# Patient Record
Sex: FEMALE | Race: WHITE | NOT HISPANIC OR LATINO | URBAN - METROPOLITAN AREA
[De-identification: names, ages, dates, MRNs, and addresses within clinical notes are randomized per-mention and may not be internally consistent; named-entity substitution may affect disease eponyms.]

---

## 2017-01-11 ENCOUNTER — OUTPATIENT (OUTPATIENT)
Dept: OUTPATIENT SERVICES | Facility: HOSPITAL | Age: 77
LOS: 1 days | Discharge: HOME | End: 2017-01-11

## 2017-04-21 ENCOUNTER — APPOINTMENT (OUTPATIENT)
Dept: CARDIOLOGY | Facility: CLINIC | Age: 77
End: 2017-04-21

## 2017-04-21 VITALS — WEIGHT: 142 LBS | HEIGHT: 67 IN | BODY MASS INDEX: 22.29 KG/M2

## 2017-04-21 VITALS — HEART RATE: 68 BPM | RESPIRATION RATE: 18 BRPM | DIASTOLIC BLOOD PRESSURE: 70 MMHG | SYSTOLIC BLOOD PRESSURE: 110 MMHG

## 2017-06-01 ENCOUNTER — OUTPATIENT (OUTPATIENT)
Dept: OUTPATIENT SERVICES | Facility: HOSPITAL | Age: 77
LOS: 1 days | Discharge: HOME | End: 2017-06-01

## 2017-06-01 DIAGNOSIS — R06.02 SHORTNESS OF BREATH: ICD-10-CM

## 2017-06-22 ENCOUNTER — OUTPATIENT (OUTPATIENT)
Dept: OUTPATIENT SERVICES | Facility: HOSPITAL | Age: 77
LOS: 1 days | Discharge: HOME | End: 2017-06-22

## 2017-06-22 DIAGNOSIS — R06.02 SHORTNESS OF BREATH: ICD-10-CM

## 2017-06-26 ENCOUNTER — OUTPATIENT (OUTPATIENT)
Dept: OUTPATIENT SERVICES | Facility: HOSPITAL | Age: 77
LOS: 1 days | Discharge: HOME | End: 2017-06-26

## 2017-06-26 DIAGNOSIS — R06.02 SHORTNESS OF BREATH: ICD-10-CM

## 2017-06-28 DIAGNOSIS — Z79.01 LONG TERM (CURRENT) USE OF ANTICOAGULANTS: ICD-10-CM

## 2017-06-28 DIAGNOSIS — I82.91 CHRONIC EMBOLISM AND THROMBOSIS OF UNSPECIFIED VEIN: ICD-10-CM

## 2017-07-13 ENCOUNTER — OUTPATIENT (OUTPATIENT)
Dept: OUTPATIENT SERVICES | Facility: HOSPITAL | Age: 77
LOS: 1 days | Discharge: HOME | End: 2017-07-13

## 2017-07-13 DIAGNOSIS — R06.02 SHORTNESS OF BREATH: ICD-10-CM

## 2017-07-13 DIAGNOSIS — Z95.2 PRESENCE OF PROSTHETIC HEART VALVE: ICD-10-CM

## 2017-07-13 DIAGNOSIS — Z79.01 LONG TERM (CURRENT) USE OF ANTICOAGULANTS: ICD-10-CM

## 2017-08-03 ENCOUNTER — OUTPATIENT (OUTPATIENT)
Dept: OUTPATIENT SERVICES | Facility: HOSPITAL | Age: 77
LOS: 1 days | Discharge: HOME | End: 2017-08-03

## 2017-08-03 DIAGNOSIS — Z95.2 PRESENCE OF PROSTHETIC HEART VALVE: ICD-10-CM

## 2017-08-03 DIAGNOSIS — Z79.01 LONG TERM (CURRENT) USE OF ANTICOAGULANTS: ICD-10-CM

## 2017-08-03 DIAGNOSIS — R06.02 SHORTNESS OF BREATH: ICD-10-CM

## 2017-08-24 ENCOUNTER — OUTPATIENT (OUTPATIENT)
Dept: OUTPATIENT SERVICES | Facility: HOSPITAL | Age: 77
LOS: 1 days | Discharge: HOME | End: 2017-08-24

## 2017-08-24 DIAGNOSIS — R06.02 SHORTNESS OF BREATH: ICD-10-CM

## 2017-08-24 DIAGNOSIS — Z95.2 PRESENCE OF PROSTHETIC HEART VALVE: ICD-10-CM

## 2017-08-24 DIAGNOSIS — Z79.01 LONG TERM (CURRENT) USE OF ANTICOAGULANTS: ICD-10-CM

## 2017-09-07 DIAGNOSIS — Z79.01 LONG TERM (CURRENT) USE OF ANTICOAGULANTS: ICD-10-CM

## 2017-09-07 DIAGNOSIS — I48.91 UNSPECIFIED ATRIAL FIBRILLATION: ICD-10-CM

## 2017-09-22 ENCOUNTER — OUTPATIENT (OUTPATIENT)
Dept: OUTPATIENT SERVICES | Facility: HOSPITAL | Age: 77
LOS: 1 days | Discharge: HOME | End: 2017-09-22

## 2017-09-22 DIAGNOSIS — Z79.01 LONG TERM (CURRENT) USE OF ANTICOAGULANTS: ICD-10-CM

## 2017-09-22 DIAGNOSIS — I48.91 UNSPECIFIED ATRIAL FIBRILLATION: ICD-10-CM

## 2017-09-22 DIAGNOSIS — R06.02 SHORTNESS OF BREATH: ICD-10-CM

## 2017-09-25 ENCOUNTER — OUTPATIENT (OUTPATIENT)
Dept: OUTPATIENT SERVICES | Facility: HOSPITAL | Age: 77
LOS: 1 days | Discharge: HOME | End: 2017-09-25

## 2017-09-25 DIAGNOSIS — R06.02 SHORTNESS OF BREATH: ICD-10-CM

## 2017-09-25 DIAGNOSIS — R07.89 OTHER CHEST PAIN: ICD-10-CM

## 2017-10-16 ENCOUNTER — OUTPATIENT (OUTPATIENT)
Dept: OUTPATIENT SERVICES | Facility: HOSPITAL | Age: 77
LOS: 1 days | Discharge: HOME | End: 2017-10-16

## 2017-10-16 DIAGNOSIS — R06.02 SHORTNESS OF BREATH: ICD-10-CM

## 2017-10-16 DIAGNOSIS — Z79.01 LONG TERM (CURRENT) USE OF ANTICOAGULANTS: ICD-10-CM

## 2017-10-16 DIAGNOSIS — I48.91 UNSPECIFIED ATRIAL FIBRILLATION: ICD-10-CM

## 2017-10-18 ENCOUNTER — RECORD ABSTRACTING (OUTPATIENT)
Age: 77
End: 2017-10-18

## 2017-10-18 DIAGNOSIS — R06.02 SHORTNESS OF BREATH: ICD-10-CM

## 2017-10-18 DIAGNOSIS — Z82.49 FAMILY HISTORY OF ISCHEMIC HEART DISEASE AND OTHER DISEASES OF THE CIRCULATORY SYSTEM: ICD-10-CM

## 2017-10-20 ENCOUNTER — APPOINTMENT (OUTPATIENT)
Dept: CARDIOLOGY | Facility: CLINIC | Age: 77
End: 2017-10-20

## 2017-10-20 VITALS — RESPIRATION RATE: 18 BRPM | SYSTOLIC BLOOD PRESSURE: 120 MMHG | HEART RATE: 68 BPM | DIASTOLIC BLOOD PRESSURE: 80 MMHG

## 2017-10-20 VITALS — BODY MASS INDEX: 21.82 KG/M2 | HEIGHT: 67 IN | WEIGHT: 139 LBS

## 2017-10-20 DIAGNOSIS — Z86.79 PERSONAL HISTORY OF OTHER DISEASES OF THE CIRCULATORY SYSTEM: ICD-10-CM

## 2017-11-06 ENCOUNTER — OUTPATIENT (OUTPATIENT)
Dept: OUTPATIENT SERVICES | Facility: HOSPITAL | Age: 77
LOS: 1 days | Discharge: HOME | End: 2017-11-06

## 2017-11-06 DIAGNOSIS — R06.02 SHORTNESS OF BREATH: ICD-10-CM

## 2017-11-06 DIAGNOSIS — I48.91 UNSPECIFIED ATRIAL FIBRILLATION: ICD-10-CM

## 2017-11-06 DIAGNOSIS — Z79.01 LONG TERM (CURRENT) USE OF ANTICOAGULANTS: ICD-10-CM

## 2017-12-04 ENCOUNTER — APPOINTMENT (OUTPATIENT)
Dept: CARDIOLOGY | Facility: CLINIC | Age: 77
End: 2017-12-04

## 2017-12-06 ENCOUNTER — OUTPATIENT (OUTPATIENT)
Dept: OUTPATIENT SERVICES | Facility: HOSPITAL | Age: 77
LOS: 1 days | Discharge: HOME | End: 2017-12-06

## 2017-12-06 DIAGNOSIS — R06.02 SHORTNESS OF BREATH: ICD-10-CM

## 2017-12-06 DIAGNOSIS — Z79.01 LONG TERM (CURRENT) USE OF ANTICOAGULANTS: ICD-10-CM

## 2017-12-06 DIAGNOSIS — I48.91 UNSPECIFIED ATRIAL FIBRILLATION: ICD-10-CM

## 2018-01-08 ENCOUNTER — OUTPATIENT (OUTPATIENT)
Dept: OUTPATIENT SERVICES | Facility: HOSPITAL | Age: 78
LOS: 1 days | Discharge: HOME | End: 2018-01-08

## 2018-01-08 DIAGNOSIS — R06.02 SHORTNESS OF BREATH: ICD-10-CM

## 2018-01-08 DIAGNOSIS — I48.91 UNSPECIFIED ATRIAL FIBRILLATION: ICD-10-CM

## 2018-01-08 DIAGNOSIS — Z79.01 LONG TERM (CURRENT) USE OF ANTICOAGULANTS: ICD-10-CM

## 2018-02-05 ENCOUNTER — OUTPATIENT (OUTPATIENT)
Dept: OUTPATIENT SERVICES | Facility: HOSPITAL | Age: 78
LOS: 1 days | Discharge: HOME | End: 2018-02-05

## 2018-02-05 DIAGNOSIS — Z79.01 LONG TERM (CURRENT) USE OF ANTICOAGULANTS: ICD-10-CM

## 2018-02-05 DIAGNOSIS — I48.91 UNSPECIFIED ATRIAL FIBRILLATION: ICD-10-CM

## 2018-03-05 ENCOUNTER — OUTPATIENT (OUTPATIENT)
Dept: OUTPATIENT SERVICES | Facility: HOSPITAL | Age: 78
LOS: 1 days | Discharge: HOME | End: 2018-03-05

## 2018-03-05 DIAGNOSIS — Z79.01 LONG TERM (CURRENT) USE OF ANTICOAGULANTS: ICD-10-CM

## 2018-03-05 DIAGNOSIS — I48.91 UNSPECIFIED ATRIAL FIBRILLATION: ICD-10-CM

## 2018-04-02 ENCOUNTER — OUTPATIENT (OUTPATIENT)
Dept: OUTPATIENT SERVICES | Facility: HOSPITAL | Age: 78
LOS: 1 days | Discharge: HOME | End: 2018-04-02

## 2018-04-02 DIAGNOSIS — Z79.01 LONG TERM (CURRENT) USE OF ANTICOAGULANTS: ICD-10-CM

## 2018-04-02 DIAGNOSIS — I48.91 UNSPECIFIED ATRIAL FIBRILLATION: ICD-10-CM

## 2018-04-23 ENCOUNTER — APPOINTMENT (OUTPATIENT)
Dept: CARDIOLOGY | Facility: CLINIC | Age: 78
End: 2018-04-23

## 2018-04-23 VITALS — HEART RATE: 98 BPM | DIASTOLIC BLOOD PRESSURE: 80 MMHG | RESPIRATION RATE: 18 BRPM | SYSTOLIC BLOOD PRESSURE: 136 MMHG

## 2018-04-23 VITALS — WEIGHT: 130 LBS | HEIGHT: 67 IN | BODY MASS INDEX: 20.4 KG/M2

## 2018-04-23 DIAGNOSIS — I48.2 CHRONIC ATRIAL FIBRILLATION: ICD-10-CM

## 2018-04-23 DIAGNOSIS — Z86.39 PERSONAL HISTORY OF OTHER ENDOCRINE, NUTRITIONAL AND METABOLIC DISEASE: ICD-10-CM

## 2018-04-30 ENCOUNTER — OUTPATIENT (OUTPATIENT)
Dept: OUTPATIENT SERVICES | Facility: HOSPITAL | Age: 78
LOS: 1 days | Discharge: HOME | End: 2018-04-30

## 2018-04-30 DIAGNOSIS — Z79.01 LONG TERM (CURRENT) USE OF ANTICOAGULANTS: ICD-10-CM

## 2018-04-30 DIAGNOSIS — I48.91 UNSPECIFIED ATRIAL FIBRILLATION: ICD-10-CM

## 2018-05-31 ENCOUNTER — OUTPATIENT (OUTPATIENT)
Dept: OUTPATIENT SERVICES | Facility: HOSPITAL | Age: 78
LOS: 1 days | Discharge: HOME | End: 2018-05-31

## 2018-05-31 DIAGNOSIS — I48.91 UNSPECIFIED ATRIAL FIBRILLATION: ICD-10-CM

## 2018-05-31 DIAGNOSIS — Z79.01 LONG TERM (CURRENT) USE OF ANTICOAGULANTS: ICD-10-CM

## 2018-06-13 ENCOUNTER — OUTPATIENT (OUTPATIENT)
Dept: OUTPATIENT SERVICES | Facility: HOSPITAL | Age: 78
LOS: 1 days | Discharge: HOME | End: 2018-06-13

## 2018-06-13 DIAGNOSIS — I48.91 UNSPECIFIED ATRIAL FIBRILLATION: ICD-10-CM

## 2018-06-13 DIAGNOSIS — Z79.01 LONG TERM (CURRENT) USE OF ANTICOAGULANTS: ICD-10-CM

## 2018-07-12 ENCOUNTER — OUTPATIENT (OUTPATIENT)
Dept: OUTPATIENT SERVICES | Facility: HOSPITAL | Age: 78
LOS: 1 days | Discharge: HOME | End: 2018-07-12

## 2018-07-12 DIAGNOSIS — Z79.01 LONG TERM (CURRENT) USE OF ANTICOAGULANTS: ICD-10-CM

## 2018-07-12 DIAGNOSIS — I48.91 UNSPECIFIED ATRIAL FIBRILLATION: ICD-10-CM

## 2018-08-09 ENCOUNTER — OUTPATIENT (OUTPATIENT)
Dept: OUTPATIENT SERVICES | Facility: HOSPITAL | Age: 78
LOS: 1 days | Discharge: HOME | End: 2018-08-09

## 2018-08-09 DIAGNOSIS — Z79.01 LONG TERM (CURRENT) USE OF ANTICOAGULANTS: ICD-10-CM

## 2018-08-09 DIAGNOSIS — I48.91 UNSPECIFIED ATRIAL FIBRILLATION: ICD-10-CM

## 2018-08-21 ENCOUNTER — OUTPATIENT (OUTPATIENT)
Dept: OUTPATIENT SERVICES | Facility: HOSPITAL | Age: 78
LOS: 1 days | Discharge: HOME | End: 2018-08-21

## 2018-08-21 LAB
POCT INR: 2.1 RATIO — HIGH (ref 0.9–1.2)
POCT PT: 25.7 SEC — HIGH (ref 10–13.4)

## 2018-09-06 ENCOUNTER — OUTPATIENT (OUTPATIENT)
Dept: OUTPATIENT SERVICES | Facility: HOSPITAL | Age: 78
LOS: 1 days | Discharge: HOME | End: 2018-09-06

## 2018-09-06 DIAGNOSIS — Z79.01 LONG TERM (CURRENT) USE OF ANTICOAGULANTS: ICD-10-CM

## 2018-09-06 DIAGNOSIS — I48.91 UNSPECIFIED ATRIAL FIBRILLATION: ICD-10-CM

## 2018-09-06 LAB
POCT INR: 2.1 RATIO — HIGH (ref 0.9–1.2)
POCT PT: 25.7 SEC — HIGH (ref 10–13.4)

## 2018-09-12 ENCOUNTER — APPOINTMENT (OUTPATIENT)
Dept: CARDIOLOGY | Facility: CLINIC | Age: 78
End: 2018-09-12

## 2018-09-27 ENCOUNTER — OUTPATIENT (OUTPATIENT)
Dept: OUTPATIENT SERVICES | Facility: HOSPITAL | Age: 78
LOS: 1 days | Discharge: HOME | End: 2018-09-27

## 2018-09-27 DIAGNOSIS — Z79.01 LONG TERM (CURRENT) USE OF ANTICOAGULANTS: ICD-10-CM

## 2018-09-27 DIAGNOSIS — I48.91 UNSPECIFIED ATRIAL FIBRILLATION: ICD-10-CM

## 2018-09-27 LAB
POCT INR: 2.9 RATIO — HIGH (ref 0.9–1.2)
POCT PT: 35.1 SEC — HIGH (ref 10–13.4)

## 2018-10-17 ENCOUNTER — OUTPATIENT (OUTPATIENT)
Dept: OUTPATIENT SERVICES | Facility: HOSPITAL | Age: 78
LOS: 1 days | Discharge: HOME | End: 2018-10-17

## 2018-10-17 DIAGNOSIS — R91.1 SOLITARY PULMONARY NODULE: ICD-10-CM

## 2018-10-29 ENCOUNTER — OUTPATIENT (OUTPATIENT)
Dept: OUTPATIENT SERVICES | Facility: HOSPITAL | Age: 78
LOS: 1 days | Discharge: HOME | End: 2018-10-29

## 2018-10-29 DIAGNOSIS — Z79.01 LONG TERM (CURRENT) USE OF ANTICOAGULANTS: ICD-10-CM

## 2018-10-29 DIAGNOSIS — I48.91 UNSPECIFIED ATRIAL FIBRILLATION: ICD-10-CM

## 2018-10-29 LAB
POCT INR: 2.6 RATIO — HIGH (ref 0.9–1.2)
POCT PT: 30.7 SEC — HIGH (ref 10–13.4)

## 2018-11-26 ENCOUNTER — OUTPATIENT (OUTPATIENT)
Dept: OUTPATIENT SERVICES | Facility: HOSPITAL | Age: 78
LOS: 1 days | Discharge: HOME | End: 2018-11-26

## 2018-11-26 DIAGNOSIS — I48.91 UNSPECIFIED ATRIAL FIBRILLATION: ICD-10-CM

## 2018-11-26 DIAGNOSIS — Z79.01 LONG TERM (CURRENT) USE OF ANTICOAGULANTS: ICD-10-CM

## 2018-11-26 LAB
POCT INR: 3.4 RATIO — HIGH (ref 0.9–1.2)
POCT PT: 40.2 SEC — HIGH (ref 10–13.4)

## 2018-12-03 ENCOUNTER — OUTPATIENT (OUTPATIENT)
Dept: OUTPATIENT SERVICES | Facility: HOSPITAL | Age: 78
LOS: 1 days | Discharge: HOME | End: 2018-12-03

## 2018-12-03 DIAGNOSIS — Z79.01 LONG TERM (CURRENT) USE OF ANTICOAGULANTS: ICD-10-CM

## 2018-12-03 DIAGNOSIS — I48.91 UNSPECIFIED ATRIAL FIBRILLATION: ICD-10-CM

## 2018-12-03 LAB
POCT INR: 2.3 RATIO — HIGH (ref 0.9–1.2)
POCT PT: 27.4 SEC — HIGH (ref 10–13.4)

## 2018-12-13 ENCOUNTER — OUTPATIENT (OUTPATIENT)
Dept: OUTPATIENT SERVICES | Facility: HOSPITAL | Age: 78
LOS: 1 days | Discharge: HOME | End: 2018-12-13

## 2018-12-13 DIAGNOSIS — Z79.01 LONG TERM (CURRENT) USE OF ANTICOAGULANTS: ICD-10-CM

## 2018-12-13 DIAGNOSIS — I48.91 UNSPECIFIED ATRIAL FIBRILLATION: ICD-10-CM

## 2018-12-13 LAB
POCT INR: 2.5 RATIO — HIGH (ref 0.9–1.2)
POCT PT: 30.5 SEC — HIGH (ref 10–13.4)

## 2019-01-01 ENCOUNTER — OUTPATIENT (OUTPATIENT)
Dept: OUTPATIENT SERVICES | Facility: HOSPITAL | Age: 79
LOS: 1 days | Discharge: HOME | End: 2019-01-01

## 2019-01-01 DIAGNOSIS — N28.1 CYST OF KIDNEY, ACQUIRED: Chronic | ICD-10-CM

## 2019-01-01 DIAGNOSIS — Z90.49 ACQUIRED ABSENCE OF OTHER SPECIFIED PARTS OF DIGESTIVE TRACT: Chronic | ICD-10-CM

## 2019-01-01 DIAGNOSIS — Z79.01 LONG TERM (CURRENT) USE OF ANTICOAGULANTS: ICD-10-CM

## 2019-01-01 DIAGNOSIS — I48.91 UNSPECIFIED ATRIAL FIBRILLATION: ICD-10-CM

## 2019-01-01 LAB
INR PPP: 2.1 RATIO
POCT INR: 2.1 RATIO — HIGH (ref 0.9–1.2)
POCT INR: 2.1 RATIO — HIGH (ref 0.9–1.2)
POCT PT: 25 SEC — HIGH (ref 10–13.4)
POCT PT: 25 SEC — HIGH (ref 10–13.4)
POCT-PROTHROMBIN TIME: 25 SECS

## 2019-01-03 ENCOUNTER — OUTPATIENT (OUTPATIENT)
Dept: OUTPATIENT SERVICES | Facility: HOSPITAL | Age: 79
LOS: 1 days | Discharge: HOME | End: 2019-01-03

## 2019-01-03 DIAGNOSIS — Z79.01 LONG TERM (CURRENT) USE OF ANTICOAGULANTS: ICD-10-CM

## 2019-01-03 DIAGNOSIS — I48.91 UNSPECIFIED ATRIAL FIBRILLATION: ICD-10-CM

## 2019-01-03 LAB
POCT INR: 2.9 RATIO — HIGH (ref 0.9–1.2)
POCT PT: 35.2 SEC — HIGH (ref 10–13.4)

## 2019-02-05 ENCOUNTER — OUTPATIENT (OUTPATIENT)
Dept: OUTPATIENT SERVICES | Facility: HOSPITAL | Age: 79
LOS: 1 days | Discharge: HOME | End: 2019-02-05

## 2019-02-05 DIAGNOSIS — Z79.01 LONG TERM (CURRENT) USE OF ANTICOAGULANTS: ICD-10-CM

## 2019-02-05 DIAGNOSIS — I48.91 UNSPECIFIED ATRIAL FIBRILLATION: ICD-10-CM

## 2019-02-05 LAB
POCT INR: 2 RATIO — HIGH (ref 0.9–1.2)
POCT PT: 24.3 SEC — HIGH (ref 10–13.4)

## 2019-03-01 ENCOUNTER — APPOINTMENT (OUTPATIENT)
Dept: CARDIOLOGY | Facility: CLINIC | Age: 79
End: 2019-03-01

## 2019-03-01 VITALS — HEART RATE: 68 BPM | RESPIRATION RATE: 18 BRPM | SYSTOLIC BLOOD PRESSURE: 120 MMHG | DIASTOLIC BLOOD PRESSURE: 70 MMHG

## 2019-03-01 VITALS — WEIGHT: 121 LBS | BODY MASS INDEX: 18.99 KG/M2 | HEIGHT: 67 IN

## 2019-03-01 DIAGNOSIS — Z87.09 PERSONAL HISTORY OF OTHER DISEASES OF THE RESPIRATORY SYSTEM: ICD-10-CM

## 2019-03-01 NOTE — PHYSICAL EXAM
[General Appearance - Well Developed] : well developed [Normal Appearance] : normal appearance [Well Groomed] : well groomed [General Appearance - Well Nourished] : well nourished [No Deformities] : no deformities [General Appearance - In No Acute Distress] : no acute distress [Normal Conjunctiva] : the conjunctiva exhibited no abnormalities [Eyelids - No Xanthelasma] : the eyelids demonstrated no xanthelasmas [Normal Oral Mucosa] : normal oral mucosa [No Oral Pallor] : no oral pallor [No Oral Cyanosis] : no oral cyanosis [Normal Jugular Venous A Waves Present] : normal jugular venous A waves present [Normal Jugular Venous V Waves Present] : normal jugular venous V waves present [No Jugular Venous Monreal A Waves] : no jugular venous monreal A waves [Respiration, Rhythm And Depth] : normal respiratory rhythm and effort [Exaggerated Use Of Accessory Muscles For Inspiration] : no accessory muscle use [Auscultation Breath Sounds / Voice Sounds] : lungs were clear to auscultation bilaterally [Heart Rate And Rhythm] : heart rate and rhythm were normal [Irregularly Irregular] : the rhythm was irregularly irregular [Systolic grade ___/6] : A grade [unfilled]/6 systolic murmur was heard. [Bowel Sounds] : normal bowel sounds [Abdomen Soft] : soft [Abdomen Tenderness] : non-tender [Abdomen Mass (___ Cm)] : no abdominal mass palpated [Abnormal Walk] : normal gait [Nail Clubbing] : no clubbing of the fingernails [Cyanosis, Localized] : no localized cyanosis [Petechial Hemorrhages (___cm)] : no petechial hemorrhages [Skin Color & Pigmentation] : normal skin color and pigmentation [] : no rash [No Venous Stasis] : no venous stasis [Skin Lesions] : no skin lesions [No Skin Ulcers] : no skin ulcer [No Xanthoma] : no  xanthoma was observed [Oriented To Time, Place, And Person] : oriented to person, place, and time

## 2019-03-04 ENCOUNTER — OUTPATIENT (OUTPATIENT)
Dept: OUTPATIENT SERVICES | Facility: HOSPITAL | Age: 79
LOS: 1 days | Discharge: HOME | End: 2019-03-04

## 2019-03-04 DIAGNOSIS — I48.91 UNSPECIFIED ATRIAL FIBRILLATION: ICD-10-CM

## 2019-03-04 DIAGNOSIS — Z79.01 LONG TERM (CURRENT) USE OF ANTICOAGULANTS: ICD-10-CM

## 2019-03-04 LAB
POCT INR: 2.6 RATIO — HIGH (ref 0.9–1.2)
POCT PT: 31 SEC — HIGH (ref 10–13.4)

## 2019-04-02 ENCOUNTER — OUTPATIENT (OUTPATIENT)
Dept: OUTPATIENT SERVICES | Facility: HOSPITAL | Age: 79
LOS: 1 days | Discharge: HOME | End: 2019-04-02

## 2019-04-02 DIAGNOSIS — I48.91 UNSPECIFIED ATRIAL FIBRILLATION: ICD-10-CM

## 2019-04-02 DIAGNOSIS — Z79.01 LONG TERM (CURRENT) USE OF ANTICOAGULANTS: ICD-10-CM

## 2019-04-02 LAB
POCT INR: 2 RATIO — HIGH (ref 0.9–1.2)
POCT PT: 23.9 SEC — HIGH (ref 10–13.4)

## 2019-04-30 ENCOUNTER — OUTPATIENT (OUTPATIENT)
Dept: OUTPATIENT SERVICES | Facility: HOSPITAL | Age: 79
LOS: 1 days | Discharge: HOME | End: 2019-04-30

## 2019-04-30 DIAGNOSIS — I48.91 UNSPECIFIED ATRIAL FIBRILLATION: ICD-10-CM

## 2019-04-30 DIAGNOSIS — Z79.01 LONG TERM (CURRENT) USE OF ANTICOAGULANTS: ICD-10-CM

## 2019-04-30 LAB
POCT INR: 1.6 RATIO — HIGH (ref 0.9–1.2)
POCT PT: 19.2 SEC — HIGH (ref 10–13.4)

## 2019-05-10 ENCOUNTER — OUTPATIENT (OUTPATIENT)
Dept: OUTPATIENT SERVICES | Facility: HOSPITAL | Age: 79
LOS: 1 days | Discharge: HOME | End: 2019-05-10

## 2019-05-10 DIAGNOSIS — I48.91 UNSPECIFIED ATRIAL FIBRILLATION: ICD-10-CM

## 2019-05-10 DIAGNOSIS — Z79.01 LONG TERM (CURRENT) USE OF ANTICOAGULANTS: ICD-10-CM

## 2019-05-10 LAB
POCT INR: 2 RATIO — HIGH (ref 0.9–1.2)
POCT PT: 24.3 SEC — HIGH (ref 10–13.4)

## 2019-05-30 ENCOUNTER — OUTPATIENT (OUTPATIENT)
Dept: OUTPATIENT SERVICES | Facility: HOSPITAL | Age: 79
LOS: 1 days | Discharge: HOME | End: 2019-05-30

## 2019-05-30 DIAGNOSIS — Z79.01 LONG TERM (CURRENT) USE OF ANTICOAGULANTS: ICD-10-CM

## 2019-05-30 DIAGNOSIS — I48.91 UNSPECIFIED ATRIAL FIBRILLATION: ICD-10-CM

## 2019-05-30 LAB
POCT INR: 1.9 RATIO — HIGH (ref 0.9–1.2)
POCT PT: 22.4 SEC — HIGH (ref 10–13.4)

## 2019-06-17 ENCOUNTER — OUTPATIENT (OUTPATIENT)
Dept: OUTPATIENT SERVICES | Facility: HOSPITAL | Age: 79
LOS: 1 days | Discharge: HOME | End: 2019-06-17

## 2019-06-17 DIAGNOSIS — Z79.01 LONG TERM (CURRENT) USE OF ANTICOAGULANTS: ICD-10-CM

## 2019-06-17 DIAGNOSIS — I48.91 UNSPECIFIED ATRIAL FIBRILLATION: ICD-10-CM

## 2019-06-17 LAB
POCT INR: 1.9 RATIO — HIGH (ref 0.9–1.2)
POCT PT: 22.5 SEC — HIGH (ref 10–13.4)

## 2019-07-01 ENCOUNTER — OUTPATIENT (OUTPATIENT)
Dept: OUTPATIENT SERVICES | Facility: HOSPITAL | Age: 79
LOS: 1 days | Discharge: HOME | End: 2019-07-01

## 2019-07-01 DIAGNOSIS — Z79.01 LONG TERM (CURRENT) USE OF ANTICOAGULANTS: ICD-10-CM

## 2019-07-01 DIAGNOSIS — I48.91 UNSPECIFIED ATRIAL FIBRILLATION: ICD-10-CM

## 2019-07-01 LAB
POCT INR: 2.3 RATIO — HIGH (ref 0.9–1.2)
POCT PT: 27.6 SEC — HIGH (ref 10–13.4)

## 2019-08-05 ENCOUNTER — OUTPATIENT (OUTPATIENT)
Dept: OUTPATIENT SERVICES | Facility: HOSPITAL | Age: 79
LOS: 1 days | Discharge: HOME | End: 2019-08-05

## 2019-08-05 DIAGNOSIS — I48.91 UNSPECIFIED ATRIAL FIBRILLATION: ICD-10-CM

## 2019-08-05 DIAGNOSIS — Z79.01 LONG TERM (CURRENT) USE OF ANTICOAGULANTS: ICD-10-CM

## 2019-08-05 LAB
POCT INR: 2.3 RATIO — HIGH (ref 0.9–1.2)
POCT PT: 27.4 SEC — HIGH (ref 10–13.4)

## 2019-09-06 ENCOUNTER — INPATIENT (INPATIENT)
Facility: HOSPITAL | Age: 79
LOS: 6 days | Discharge: HOME HEALTH | End: 2019-09-13
Attending: INTERNAL MEDICINE | Admitting: INTERNAL MEDICINE
Payer: MEDICARE

## 2019-09-06 VITALS
HEART RATE: 120 BPM | OXYGEN SATURATION: 89 % | SYSTOLIC BLOOD PRESSURE: 203 MMHG | TEMPERATURE: 97 F | DIASTOLIC BLOOD PRESSURE: 87 MMHG | RESPIRATION RATE: 20 BRPM

## 2019-09-06 DIAGNOSIS — J44.0 CHRONIC OBSTRUCTIVE PULMONARY DISEASE WITH (ACUTE) LOWER RESPIRATORY INFECTION: ICD-10-CM

## 2019-09-06 DIAGNOSIS — E78.5 HYPERLIPIDEMIA, UNSPECIFIED: ICD-10-CM

## 2019-09-06 DIAGNOSIS — I48.2 CHRONIC ATRIAL FIBRILLATION: ICD-10-CM

## 2019-09-06 DIAGNOSIS — Z79.01 LONG TERM (CURRENT) USE OF ANTICOAGULANTS: ICD-10-CM

## 2019-09-06 DIAGNOSIS — J44.1 CHRONIC OBSTRUCTIVE PULMONARY DISEASE WITH (ACUTE) EXACERBATION: ICD-10-CM

## 2019-09-06 DIAGNOSIS — G47.00 INSOMNIA, UNSPECIFIED: ICD-10-CM

## 2019-09-06 DIAGNOSIS — I50.30 UNSPECIFIED DIASTOLIC (CONGESTIVE) HEART FAILURE: ICD-10-CM

## 2019-09-06 DIAGNOSIS — E87.5 HYPERKALEMIA: ICD-10-CM

## 2019-09-06 DIAGNOSIS — E87.2 ACIDOSIS: ICD-10-CM

## 2019-09-06 DIAGNOSIS — E87.70 FLUID OVERLOAD, UNSPECIFIED: ICD-10-CM

## 2019-09-06 DIAGNOSIS — R09.02 HYPOXEMIA: ICD-10-CM

## 2019-09-06 DIAGNOSIS — J96.10 CHRONIC RESPIRATORY FAILURE, UNSPECIFIED WHETHER WITH HYPOXIA OR HYPERCAPNIA: ICD-10-CM

## 2019-09-06 DIAGNOSIS — I34.0 NONRHEUMATIC MITRAL (VALVE) INSUFFICIENCY: ICD-10-CM

## 2019-09-06 DIAGNOSIS — I10 ESSENTIAL (PRIMARY) HYPERTENSION: ICD-10-CM

## 2019-09-06 DIAGNOSIS — I11.0 HYPERTENSIVE HEART DISEASE WITH HEART FAILURE: ICD-10-CM

## 2019-09-06 DIAGNOSIS — J15.6 PNEUMONIA DUE TO OTHER GRAM-NEGATIVE BACTERIA: ICD-10-CM

## 2019-09-06 LAB
ALBUMIN SERPL ELPH-MCNC: 3.8 G/DL — SIGNIFICANT CHANGE UP (ref 3.5–5.2)
ALP SERPL-CCNC: 96 U/L — SIGNIFICANT CHANGE UP (ref 30–115)
ALT FLD-CCNC: 27 U/L — SIGNIFICANT CHANGE UP (ref 0–41)
ANION GAP SERPL CALC-SCNC: 10 MMOL/L — SIGNIFICANT CHANGE UP (ref 7–14)
APTT BLD: 37.9 SEC — SIGNIFICANT CHANGE UP (ref 27–39.2)
AST SERPL-CCNC: 22 U/L — SIGNIFICANT CHANGE UP (ref 0–41)
BASE EXCESS BLDV CALC-SCNC: 7.4 MMOL/L — HIGH (ref -2–2)
BASOPHILS # BLD AUTO: 0.03 K/UL — SIGNIFICANT CHANGE UP (ref 0–0.2)
BASOPHILS NFR BLD AUTO: 0.5 % — SIGNIFICANT CHANGE UP (ref 0–1)
BILIRUB SERPL-MCNC: 0.5 MG/DL — SIGNIFICANT CHANGE UP (ref 0.2–1.2)
BUN SERPL-MCNC: 12 MG/DL — SIGNIFICANT CHANGE UP (ref 10–20)
CA-I SERPL-SCNC: 1.13 MMOL/L — SIGNIFICANT CHANGE UP (ref 1.12–1.3)
CALCIUM SERPL-MCNC: 9 MG/DL — SIGNIFICANT CHANGE UP (ref 8.5–10.1)
CHLORIDE SERPL-SCNC: 93 MMOL/L — LOW (ref 98–110)
CO2 SERPL-SCNC: 31 MMOL/L — SIGNIFICANT CHANGE UP (ref 17–32)
CREAT SERPL-MCNC: 0.6 MG/DL — LOW (ref 0.7–1.5)
DIGOXIN SERPL-MCNC: 1 NG/ML — SIGNIFICANT CHANGE UP (ref 0.8–2)
EOSINOPHIL # BLD AUTO: 0.03 K/UL — SIGNIFICANT CHANGE UP (ref 0–0.7)
EOSINOPHIL NFR BLD AUTO: 0.5 % — SIGNIFICANT CHANGE UP (ref 0–8)
GAS PNL BLDV: 135 MMOL/L — LOW (ref 136–145)
GAS PNL BLDV: SIGNIFICANT CHANGE UP
GLUCOSE SERPL-MCNC: 92 MG/DL — SIGNIFICANT CHANGE UP (ref 70–99)
HCO3 BLDV-SCNC: 35 MMOL/L — HIGH (ref 22–29)
HCT VFR BLD CALC: 37.6 % — SIGNIFICANT CHANGE UP (ref 37–47)
HCT VFR BLDA CALC: 39.2 % — SIGNIFICANT CHANGE UP (ref 34–44)
HGB BLD CALC-MCNC: 12.8 G/DL — LOW (ref 14–18)
HGB BLD-MCNC: 12 G/DL — SIGNIFICANT CHANGE UP (ref 12–16)
IMM GRANULOCYTES NFR BLD AUTO: 0.5 % — HIGH (ref 0.1–0.3)
INR BLD: 2.21 RATIO — HIGH (ref 0.65–1.3)
LACTATE BLDV-MCNC: 0.9 MMOL/L — SIGNIFICANT CHANGE UP (ref 0.5–1.6)
LACTATE SERPL-SCNC: 0.8 MMOL/L — SIGNIFICANT CHANGE UP (ref 0.5–2.2)
LYMPHOCYTES # BLD AUTO: 0.84 K/UL — LOW (ref 1.2–3.4)
LYMPHOCYTES # BLD AUTO: 12.7 % — LOW (ref 20.5–51.1)
MCHC RBC-ENTMCNC: 27.1 PG — SIGNIFICANT CHANGE UP (ref 27–31)
MCHC RBC-ENTMCNC: 31.9 G/DL — LOW (ref 32–37)
MCV RBC AUTO: 84.9 FL — SIGNIFICANT CHANGE UP (ref 81–99)
MONOCYTES # BLD AUTO: 0.51 K/UL — SIGNIFICANT CHANGE UP (ref 0.1–0.6)
MONOCYTES NFR BLD AUTO: 7.7 % — SIGNIFICANT CHANGE UP (ref 1.7–9.3)
NEUTROPHILS # BLD AUTO: 5.17 K/UL — SIGNIFICANT CHANGE UP (ref 1.4–6.5)
NEUTROPHILS NFR BLD AUTO: 78.1 % — HIGH (ref 42.2–75.2)
NRBC # BLD: 0 /100 WBCS — SIGNIFICANT CHANGE UP (ref 0–0)
NT-PROBNP SERPL-SCNC: 3331 PG/ML — HIGH (ref 0–300)
PCO2 BLDV: 62 MMHG — HIGH (ref 41–51)
PH BLDV: 7.36 — SIGNIFICANT CHANGE UP (ref 7.26–7.43)
PLATELET # BLD AUTO: 281 K/UL — SIGNIFICANT CHANGE UP (ref 130–400)
PO2 BLDV: 29 MMHG — SIGNIFICANT CHANGE UP (ref 20–40)
POTASSIUM BLDV-SCNC: 4.3 MMOL/L — SIGNIFICANT CHANGE UP (ref 3.3–5.6)
POTASSIUM SERPL-MCNC: 4.7 MMOL/L — SIGNIFICANT CHANGE UP (ref 3.5–5)
POTASSIUM SERPL-SCNC: 4.7 MMOL/L — SIGNIFICANT CHANGE UP (ref 3.5–5)
PROT SERPL-MCNC: 6.5 G/DL — SIGNIFICANT CHANGE UP (ref 6–8)
PROTHROM AB SERPL-ACNC: 25.2 SEC — HIGH (ref 9.95–12.87)
RBC # BLD: 4.43 M/UL — SIGNIFICANT CHANGE UP (ref 4.2–5.4)
RBC # FLD: 14.4 % — SIGNIFICANT CHANGE UP (ref 11.5–14.5)
SAO2 % BLDV: 53 % — SIGNIFICANT CHANGE UP
SODIUM SERPL-SCNC: 134 MMOL/L — LOW (ref 135–146)
TROPONIN T SERPL-MCNC: <0.01 NG/ML — SIGNIFICANT CHANGE UP
WBC # BLD: 6.61 K/UL — SIGNIFICANT CHANGE UP (ref 4.8–10.8)
WBC # FLD AUTO: 6.61 K/UL — SIGNIFICANT CHANGE UP (ref 4.8–10.8)

## 2019-09-06 PROCEDURE — 99285 EMERGENCY DEPT VISIT HI MDM: CPT | Mod: GC

## 2019-09-06 PROCEDURE — 93010 ELECTROCARDIOGRAM REPORT: CPT

## 2019-09-06 PROCEDURE — 71045 X-RAY EXAM CHEST 1 VIEW: CPT | Mod: 26,59

## 2019-09-06 PROCEDURE — 71046 X-RAY EXAM CHEST 2 VIEWS: CPT | Mod: 26

## 2019-09-06 RX ORDER — DIGOXIN 250 MCG
0.25 TABLET ORAL DAILY
Refills: 0 | Status: DISCONTINUED | OUTPATIENT
Start: 2019-09-06 | End: 2019-09-13

## 2019-09-06 RX ORDER — ZALEPLON 10 MG
5 CAPSULE ORAL AT BEDTIME
Refills: 0 | Status: DISCONTINUED | OUTPATIENT
Start: 2019-09-06 | End: 2019-09-07

## 2019-09-06 RX ORDER — ALBUTEROL 90 UG/1
2.5 AEROSOL, METERED ORAL ONCE
Refills: 0 | Status: COMPLETED | OUTPATIENT
Start: 2019-09-06 | End: 2019-09-06

## 2019-09-06 RX ORDER — CEFTRIAXONE 500 MG/1
1000 INJECTION, POWDER, FOR SOLUTION INTRAMUSCULAR; INTRAVENOUS ONCE
Refills: 0 | Status: COMPLETED | OUTPATIENT
Start: 2019-09-06 | End: 2019-09-06

## 2019-09-06 RX ORDER — DILTIAZEM HCL 120 MG
360 CAPSULE, EXT RELEASE 24 HR ORAL DAILY
Refills: 0 | Status: DISCONTINUED | OUTPATIENT
Start: 2019-09-06 | End: 2019-09-13

## 2019-09-06 RX ORDER — AZITHROMYCIN 500 MG/1
500 TABLET, FILM COATED ORAL EVERY 24 HOURS
Refills: 0 | Status: DISCONTINUED | OUTPATIENT
Start: 2019-09-06 | End: 2019-09-11

## 2019-09-06 RX ORDER — IPRATROPIUM/ALBUTEROL SULFATE 18-103MCG
3 AEROSOL WITH ADAPTER (GRAM) INHALATION EVERY 6 HOURS
Refills: 0 | Status: DISCONTINUED | OUTPATIENT
Start: 2019-09-06 | End: 2019-09-09

## 2019-09-06 RX ORDER — WARFARIN SODIUM 2.5 MG/1
5 TABLET ORAL AT BEDTIME
Refills: 0 | Status: COMPLETED | OUTPATIENT
Start: 2019-09-06 | End: 2019-09-07

## 2019-09-06 RX ORDER — IPRATROPIUM/ALBUTEROL SULFATE 18-103MCG
3 AEROSOL WITH ADAPTER (GRAM) INHALATION ONCE
Refills: 0 | Status: COMPLETED | OUTPATIENT
Start: 2019-09-06 | End: 2019-09-06

## 2019-09-06 RX ORDER — AZITHROMYCIN 500 MG/1
500 TABLET, FILM COATED ORAL EVERY 24 HOURS
Refills: 0 | Status: DISCONTINUED | OUTPATIENT
Start: 2019-09-06 | End: 2019-09-06

## 2019-09-06 RX ORDER — CEFTRIAXONE 500 MG/1
1000 INJECTION, POWDER, FOR SOLUTION INTRAMUSCULAR; INTRAVENOUS EVERY 24 HOURS
Refills: 0 | Status: COMPLETED | OUTPATIENT
Start: 2019-09-06 | End: 2019-09-10

## 2019-09-06 RX ORDER — ASPIRIN/CALCIUM CARB/MAGNESIUM 324 MG
81 TABLET ORAL DAILY
Refills: 0 | Status: DISCONTINUED | OUTPATIENT
Start: 2019-09-06 | End: 2019-09-13

## 2019-09-06 RX ORDER — DILTIAZEM HCL 120 MG
300 CAPSULE, EXT RELEASE 24 HR ORAL DAILY
Refills: 0 | Status: DISCONTINUED | OUTPATIENT
Start: 2019-09-06 | End: 2019-09-06

## 2019-09-06 RX ORDER — ATORVASTATIN CALCIUM 80 MG/1
10 TABLET, FILM COATED ORAL AT BEDTIME
Refills: 0 | Status: DISCONTINUED | OUTPATIENT
Start: 2019-09-06 | End: 2019-09-13

## 2019-09-06 RX ORDER — CHLORHEXIDINE GLUCONATE 213 G/1000ML
1 SOLUTION TOPICAL
Refills: 0 | Status: DISCONTINUED | OUTPATIENT
Start: 2019-09-06 | End: 2019-09-13

## 2019-09-06 RX ADMIN — CEFTRIAXONE 1000 MILLIGRAM(S): 500 INJECTION, POWDER, FOR SOLUTION INTRAMUSCULAR; INTRAVENOUS at 18:17

## 2019-09-06 RX ADMIN — Medication 3 MILLILITER(S): at 19:20

## 2019-09-06 RX ADMIN — ALBUTEROL 2.5 MILLIGRAM(S): 90 AEROSOL, METERED ORAL at 20:02

## 2019-09-06 RX ADMIN — Medication 3 MILLILITER(S): at 19:00

## 2019-09-06 RX ADMIN — ALBUTEROL 2.5 MILLIGRAM(S): 90 AEROSOL, METERED ORAL at 19:40

## 2019-09-06 RX ADMIN — Medication 125 MILLIGRAM(S): at 17:38

## 2019-09-06 RX ADMIN — AZITHROMYCIN 255 MILLIGRAM(S): 500 TABLET, FILM COATED ORAL at 18:17

## 2019-09-06 RX ADMIN — Medication 300 MILLIGRAM(S): at 17:37

## 2019-09-06 RX ADMIN — Medication 3 MILLILITER(S): at 17:38

## 2019-09-06 RX ADMIN — ALBUTEROL 2.5 MILLIGRAM(S): 90 AEROSOL, METERED ORAL at 19:51

## 2019-09-06 RX ADMIN — CEFTRIAXONE 100 MILLIGRAM(S): 500 INJECTION, POWDER, FOR SOLUTION INTRAMUSCULAR; INTRAVENOUS at 17:42

## 2019-09-06 NOTE — H&P ADULT - NSHPPHYSICALEXAM_GEN_ALL_CORE
PHYSICAL EXAM:  GENERAL: NAD, lying in bed comfortably  HEAD:  Atraumatic, Normocephalic  EYES: EOMI, PERRLA, conjunctiva and sclera clear  ENT: Moist mucous membranes  NECK: Supple, No JVD  CHEST/LUNG: Clear to auscultation bilaterally; No rales, rhonchi, wheezing, or rubs. Unlabored respirations  HEART: Regular rate and rhythm; No murmurs, rubs, or gallops  ABDOMEN: Bowel sounds present; Soft, Nontender, Nondistended. No hepatomegally  EXTREMITIES:  2+ Peripheral Pulses, brisk capillary refill. No clubbing, cyanosis, or edema  NERVOUS SYSTEM:  Alert & Oriented X3, speech clear. No deficits   MSK: FROM all 4 extremities, full and equal strength  SKIN: No rashes or lesions PHYSICAL EXAM:  GENERAL: NAD, lying in bed comfortably  HEAD:  Atraumatic, Normocephalic  EYES: EOMI, PERRLA, conjunctiva and sclera clear  ENT: Moist mucous membranes  CHEST/LUNG: mild wheeze bilateral   HEART: normal s1, s2  ABDOMEN: Soft, Nontender, Nondistended. No hepatomegally  EXTREMITIES:   brisk capillary refill. No clubbing, cyanosis, or edema  NERVOUS SYSTEM:  Alert & Oriented X3, speech clear. No deficits   MSK: FROM all 4 extremities, full and equal strength  SKIN: No rashes or lesions

## 2019-09-06 NOTE — H&P ADULT - ASSESSMENT
79 year old female with a history of afib on coumadin, htn, hld, copd (not on home 02) presents here for worsening sob for the last several days. 79 year old female with a history of afib on coumadin, htn, hld, copd (not on home 02) presents here for worsening sob for the last several days.    # COPD exacerbation due to RLL CAP?  - no fever or leukocytosis, hemodynamically stable   - PA/ Lateral CXR   - IV solumedrol 60 BID   - nebs q6   - RVP   - ceftriaxone and azithromycin     # afib rate controlled   - c/w cardizim and coumadin     # HTN  c/w enalapril    # DASH diet   # FULL code   Dispo as per PT 79 year old female with a history of afib on coumadin, htn, hld, copd (not on home 02) presents here for worsening sob for the last several days.    # COPD exacerbation due to RLL CAP?  - no fever or leukocytosis, hemodynamically stable   - PA/ Lateral CXR   - IV solumedrol 60 BID   - nebs q6   - RVP   - ceftriaxone and azithromycin     # afib rate controlled   - c/w Cardizem 360?, digoxin and coumadin 5 QD    # HTN  c/w enalapril    # DVT ppx on AC  # DASH diet   # FULL code   Dispo as per PT

## 2019-09-06 NOTE — ED PROVIDER NOTE - NS ED ROS FT
Constitutional: See HPI.  Eyes: No visual changes  ENMT: No neck pain   Cardiac: No cp  Respiratory: see hpi  GI: No nausea, vomiting, diarrhea or abdominal pain.  : No dysuria, frequency or burning.  MS: No myalgia, muscle weakness, joint pain or back pain.  Psych: No suicidal or homicidal ideations.  Neuro: No headache   Skin: No skin rash.

## 2019-09-06 NOTE — ED PROVIDER NOTE - ATTENDING CONTRIBUTION TO CARE
79 y F with a pmh of afib on digoxin, coumadin, and cardizem, htn, hld, COPD no home O2, pw productive cough of yellow phlegm, and worsening SOB x 3-4 days. Denies fever, chills, nausea, vomiting, diarrhea, dysuria, hematuria, numbness, tingling, weakness, pain to anywhere, leg swelling, malignancy history, recent travel or surgery. Previously in normal state of health. Skipped her cardizem today due to feeling unwell.  Exam: NAD, NCAT, HEENT: mmm, EOMI, PERRLA, Neck: supple, nontender, nl ROM, Heart: irr irr tachy 90s-130s, no murmur, Lungs: b/l diffuse wheezing and right midrange to base rhonchi. tachypnea, prolonged exp phase, pursed lip breathing and foreshortened sentences, Abd: NTND, no guarding or rebound, no hernia palpated, no CVAT. MSK: chest, back, and ext nontender, nl rom, no deformity. Neuro: A&Ox3, normal strength, nl sensation throughout, normal speech.  A/P: Eval for PNA or PTX or other complication causing primarily COPD exacerbation. Assess for volume status, generally appears euvolemic to slightly dehydrated. Rate control with home medication when breathing is improved, assess dig level. Labs, imaging, fluids, chained nebs, feels claustrophobia and declines BiPAP. Reassess.

## 2019-09-06 NOTE — H&P ADULT - HISTORY OF PRESENT ILLNESS
79 year old female with a history of afib on coumadin, htn, hld, copd (not on home 02) presents here for worsening sob for the last several days.  patient was doing well until 4 Patient endorses a cough productive a clear sputum. States feels similar to her previous copd exacerbation. Patient denies any fever chills cp n/v abdominal pain urinary frequency urgency or burning. 79 year old female with a history of afib on coumadin, HTN, HLD, COPD (not on home 02) presents here for worsening sob for 5 days duration.    patient was doing well until 4 days prior to presentation when she started productive cough and worsening SOB, with no fever or chills, no sick contact,   Patient denies cp n/v abdominal pain, urinary or symptoms   in ed:  T(C): 36.8 (06 Sep 2019 20:00), Max: 36.8 (06 Sep 2019 20:00)  T(F): 98.2 (06 Sep 2019 20:00), Max: 98.2 (06 Sep 2019 20:00)  HR: 110 (06 Sep 2019 20:00) (101 - 120)  BP: 128/81 (06 Sep 2019 20:00) (128/81 - 203/87)  RR: 20 (06 Sep 2019 20:00) (20 - 22)  SpO2: 95% (06 Sep 2019 20:00) (89% - 95%)  CXR showed RLL opacity, with no leukocytosis, received Rocephin and azithromycin

## 2019-09-06 NOTE — ED PROVIDER NOTE - OBJECTIVE STATEMENT
79 year old female with a pmh of afib (takes cardizem) on coumadin, htn hld copd (not on home 02) presents here for worsening sob for the last several days. Patient endorses a cough productive a clear sputum. States feels similar to her previous copd exacerbation. Patient denies any fever chills cp n/v abdominal pain urinary frequency urgency or burning.    cardiologist: jose   pulmonologist: kirstie

## 2019-09-06 NOTE — H&P ADULT - NSHPLABSRESULTS_GEN_ALL_CORE
12.0   6.61  )-----------( 281      ( 06 Sep 2019 18:00 )             37.6       09-06    134<L>  |  93<L>  |  12  ----------------------------<  92  4.7   |  31  |  0.6<L>    Ca    9.0      06 Sep 2019 18:00    TPro  6.5  /  Alb  3.8  /  TBili  0.5  /  DBili  x   /  AST  22  /  ALT  27  /  AlkPhos  96  09-06                  PT/INR - ( 06 Sep 2019 18:00 )   PT: 25.20 sec;   INR: 2.21 ratio         PTT - ( 06 Sep 2019 18:00 )  PTT:37.9 sec    Lactate Trend  09-06 @ 18:00 Lactate:0.8       CARDIAC MARKERS ( 06 Sep 2019 18:00 )  x     / <0.01 ng/mL / x     / x     / x            CAPILLARY BLOOD GLUCOSE

## 2019-09-06 NOTE — H&P ADULT - ATTENDING COMMENTS
79 year old female with a history of afib on coumadin, HTN, HLD, COPD (not on home 02) presents here for worsening sob for 5 days duration.    patient was doing well until 4 days prior to presentation when she started productive cough and worsening SOB, with no fever or chills, no sick contact,   Patient denies cp n/v abdominal pain, urinary or symptoms   in ed:  T(C): 36.8 (06 Sep 2019 20:00), Max: 36.8 (06 Sep 2019 20:00)  T(F): 98.2 (06 Sep 2019 20:00), Max: 98.2 (06 Sep 2019 20:00)  HR: 110 (06 Sep 2019 20:00) (101 - 120)  BP: 128/81 (06 Sep 2019 20:00) (128/81 - 203/87)  RR: 20 (06 Sep 2019 20:00) (20 - 22)  SpO2: 95% (06 Sep 2019 20:00) (89% - 95%)  CXR showed RLL opacity, with no leukocytosis, received Rocephin and azithromycin     REVIEW OF SYSTEMS: see cc/HPI  CONSTITUTIONAL: No weakness, fevers or chills  EYES/ENT: No visual changes;  No vertigo or throat pain   NECK: No pain or stiffness  RESPIRATORY: No cough, wheezing, hemoptysis; No shortness of breath  CARDIOVASCULAR: No chest pain or palpitations  GASTROINTESTINAL: No abdominal or epigastric pain. No nausea, vomiting, or hematemesis; No diarrhea or constipation. No melena or hematochezia.  GENITOURINARY: No dysuria, frequency or hematuria  NEUROLOGICAL: No numbness or weakness  SKIN: No itching, rashes    Physical Exam:   General: WN/WD NAD  Neurology: A&Ox3, nonfocal, follows commands  Eyes: PERRLA/ EOMI  ENT/Neck: Neck supple, trachea midline, No JVD  Respiratory: CTA B/L, No wheezing, rales, rhonchi  CV: Normal rate regular rhythm, S1S2, no murmurs, rubs or gallops  Abdominal: Soft, NT, ND +BS,   Extremities: No edema, + peripheral pulses  Skin: No Rashes, Hematoma, Ecchymosis  Incisions:   Tubes: 79 year old female with a history of afib on coumadin, HTN, HLD, COPD (not on home 02) presents here for worsening sob for 5 days duration.    patient was doing well until 4 days prior to presentation when she started productive cough and worsening SOB, with no fever or chills, no sick contact,   Patient denies cp n/v abdominal pain, urinary or symptoms   in ed:  T(C): 36.8 (06 Sep 2019 20:00), Max: 36.8 (06 Sep 2019 20:00)  T(F): 98.2 (06 Sep 2019 20:00), Max: 98.2 (06 Sep 2019 20:00)  HR: 110 (06 Sep 2019 20:00) (101 - 120)  BP: 128/81 (06 Sep 2019 20:00) (128/81 - 203/87)  RR: 20 (06 Sep 2019 20:00) (20 - 22)  SpO2: 95% (06 Sep 2019 20:00) (89% - 95%)  CXR showed RLL opacity, with no leukocytosis, received Rocephin and azithromycin     REVIEW OF SYSTEMS: see cc/HPI  CONSTITUTIONAL: No weakness, fevers or chills  EYES/ENT: No visual changes;  No vertigo or throat pain   NECK: No pain or stiffness  RESPIRATORY: No cough, wheezing, hemoptysis; No shortness of breath  CARDIOVASCULAR: No chest pain or palpitations  GASTROINTESTINAL: No abdominal or epigastric pain. No nausea, vomiting, or hematemesis; No diarrhea or constipation. No melena or hematochezia.  GENITOURINARY: No dysuria, frequency or hematuria  NEUROLOGICAL: No numbness or weakness  SKIN: No itching, rashes    Physical Exam:   General: WN/WD NAD  Neurology: A&Ox3, nonfocal, follows commands  Eyes: PERRLA/ EOMI  ENT/Neck: Neck supple, trachea midline, No JVD  Respiratory: CTA B/L, No wheezing, rales, rhonchi  CV: Normal rate regular rhythm, S1S2, no murmurs, rubs or gallops  Abdominal: Soft, NT, ND +BS,   Extremities: No edema, + peripheral pulses  Skin: No Rashes, Hematoma, Ecchymosis  Incisions:   Tubes:    A/P  COPD exacerbation / CAP   -follow up official read of CXR A/P and lat  -agree w/ RVP   -IV steroids, Nebs , O2 PRN w/ pulse ox monitoring   -IV abx   -repeat WBC and check blood Cx  A. Fib   -c/w current Rx  -check Dig level    HTN- elevated earlier   -c/w above Rx 79 year old female with a history of afib on coumadin, HTN, HLD, COPD (not on home 02) presents here for worsening sob for 5 days duration.    patient was doing well until 4 days prior to presentation when she started productive cough and worsening SOB, with no fever or chills, no sick contact,   Patient denies cp n/v abdominal pain, urinary or symptoms   in ed:  T(C): 36.8 (06 Sep 2019 20:00), Max: 36.8 (06 Sep 2019 20:00)  T(F): 98.2 (06 Sep 2019 20:00), Max: 98.2 (06 Sep 2019 20:00)  HR: 110 (06 Sep 2019 20:00) (101 - 120)  BP: 128/81 (06 Sep 2019 20:00) (128/81 - 203/87)  RR: 20 (06 Sep 2019 20:00) (20 - 22)  SpO2: 95% (06 Sep 2019 20:00) (89% - 95%)  CXR showed RLL opacity, with no leukocytosis, received Rocephin and azithromycin     REVIEW OF SYSTEMS: see cc/HPI  CONSTITUTIONAL: No weakness, fevers or chills  EYES/ENT: No visual changes;  No vertigo or throat pain   NECK: No pain or stiffness  RESPIRATORY: (+) productive cough and wheezing, hemoptysis; (+) shortness of breath  CARDIOVASCULAR: No chest pain or palpitations  GASTROINTESTINAL: No abdominal or epigastric pain. No nausea, vomiting, or hematemesis; No diarrhea or constipation. No melena or hematochezia.  GENITOURINARY: No dysuria, frequency or hematuria  NEUROLOGICAL: No numbness or weakness  SKIN: No itching, rashes    Physical Exam:   General: WN/WD NAD, Using N/C O2  Neurology: A&Ox3, nonfocal, follows commands  Eyes: PERRLA/ EOMI  ENT/Neck: Neck supple, trachea midline, No JVD  Respiratory: B/L scattered wheezing and decreased breath sounds, No rales, (+) coarse rhonchi  CV: Normal rate regular rhythm, S1S2, no murmurs, rubs or gallops  Abdominal: Soft, NT, ND +BS,   Extremities: No edema, + peripheral pulses  Skin: No Rashes, Hematoma, Ecchymosis  Incisions:   Tubes:    A/P  COPD exacerbation / CAP   -follow up official read of CXR A/P and lat  -agree w/ RVP   -IV steroids, Nebs , O2 PRN w/ pulse ox monitoring   -IV abx   -repeat WBC and check blood Cx  A. Fib   -c/w current Rx  -check Dig level    HTN- elevated earlier   -c/w above Rx

## 2019-09-06 NOTE — ED PROVIDER NOTE - CLINICAL SUMMARY MEDICAL DECISION MAKING FREE TEXT BOX
pw SOB, cough, in COPD exacerbation. imaging shows new opacity and air bronchogram on right lower lobe. Abx started. resp status relatively improved but insufficient for home management of symptoms. Rate controlled with home medications. Patient to be admitted to an inpatient floor. Case discussed with and care endorsed to medical admitting resident. Admitting physician notified.

## 2019-09-06 NOTE — ED PROVIDER NOTE - PHYSICAL EXAMINATION
CONSTITUTIONAL: WA / WN / NAD  HEAD: NCAT  EYES: PERRL; EOMI; anicteric.  ENT: Normal pharynx; mucous membranes pink/moist, no erythema.  NECK: Supple; no meningeal signs  CARD: IRR; nl S1/S2; no M/R/G  RESP: Respiratory rate and effort are normal; + crackle/ wheezing left lower lobe  ABD: Soft, NT ND nl bowel sounds; no masses; no rebound  MSK/EXT: No gross deformities; full range of motion.  SKIN: Warm and dry;   NEURO: AAOx3,   PSYCH: Memory Intact, Normal Affect

## 2019-09-06 NOTE — ED PROVIDER NOTE - PROGRESS NOTE DETAILS
Lungs: b/l wheezing throughout, louder with somewhat improved air movement, increased rhonchi to right base and midrange laterally. +pursed lip breathing, no retractions or tripodding, no focal areas of decreased breath sounds, no stridor, managing secretions. Overall improved from prior.

## 2019-09-07 LAB
ANION GAP SERPL CALC-SCNC: 13 MMOL/L — SIGNIFICANT CHANGE UP (ref 7–14)
APTT BLD: 26.8 SEC — LOW (ref 27–39.2)
BASOPHILS # BLD AUTO: 0 K/UL — SIGNIFICANT CHANGE UP (ref 0–0.2)
BASOPHILS NFR BLD AUTO: 0 % — SIGNIFICANT CHANGE UP (ref 0–1)
BUN SERPL-MCNC: 15 MG/DL — SIGNIFICANT CHANGE UP (ref 10–20)
CALCIUM SERPL-MCNC: 9 MG/DL — SIGNIFICANT CHANGE UP (ref 8.5–10.1)
CHLORIDE SERPL-SCNC: 96 MMOL/L — LOW (ref 98–110)
CO2 SERPL-SCNC: 29 MMOL/L — SIGNIFICANT CHANGE UP (ref 17–32)
CREAT SERPL-MCNC: 0.7 MG/DL — SIGNIFICANT CHANGE UP (ref 0.7–1.5)
EOSINOPHIL # BLD AUTO: 0 K/UL — SIGNIFICANT CHANGE UP (ref 0–0.7)
EOSINOPHIL NFR BLD AUTO: 0 % — SIGNIFICANT CHANGE UP (ref 0–8)
GLUCOSE SERPL-MCNC: 142 MG/DL — HIGH (ref 70–99)
HCT VFR BLD CALC: 35.2 % — LOW (ref 37–47)
HGB BLD-MCNC: 11.1 G/DL — LOW (ref 12–16)
IMM GRANULOCYTES NFR BLD AUTO: 0.3 % — SIGNIFICANT CHANGE UP (ref 0.1–0.3)
INR BLD: 2.24 RATIO — HIGH (ref 0.65–1.3)
LYMPHOCYTES # BLD AUTO: 0.23 K/UL — LOW (ref 1.2–3.4)
LYMPHOCYTES # BLD AUTO: 6.5 % — LOW (ref 20.5–51.1)
MCHC RBC-ENTMCNC: 26.7 PG — LOW (ref 27–31)
MCHC RBC-ENTMCNC: 31.5 G/DL — LOW (ref 32–37)
MCV RBC AUTO: 84.6 FL — SIGNIFICANT CHANGE UP (ref 81–99)
MONOCYTES # BLD AUTO: 0.05 K/UL — LOW (ref 0.1–0.6)
MONOCYTES NFR BLD AUTO: 1.4 % — LOW (ref 1.7–9.3)
NEUTROPHILS # BLD AUTO: 3.27 K/UL — SIGNIFICANT CHANGE UP (ref 1.4–6.5)
NEUTROPHILS NFR BLD AUTO: 91.8 % — HIGH (ref 42.2–75.2)
NRBC # BLD: 0 /100 WBCS — SIGNIFICANT CHANGE UP (ref 0–0)
PLATELET # BLD AUTO: 279 K/UL — SIGNIFICANT CHANGE UP (ref 130–400)
POTASSIUM SERPL-MCNC: 4.1 MMOL/L — SIGNIFICANT CHANGE UP (ref 3.5–5)
POTASSIUM SERPL-SCNC: 4.1 MMOL/L — SIGNIFICANT CHANGE UP (ref 3.5–5)
PROTHROM AB SERPL-ACNC: 25.6 SEC — HIGH (ref 9.95–12.87)
RBC # BLD: 4.16 M/UL — LOW (ref 4.2–5.4)
RBC # FLD: 14.6 % — HIGH (ref 11.5–14.5)
SODIUM SERPL-SCNC: 138 MMOL/L — SIGNIFICANT CHANGE UP (ref 135–146)
TROPONIN T SERPL-MCNC: <0.01 NG/ML — SIGNIFICANT CHANGE UP
WBC # BLD: 3.56 K/UL — LOW (ref 4.8–10.8)
WBC # FLD AUTO: 3.56 K/UL — LOW (ref 4.8–10.8)

## 2019-09-07 PROCEDURE — 93010 ELECTROCARDIOGRAM REPORT: CPT

## 2019-09-07 PROCEDURE — 99223 1ST HOSP IP/OBS HIGH 75: CPT | Mod: AI

## 2019-09-07 RX ORDER — ZOLPIDEM TARTRATE 10 MG/1
5 TABLET ORAL ONCE
Refills: 0 | Status: DISCONTINUED | OUTPATIENT
Start: 2019-09-07 | End: 2019-09-07

## 2019-09-07 RX ORDER — LANOLIN ALCOHOL/MO/W.PET/CERES
5 CREAM (GRAM) TOPICAL AT BEDTIME
Refills: 0 | Status: DISCONTINUED | OUTPATIENT
Start: 2019-09-07 | End: 2019-09-13

## 2019-09-07 RX ORDER — WARFARIN SODIUM 2.5 MG/1
5 TABLET ORAL AT BEDTIME
Refills: 0 | Status: COMPLETED | OUTPATIENT
Start: 2019-09-07 | End: 2019-09-07

## 2019-09-07 RX ORDER — PANTOPRAZOLE SODIUM 20 MG/1
40 TABLET, DELAYED RELEASE ORAL
Refills: 0 | Status: DISCONTINUED | OUTPATIENT
Start: 2019-09-07 | End: 2019-09-13

## 2019-09-07 RX ADMIN — ATORVASTATIN CALCIUM 10 MILLIGRAM(S): 80 TABLET, FILM COATED ORAL at 00:17

## 2019-09-07 RX ADMIN — WARFARIN SODIUM 5 MILLIGRAM(S): 2.5 TABLET ORAL at 21:46

## 2019-09-07 RX ADMIN — WARFARIN SODIUM 5 MILLIGRAM(S): 2.5 TABLET ORAL at 00:17

## 2019-09-07 RX ADMIN — Medication 81 MILLIGRAM(S): at 11:35

## 2019-09-07 RX ADMIN — Medication 60 MILLIGRAM(S): at 11:37

## 2019-09-07 RX ADMIN — Medication 60 MILLIGRAM(S): at 17:40

## 2019-09-07 RX ADMIN — ATORVASTATIN CALCIUM 10 MILLIGRAM(S): 80 TABLET, FILM COATED ORAL at 21:46

## 2019-09-07 RX ADMIN — PANTOPRAZOLE SODIUM 40 MILLIGRAM(S): 20 TABLET, DELAYED RELEASE ORAL at 11:36

## 2019-09-07 RX ADMIN — AZITHROMYCIN 255 MILLIGRAM(S): 500 TABLET, FILM COATED ORAL at 17:40

## 2019-09-07 RX ADMIN — CEFTRIAXONE 100 MILLIGRAM(S): 500 INJECTION, POWDER, FOR SOLUTION INTRAMUSCULAR; INTRAVENOUS at 23:53

## 2019-09-07 RX ADMIN — Medication 5 MILLIGRAM(S): at 21:46

## 2019-09-07 RX ADMIN — CEFTRIAXONE 100 MILLIGRAM(S): 500 INJECTION, POWDER, FOR SOLUTION INTRAMUSCULAR; INTRAVENOUS at 00:16

## 2019-09-07 RX ADMIN — ZOLPIDEM TARTRATE 5 MILLIGRAM(S): 10 TABLET ORAL at 00:15

## 2019-09-07 RX ADMIN — Medication 360 MILLIGRAM(S): at 06:18

## 2019-09-07 RX ADMIN — Medication 3 MILLILITER(S): at 08:02

## 2019-09-07 RX ADMIN — Medication 60 MILLIGRAM(S): at 00:18

## 2019-09-07 RX ADMIN — Medication 5 MILLIGRAM(S): at 06:18

## 2019-09-07 RX ADMIN — Medication 3 MILLILITER(S): at 13:21

## 2019-09-07 RX ADMIN — Medication 0.25 MILLIGRAM(S): at 06:18

## 2019-09-07 RX ADMIN — Medication 3 MILLILITER(S): at 20:16

## 2019-09-07 NOTE — PATIENT PROFILE ADULT - ABILITY TO HEAR (WITH HEARING AID OR HEARING APPLIANCE IF NORMALLY USED):
Mildly to Moderately Impaired: difficulty hearing in some environments or speaker may need to increase volume or speak distinctly/Pt states that she has B/L hearing aids at home.

## 2019-09-07 NOTE — PATIENT PROFILE ADULT - IS THERE A SUSPICION OF ABUSE/NEGLIGENCE?
Review of Systems    Constitutional: (-) fever/ chills   Eyes/ENT: (-) blurry vision, (-) epistaxis   Cardiovascular: (-) chest pain, (-) syncope   Respiratory: (-) cough, (-) shortness of breath  Gastrointestinal: (-) vomiting, (-) diarrhea (-) abdominal pain  Musculoskeletal: (-) neck pain, (-) back pain,  Integumentary: (-) rash, (-) swelling  Neurological: (-) headache, (-) altered mental status  Psychiatric: (-) hallucinations or depression +anxiety and unable to focus  Allergic/Immunologic: (-) pruritus no

## 2019-09-07 NOTE — PATIENT PROFILE ADULT - NSTRANSFERBELONGINGSDISPO_GEN_A_NUR
[facility]  [facility]    Fingertip Application Method for Estrogen Vaginal Cream    1. Wash you hands with soap and water and dry thoroughly.     2.  Squeeze out enough cream from the tube to cover 1/2 of your index fin with patient This technique, combined with pelvic floor exercises and possibly medication, can give you complete dryness. This does not work over night, you must be patient with yourself.   This condition occurs over a period of time and will improve gradually with pe pelvic floor muscles and timed voiding protocol or your symptoms may return,  Please discuss this with your health care provider so that (s)he may monitor your progress.

## 2019-09-08 LAB
INR BLD: 2.84 RATIO — HIGH (ref 0.65–1.3)
PROTHROM AB SERPL-ACNC: 32.3 SEC — HIGH (ref 9.95–12.87)

## 2019-09-08 PROCEDURE — 99233 SBSQ HOSP IP/OBS HIGH 50: CPT

## 2019-09-08 RX ORDER — ZOLPIDEM TARTRATE 10 MG/1
5 TABLET ORAL AT BEDTIME
Refills: 0 | Status: DISCONTINUED | OUTPATIENT
Start: 2019-09-08 | End: 2019-09-13

## 2019-09-08 RX ORDER — WARFARIN SODIUM 2.5 MG/1
4 TABLET ORAL ONCE
Refills: 0 | Status: COMPLETED | OUTPATIENT
Start: 2019-09-08 | End: 2019-09-08

## 2019-09-08 RX ORDER — FUROSEMIDE 40 MG
40 TABLET ORAL DAILY
Refills: 0 | Status: DISCONTINUED | OUTPATIENT
Start: 2019-09-08 | End: 2019-09-12

## 2019-09-08 RX ADMIN — Medication 100 MILLIGRAM(S): at 11:20

## 2019-09-08 RX ADMIN — Medication 3 MILLILITER(S): at 13:22

## 2019-09-08 RX ADMIN — Medication 3 MILLILITER(S): at 07:48

## 2019-09-08 RX ADMIN — Medication 360 MILLIGRAM(S): at 05:48

## 2019-09-08 RX ADMIN — Medication 60 MILLIGRAM(S): at 17:43

## 2019-09-08 RX ADMIN — ATORVASTATIN CALCIUM 10 MILLIGRAM(S): 80 TABLET, FILM COATED ORAL at 21:36

## 2019-09-08 RX ADMIN — AZITHROMYCIN 255 MILLIGRAM(S): 500 TABLET, FILM COATED ORAL at 17:43

## 2019-09-08 RX ADMIN — Medication 3 MILLILITER(S): at 20:02

## 2019-09-08 RX ADMIN — Medication 5 MILLIGRAM(S): at 05:48

## 2019-09-08 RX ADMIN — PANTOPRAZOLE SODIUM 40 MILLIGRAM(S): 20 TABLET, DELAYED RELEASE ORAL at 05:48

## 2019-09-08 RX ADMIN — Medication 40 MILLIGRAM(S): at 13:20

## 2019-09-08 RX ADMIN — CEFTRIAXONE 100 MILLIGRAM(S): 500 INJECTION, POWDER, FOR SOLUTION INTRAMUSCULAR; INTRAVENOUS at 23:34

## 2019-09-08 RX ADMIN — Medication 60 MILLIGRAM(S): at 05:48

## 2019-09-08 RX ADMIN — ZOLPIDEM TARTRATE 5 MILLIGRAM(S): 10 TABLET ORAL at 23:35

## 2019-09-08 RX ADMIN — Medication 0.25 MILLIGRAM(S): at 05:48

## 2019-09-08 RX ADMIN — Medication 100 MILLIGRAM(S): at 23:35

## 2019-09-08 RX ADMIN — Medication 81 MILLIGRAM(S): at 11:20

## 2019-09-08 RX ADMIN — CHLORHEXIDINE GLUCONATE 1 APPLICATION(S): 213 SOLUTION TOPICAL at 05:48

## 2019-09-08 RX ADMIN — WARFARIN SODIUM 4 MILLIGRAM(S): 2.5 TABLET ORAL at 21:36

## 2019-09-08 NOTE — PROGRESS NOTE ADULT - ASSESSMENT
79 year old female with a history of afib on coumadin, htn, hld, copd (not on home 02) presents here for worsening sob for the last several days.    # COPD exacerbation possibly due to RLL PNA vs fluid overload   - IV solumedrol 60 BID for another 24 hours still has decreased air movement   - nebs q6   - c/w ceftriaxone and azithromycin   - f/u s/s r/o aspiration   - ambulation o2 sat  -will start on Lasix as BNP is elevated, check echo  -keep I<O    #Abnormal EKG. No CP  -check echo  -outpt f/u with Dr. Hairston    # afib rate controlled   - c/w Cardizem 360 digoxin and coumadin 5 QD  - dig level 1 noted     # Insomnia  - patient states that only ambient wrks but PMD refuses to prescribe in light of COPD and CO2 retension which is reasonable  -outpt sleep specialist referral       # HTN  c/w enalapril    # DVT ppx on AC  # DASH diet   # FULL code   Dispo as per PT 79 year old female with a history of afib on coumadin, htn, hld, copd (not on home 02) presents here for worsening sob for the last several days.    # COPD exacerbation possibly due to RLL PNA vs fluid overload   - IV solumedrol 60 BID for another 24 hours still has decreased air movement   - nebs q6   - c/w ceftriaxone and azithromycin   - f/u s/s r/o aspiration   - ambulation o2 sat  -will start on Lasix as BNP is elevated, check echo  -keep I<O  -repeat CXR in am    #Abnormal EKG. No CP  -check echo  -outpt f/u with Dr. Hairston    # afib rate controlled   - c/w Cardizem 360 digoxin and coumadin 5 QD  - dig level 1 noted     # Insomnia  - patient states that only ambient wrks but PMD refuses to prescribe in light of COPD and CO2 retension which is reasonable  -outpt sleep specialist referral       # HTN  c/w enalapril    # DVT ppx on AC  # DASH diet   # FULL code   Dispo as per PT     #Progress Note Handoff  Pending (specify):  Consults_________, Tests_Echo_______,Medical stability____x_____, PT________  Pt/Family discussion: Pt informed and agrees with the current plan  Disposition: Home__x____/SNF_______/4A________/To be determined________/Waiting for Auth_____

## 2019-09-08 NOTE — PROGRESS NOTE ADULT - ASSESSMENT
79 year old female with a history of afib on coumadin, htn, hld, copd (not on home 02) presents here for worsening sob for the last several days.    # COPD exacerbation possibly due to RLL Aspiration PNA/ Pneumonitis   - Non septic on admission   - PA/ Lateral CXR   - IV solumedrol 60 BID   - nebs q6   - ceftriaxone and azithromycin   - f/u s/s     # afib rate controlled   - c/w Cardizem 360?, digoxin and coumadin 5 QD      # Insomnia  - patient states that only ambient wrks but PMD refuses to prescribe in light of COPD?       # HTN  c/w enalapril    # DVT ppx on AC  # DASH diet   # FULL code   Dispo as per PT 79 year old female with a history of afib on coumadin, htn, hld, copd (not on home 02) presents here for worsening sob for the last several days.        Possible DC  in 72hr if able to wean O2.      # COPD exacerbation possibly due to RLL Aspiration PNA/ Pneumonitis   - Non septic on admission   - PA/ Lateral CXR   - IV solumedrol 60 BID for another 24 hours still has decreased air movement   - nebs q6   - c/w ceftriaxone and azithromycin   - f/u s/s r/o aspiration   - ambulation o2 sat       # afib rate controlled   - c/w Cardizem 360? digoxin and coumadin 5 QD  - dig level 1 noted     # Insomnia  - patient states that only ambient wrks but PMD refuses to prescribe in light of COPD?       # HTN  c/w enalapril    # DVT ppx on AC  # DASH diet   # FULL code   Dispo as per PT

## 2019-09-08 NOTE — PROGRESS NOTE ADULT - SUBJECTIVE AND OBJECTIVE BOX
LENGTH OF HOSPITAL STAY: 2d      CHIEF COMPLAINT:   Patient is a 79y old  Female who presents with a chief complaint of SOB (06 Sep 2019 20:15)      OVER Past 24hrs:  The patient was seen and examined at bedside there were no acute events during the night. Patient remains on NC 2L but states that SOb has improved since admission. She deneis fever chills or chest pain complains of insomnia.         PAST MEDICAL & SURGICAL HISTORY  PAST MEDICAL & SURGICAL HISTORY:  COPD without exacerbation  Afib        REVIEW OF SYSTEMS  CONSTITUTIONAL: No fever  RESPIRATORY: No cough, wheezing, chills or hemoptysis; mild shortness of breath  CARDIOVASCULAR: No chest pain, palpitations, dizziness, or leg swelling  GASTROINTESTINAL: No diarrhea or constipation.   GENITOURINARY: No dysuria,   PSYCHIATRIC: complains of  difficulty sleeping      ALLERGIES:  No Known Allergies    MEDICATIONS:  STANDING MEDICATIONS  ALBUTerol/ipratropium for Nebulization 3 milliLiter(s) Nebulizer every 6 hours  aspirin  chewable 81 milliGRAM(s) Oral daily  atorvastatin 10 milliGRAM(s) Oral at bedtime  azithromycin  IVPB 500 milliGRAM(s) IV Intermittent every 24 hours  cefTRIAXone   IVPB 1000 milliGRAM(s) IV Intermittent every 24 hours  chlorhexidine 4% Liquid 1 Application(s) Topical <User Schedule>  digoxin     Tablet 0.25 milliGRAM(s) Oral daily  diltiazem    milliGRAM(s) Oral daily  enalapril 5 milliGRAM(s) Oral daily  melatonin 5 milliGRAM(s) Oral at bedtime  methylPREDNISolone sodium succinate Injectable 60 milliGRAM(s) IV Push every 12 hours  pantoprazole    Tablet 40 milliGRAM(s) Oral before breakfast    PRN MEDICATIONS  guaiFENesin    Syrup 100 milliGRAM(s) Oral every 6 hours PRN    VITALS:   T(F): 97.7  HR: 99  BP: 149/60  RR: 18  SpO2: 94%    PHYSICAL EXAM:  General: No acute distress.  Alert, oriented, interactive, nonfocal.    HEENT: Pupils equal, reactive to light symmetrically.    PULM: decreased breath sounds  bilaterally, no significant sputum production.    CVS: IRRegular rate and rhythm, no murmurs, rubs, or gallops.    ABD: Soft, nondistended, nontender, no masses.    EXT: No edema, nontender.    SKIN: Warm and well perfused, no rashes noted.    LABS:                        11.1   3.56  )-----------( 279      ( 07 Sep 2019 05:18 )             35.2     09-07    138  |  96<L>  |  15  ----------------------------<  142<H>  4.1   |  29  |  0.7    Ca    9.0      07 Sep 2019 05:18    TPro  6.5  /  Alb  3.8  /  TBili  0.5  /  DBili  x   /  AST  22  /  ALT  27  /  AlkPhos  96  09-06    PT/INR - ( 08 Sep 2019 05:06 )   PT: 32.30 sec;   INR: 2.84 ratio         PTT - ( 07 Sep 2019 05:18 )  PTT:26.8 sec          CARDIAC MARKERS ( 07 Sep 2019 11:57 )  x     / <0.01 ng/mL / x     / x     / x      CARDIAC MARKERS ( 06 Sep 2019 18:00 )  x     / <0.01 ng/mL / x     / x     / x          RADIOLOGY:        < from: Xray Chest 2 Views PA/Lat (09.06.19 @ 21:23) >  Impression:      Right basilar airspace opacity, which may represent pneumonia in the   appropriate clinical setting. Follow-up imaging to resolution recommended.    < end of copied text >  < from: 12 Lead ECG (09.06.19 @ 15:01) >  Ventricular Rate 124 BPM    Atrial Rate 120 BPM    QRS Duration 76 ms    Q-T Interval 308 ms    QTC Calculation(Bezet) 442 ms    R Axis 89 degrees    T Axis 268 degrees    Diagnosis Line *** Poor data quality, interpretation may be adversely affected  Atrial fibrillation with rapid ventricular response  Anterior infarct , age undetermined  Marked ST abnormality, possible inferior subendocardial injury  Abnormal ECG    < end of copied text >

## 2019-09-08 NOTE — PROGRESS NOTE ADULT - SUBJECTIVE AND OBJECTIVE BOX
Patient is a 79y old  Female who presents with a chief complaint of SOB (08 Sep 2019 12:27)    INTERVAL HPI/OVERNIGHT EVENTS: feels breathing is slightly better, no CP. Reports chronic insomnia over the last few years and upset her PMD cut her off Ambien.  ROS: Denies CP, AP, new weakness  All other systems reviewed and are within normal limits.  HPI:  79 year old female with a history of afib on coumadin, HTN, HLD, COPD (not on home 02) presents here for worsening sob for 5 days duration.    patient was doing well until 4 days prior to presentation when she started productive cough and worsening SOB, with no fever or chills, no sick contact,   Patient denies cp n/v abdominal pain, urinary or symptoms   in ed:  T(C): 36.8 (06 Sep 2019 20:00), Max: 36.8 (06 Sep 2019 20:00)  T(F): 98.2 (06 Sep 2019 20:00), Max: 98.2 (06 Sep 2019 20:00)  HR: 110 (06 Sep 2019 20:00) (101 - 120)  BP: 128/81 (06 Sep 2019 20:00) (128/81 - 203/87)  RR: 20 (06 Sep 2019 20:00) (20 - 22)  SpO2: 95% (06 Sep 2019 20:00) (89% - 95%)  CXR showed RLL opacity, with no leukocytosis, received Rocephin and azithromycin (06 Sep 2019 20:15)    COPD EXACERBATION ATRIAL FIBRILLATION WITH RVR  ^SOB  FH: HTN (hypertension)  Handoff  MEWS Score  COPD without exacerbation  Afib  COPD exacerbation  SOB  Atrial fibrillation with RVR      General: NAD, AAO3  HEENT: PERRLA, EOMI, no LAD  CV: S1 S2  Resp: decreased breath sounds at bases  GI: NT/ND/S +BS  MS: no clubbing/cyanosis/edema, 2+ pulses b/l  Neuro: nonfocal, 2+reflexes thruout    MEDICATIONS  (STANDING):  ALBUTerol/ipratropium for Nebulization 3 milliLiter(s) Nebulizer every 6 hours  aspirin  chewable 81 milliGRAM(s) Oral daily  atorvastatin 10 milliGRAM(s) Oral at bedtime  azithromycin  IVPB 500 milliGRAM(s) IV Intermittent every 24 hours  cefTRIAXone   IVPB 1000 milliGRAM(s) IV Intermittent every 24 hours  chlorhexidine 4% Liquid 1 Application(s) Topical <User Schedule>  digoxin     Tablet 0.25 milliGRAM(s) Oral daily  diltiazem    milliGRAM(s) Oral daily  enalapril 5 milliGRAM(s) Oral daily  furosemide    Tablet 40 milliGRAM(s) Oral daily  melatonin 5 milliGRAM(s) Oral at bedtime  methylPREDNISolone sodium succinate Injectable 60 milliGRAM(s) IV Push every 12 hours  pantoprazole    Tablet 40 milliGRAM(s) Oral before breakfast  warfarin 4 milliGRAM(s) Oral once    MEDICATIONS  (PRN):  guaiFENesin    Syrup 100 milliGRAM(s) Oral every 6 hours PRN Cough    Home Medications:    Vital Signs Last 24 Hrs  T(C): 36.6 (08 Sep 2019 13:17), Max: 36.6 (08 Sep 2019 13:17)  T(F): 97.9 (08 Sep 2019 13:17), Max: 97.9 (08 Sep 2019 13:17)  HR: 89 (08 Sep 2019 13:17) (86 - 99)  BP: 119/56 (08 Sep 2019 13:17) (119/56 - 149/60)  BP(mean): --  RR: 18 (08 Sep 2019 13:17) (18 - 18)  SpO2: 94% (08 Sep 2019 08:58) (94% - 94%)  CAPILLARY BLOOD GLUCOSE        LABS:                        11.1   3.56  )-----------( 279      ( 07 Sep 2019 05:18 )             35.2     09-07    138  |  96<L>  |  15  ----------------------------<  142<H>  4.1   |  29  |  0.7    Ca    9.0      07 Sep 2019 05:18    TPro  6.5  /  Alb  3.8  /  TBili  0.5  /  DBili  x   /  AST  22  /  ALT  27  /  AlkPhos  96  09-06    LIVER FUNCTIONS - ( 06 Sep 2019 18:00 )  Alb: 3.8 g/dL / Pro: 6.5 g/dL / ALK PHOS: 96 U/L / ALT: 27 U/L / AST: 22 U/L / GGT: x           CARDIAC MARKERS ( 07 Sep 2019 11:57 )  x     / <0.01 ng/mL / x     / x     / x      CARDIAC MARKERS ( 06 Sep 2019 18:00 )  x     / <0.01 ng/mL / x     / x     / x          PT/INR - ( 08 Sep 2019 05:06 )   PT: 32.30 sec;   INR: 2.84 ratio         PTT - ( 07 Sep 2019 05:18 )  PTT:26.8 sec            Chart, Consultant(s) Notes Reviewed:  [x ] YES  [ ] NO  Care Discussed with Consultants/Other Providers/ Housestaff [ x] YES  [ ] NO  Radiology, labs, old records personally reviewed.

## 2019-09-09 ENCOUNTER — TRANSCRIPTION ENCOUNTER (OUTPATIENT)
Age: 79
End: 2019-09-09

## 2019-09-09 LAB
ANION GAP SERPL CALC-SCNC: 10 MMOL/L — SIGNIFICANT CHANGE UP (ref 7–14)
BASOPHILS # BLD AUTO: 0.01 K/UL — SIGNIFICANT CHANGE UP (ref 0–0.2)
BASOPHILS NFR BLD AUTO: 0.1 % — SIGNIFICANT CHANGE UP (ref 0–1)
BUN SERPL-MCNC: 36 MG/DL — HIGH (ref 10–20)
CALCIUM SERPL-MCNC: 8.5 MG/DL — SIGNIFICANT CHANGE UP (ref 8.5–10.1)
CHLORIDE SERPL-SCNC: 95 MMOL/L — LOW (ref 98–110)
CO2 SERPL-SCNC: 29 MMOL/L — SIGNIFICANT CHANGE UP (ref 17–32)
CREAT SERPL-MCNC: 1 MG/DL — SIGNIFICANT CHANGE UP (ref 0.7–1.5)
EOSINOPHIL # BLD AUTO: 0 K/UL — SIGNIFICANT CHANGE UP (ref 0–0.7)
EOSINOPHIL NFR BLD AUTO: 0 % — SIGNIFICANT CHANGE UP (ref 0–8)
GLUCOSE SERPL-MCNC: 146 MG/DL — HIGH (ref 70–99)
HCT VFR BLD CALC: 34.8 % — LOW (ref 37–47)
HGB BLD-MCNC: 10.8 G/DL — LOW (ref 12–16)
IMM GRANULOCYTES NFR BLD AUTO: 0.5 % — HIGH (ref 0.1–0.3)
INR BLD: 2.75 RATIO — HIGH (ref 0.65–1.3)
LYMPHOCYTES # BLD AUTO: 0.29 K/UL — LOW (ref 1.2–3.4)
LYMPHOCYTES # BLD AUTO: 2.5 % — LOW (ref 20.5–51.1)
MAGNESIUM SERPL-MCNC: 2.2 MG/DL — SIGNIFICANT CHANGE UP (ref 1.8–2.4)
MCHC RBC-ENTMCNC: 26.9 PG — LOW (ref 27–31)
MCHC RBC-ENTMCNC: 31 G/DL — LOW (ref 32–37)
MCV RBC AUTO: 86.6 FL — SIGNIFICANT CHANGE UP (ref 81–99)
MONOCYTES # BLD AUTO: 0.32 K/UL — SIGNIFICANT CHANGE UP (ref 0.1–0.6)
MONOCYTES NFR BLD AUTO: 2.8 % — SIGNIFICANT CHANGE UP (ref 1.7–9.3)
NEUTROPHILS # BLD AUTO: 10.82 K/UL — HIGH (ref 1.4–6.5)
NEUTROPHILS NFR BLD AUTO: 94.1 % — HIGH (ref 42.2–75.2)
NRBC # BLD: 0 /100 WBCS — SIGNIFICANT CHANGE UP (ref 0–0)
PHOSPHATE SERPL-MCNC: 5.1 MG/DL — HIGH (ref 2.1–4.9)
PLATELET # BLD AUTO: 306 K/UL — SIGNIFICANT CHANGE UP (ref 130–400)
POTASSIUM SERPL-MCNC: 5.1 MMOL/L — HIGH (ref 3.5–5)
POTASSIUM SERPL-SCNC: 5.1 MMOL/L — HIGH (ref 3.5–5)
PROTHROM AB SERPL-ACNC: 31.3 SEC — HIGH (ref 9.95–12.87)
RBC # BLD: 4.02 M/UL — LOW (ref 4.2–5.4)
RBC # FLD: 15.1 % — HIGH (ref 11.5–14.5)
SODIUM SERPL-SCNC: 134 MMOL/L — LOW (ref 135–146)
WBC # BLD: 11.5 K/UL — HIGH (ref 4.8–10.8)
WBC # FLD AUTO: 11.5 K/UL — HIGH (ref 4.8–10.8)

## 2019-09-09 PROCEDURE — 93306 TTE W/DOPPLER COMPLETE: CPT | Mod: 26

## 2019-09-09 PROCEDURE — 71045 X-RAY EXAM CHEST 1 VIEW: CPT | Mod: 26

## 2019-09-09 PROCEDURE — 99233 SBSQ HOSP IP/OBS HIGH 50: CPT

## 2019-09-09 RX ORDER — IPRATROPIUM/ALBUTEROL SULFATE 18-103MCG
3 AEROSOL WITH ADAPTER (GRAM) INHALATION EVERY 6 HOURS
Refills: 0 | Status: DISCONTINUED | OUTPATIENT
Start: 2019-09-09 | End: 2019-09-13

## 2019-09-09 RX ORDER — WARFARIN SODIUM 2.5 MG/1
4 TABLET ORAL ONCE
Refills: 0 | Status: COMPLETED | OUTPATIENT
Start: 2019-09-09 | End: 2019-09-09

## 2019-09-09 RX ADMIN — Medication 100 MILLIGRAM(S): at 23:31

## 2019-09-09 RX ADMIN — Medication 40 MILLIGRAM(S): at 05:54

## 2019-09-09 RX ADMIN — Medication 81 MILLIGRAM(S): at 14:26

## 2019-09-09 RX ADMIN — PANTOPRAZOLE SODIUM 40 MILLIGRAM(S): 20 TABLET, DELAYED RELEASE ORAL at 05:54

## 2019-09-09 RX ADMIN — AZITHROMYCIN 255 MILLIGRAM(S): 500 TABLET, FILM COATED ORAL at 17:40

## 2019-09-09 RX ADMIN — Medication 360 MILLIGRAM(S): at 05:54

## 2019-09-09 RX ADMIN — Medication 3 MILLILITER(S): at 08:43

## 2019-09-09 RX ADMIN — CHLORHEXIDINE GLUCONATE 1 APPLICATION(S): 213 SOLUTION TOPICAL at 05:54

## 2019-09-09 RX ADMIN — Medication 40 MILLIGRAM(S): at 17:39

## 2019-09-09 RX ADMIN — Medication 5 MILLIGRAM(S): at 05:54

## 2019-09-09 RX ADMIN — CEFTRIAXONE 100 MILLIGRAM(S): 500 INJECTION, POWDER, FOR SOLUTION INTRAMUSCULAR; INTRAVENOUS at 23:31

## 2019-09-09 RX ADMIN — Medication 60 MILLIGRAM(S): at 05:54

## 2019-09-09 RX ADMIN — Medication 0.25 MILLIGRAM(S): at 05:54

## 2019-09-09 RX ADMIN — WARFARIN SODIUM 4 MILLIGRAM(S): 2.5 TABLET ORAL at 22:37

## 2019-09-09 RX ADMIN — ATORVASTATIN CALCIUM 10 MILLIGRAM(S): 80 TABLET, FILM COATED ORAL at 22:37

## 2019-09-09 NOTE — CONSULT NOTE ADULT - SUBJECTIVE AND OBJECTIVE BOX
HPI:  79 year old female with a history of afib on coumadin, HTN, HLD, COPD (not on home 02) presents here for worsening sob for 5 days duration.    patient was doing well until 4 days prior to presentation when she started productive cough and worsening SOB, with no fever or chills, no sick contact,   Patient denies cp n/v abdominal pain, urinary or symptoms   in ed:  T(C): 36.8 (06 Sep 2019 20:00), Max: 36.8 (06 Sep 2019 20:00)  T(F): 98.2 (06 Sep 2019 20:00), Max: 98.2 (06 Sep 2019 20:00)  HR: 110 (06 Sep 2019 20:00) (101 - 120)  BP: 128/81 (06 Sep 2019 20:00) (128/81 - 203/87)  RR: 20 (06 Sep 2019 20:00) (20 - 22)  SpO2: 95% (06 Sep 2019 20:00) (89% - 95%)  CXR showed RLL opacity, with no leukocytosis, received Rocephin and azithromycin (06 Sep 2019 20:15)      PAST MEDICAL & SURGICAL HISTORY:  COPD without exacerbation  Afib      Hospital Course:    TODAY'S SUBJECTIVE & REVIEW OF SYMPTOMS:     Constitutional WNL   Cardio WNL   Resp sob   GI WNL  Heme WNL  Endo WNL  Skin WNL  MSK Weakness  Neuro WNL  Cognitive WNL  Psych WNL      MEDICATIONS  (STANDING):  aspirin  chewable 81 milliGRAM(s) Oral daily  atorvastatin 10 milliGRAM(s) Oral at bedtime  azithromycin  IVPB 500 milliGRAM(s) IV Intermittent every 24 hours  cefTRIAXone   IVPB 1000 milliGRAM(s) IV Intermittent every 24 hours  chlorhexidine 4% Liquid 1 Application(s) Topical <User Schedule>  digoxin     Tablet 0.25 milliGRAM(s) Oral daily  diltiazem    milliGRAM(s) Oral daily  enalapril 5 milliGRAM(s) Oral daily  furosemide    Tablet 40 milliGRAM(s) Oral daily  melatonin 5 milliGRAM(s) Oral at bedtime  pantoprazole    Tablet 40 milliGRAM(s) Oral before breakfast  predniSONE   Tablet 40 milliGRAM(s) Oral daily    MEDICATIONS  (PRN):  ALBUTerol/ipratropium for Nebulization 3 milliLiter(s) Nebulizer every 6 hours PRN Shortness of Breath and/or Wheezing  guaiFENesin    Syrup 100 milliGRAM(s) Oral every 6 hours PRN Cough  zolpidem 5 milliGRAM(s) Oral at bedtime PRN Insomnia      FAMILY HISTORY:  FH: HTN (hypertension)      Allergies    No Known Allergies    Intolerances        SOCIAL HISTORY:    [  ] Etoh  [  ] Smoking  [  ] Substance abuse     Home Environment:  [ x ] Home Alone  [  ] Lives with Family  [  ] Home Health Aid    Dwelling:  [  ] Apartment  [x  ] Private House  [  ] Adult Home  [  ] Skilled Nursing Facility      [  ] Short Term  [  ] Long Term  [ x ] Stairs       Elevator [  ]    FUNCTIONAL STATUS PTA: (Check all that apply)  Ambulation: [ x  ]Independent    [  ] Dependent     [  ] Non-Ambulatory  Assistive Device: [  ] SA Cane  [  ]  Q Cane  [  ] Walker  [  ]  Wheelchair  ADL : [x  ] Independent  [  ]  Dependent       Vital Signs Last 24 Hrs  T(C): 35.9 (09 Sep 2019 04:51), Max: 36.7 (08 Sep 2019 20:15)  T(F): 96.6 (09 Sep 2019 04:51), Max: 98 (08 Sep 2019 20:15)  HR: 83 (09 Sep 2019 04:51) (79 - 89)  BP: 139/65 (09 Sep 2019 04:51) (119/56 - 154/68)  BP(mean): --  RR: 96 (09 Sep 2019 08:25) (18 - 96)  SpO2: --      PHYSICAL EXAM: Alert & Oriented X3  GENERAL: NAD, well-groomed, well-developed  HEAD:  Atraumatic, Normocephalic  CHEST/LUNG: Clear   HEART: S1S2+  ABDOMEN: Soft, Nontender  EXTREMITIES:  no calf tenderness    NERVOUS SYSTEM:  Cranial Nerves 2-12 intact [  ] Abnormal  [  ]  ROM: WFL all extremities [x  ]  Abnormal [  ]  Motor Strength: WFL all extremities  [ x ]  Abnormal [  ]  Sensation: intact to light touch [x  ] Abnormal [  ]  Reflexes: Symmetric [  ]  Abnormal [  ]    FUNCTIONAL STATUS:  Bed Mobility: Independent [  ]  Supervision [x  ]  Needs Assistance [  ]  N/A [  ]  Transfers: Independent [  ]  Supervision [  ]  Needs Assistance [x  ]  N/A [  ]   Ambulation: Independent [  ]  Supervision [  ]  Needs Assistance [x  ]  N/A [  ]  ADL: Independent [  ] Requires Assistance [  ] N/A [  ]      LABS:                        10.8   11.50 )-----------( 306      ( 09 Sep 2019 05:36 )             34.8     09-09    134<L>  |  95<L>  |  36<H>  ----------------------------<  146<H>  5.1<H>   |  29  |  1.0    Ca    8.5      09 Sep 2019 05:36  Phos  5.1     09-09  Mg     2.2     09-09      PT/INR - ( 09 Sep 2019 05:36 )   PT: 31.30 sec;   INR: 2.75 ratio               RADIOLOGY & ADDITIONAL STUDIES:    Assesment:

## 2019-09-09 NOTE — SWALLOW BEDSIDE ASSESSMENT ADULT - SLP PERTINENT HISTORY OF CURRENT PROBLEM
79 year old female with a history of afib on coumadin, HTN, HLD, COPD (not on home 02) presents here for worsening sob for 5 days. Bilateral reticular nodular opacities, right greater than left.

## 2019-09-09 NOTE — DISCHARGE NOTE NURSING/CASE MANAGEMENT/SOCIAL WORK - NSDCPEEMAIL_GEN_ALL_CORE
[facility] for Tobacco Control email [email or web address]

## 2019-09-09 NOTE — DIETITIAN INITIAL EVALUATION ADULT. - NS FNS REASON FOR WEIGHT CHANG
pt. unsure of etiology of weight loss, reports UBW 120lbs in June- states current body weight documented might be inaccurate as she doesn't feel like she lost that much. 2 weights documented so fat 48.8kg dosing and 48.1kg

## 2019-09-09 NOTE — DISCHARGE NOTE NURSING/CASE MANAGEMENT/SOCIAL WORK - NSDCPEWEB_GEN_ALL_CORE
[facility] for Tobacco Control website --- [email or web address] --- [email or web address]

## 2019-09-09 NOTE — PROGRESS NOTE ADULT - SUBJECTIVE AND OBJECTIVE BOX
GENIE ROSENBERG  79y  Female      Patient is a 79y old  Female who presents with a chief complaint of SOB (09 Sep 2019 10:30)      INTERVAL HPI/OVERNIGHT EVENTS:  She still with cough, sputum, SOB, hypoxia with ambulation.   Vital Signs Last 24 Hrs  T(C): 35.9 (09 Sep 2019 04:51), Max: 36.7 (08 Sep 2019 20:15)  T(F): 96.6 (09 Sep 2019 04:51), Max: 98 (08 Sep 2019 20:15)  HR: 83 (09 Sep 2019 04:51) (79 - 89)  BP: 139/65 (09 Sep 2019 04:51) (119/56 - 154/68)  BP(mean): --  RR: 96 (09 Sep 2019 08:25) (18 - 96)  SpO2: --      09-08-19 @ 07:01  -  09-09-19 @ 07:00  --------------------------------------------------------  IN: 340 mL / OUT: 0 mL / NET: 340 mL            Consultant(s) Notes Reviewed:  [x ] YES  [ ] NO          MEDICATIONS  (STANDING):  aspirin  chewable 81 milliGRAM(s) Oral daily  atorvastatin 10 milliGRAM(s) Oral at bedtime  azithromycin  IVPB 500 milliGRAM(s) IV Intermittent every 24 hours  cefTRIAXone   IVPB 1000 milliGRAM(s) IV Intermittent every 24 hours  chlorhexidine 4% Liquid 1 Application(s) Topical <User Schedule>  digoxin     Tablet 0.25 milliGRAM(s) Oral daily  diltiazem    milliGRAM(s) Oral daily  enalapril 5 milliGRAM(s) Oral daily  furosemide    Tablet 40 milliGRAM(s) Oral daily  melatonin 5 milliGRAM(s) Oral at bedtime  pantoprazole    Tablet 40 milliGRAM(s) Oral before breakfast  predniSONE   Tablet 40 milliGRAM(s) Oral daily    MEDICATIONS  (PRN):  ALBUTerol/ipratropium for Nebulization 3 milliLiter(s) Nebulizer every 6 hours PRN Shortness of Breath and/or Wheezing  guaiFENesin    Syrup 100 milliGRAM(s) Oral every 6 hours PRN Cough  zolpidem 5 milliGRAM(s) Oral at bedtime PRN Insomnia      LABS                          10.8   11.50 )-----------( 306      ( 09 Sep 2019 05:36 )             34.8     09-09    134<L>  |  95<L>  |  36<H>  ----------------------------<  146<H>  5.1<H>   |  29  |  1.0    Ca    8.5      09 Sep 2019 05:36  Phos  5.1     09-09  Mg     2.2     09-09          PT/INR - ( 09 Sep 2019 05:36 )   PT: 31.30 sec;   INR: 2.75 ratio           Lactate Trend  09-06 @ 18:00 Lactate:0.8         CAPILLARY BLOOD GLUCOSE          Culture - Blood (collected 09-07-19 @ 05:18)  Source: .Blood None  Preliminary Report (09-08-19 @ 18:01):    No growth to date.        RADIOLOGY & ADDITIONAL TESTS:    Imaging Personally Reviewed:  [ ] YES  [ ] NO    HEALTH ISSUES - PROBLEM Dx:        PHYSICAL EXAM:  GENERAL: NAD, well-developed  HEAD:  Atraumatic, Normocephalic  EYES: EOMI, PERRLA, conjunctiva and sclera clear  NECK: Supple, No JVD  CHEST/LUNG: right lower lung rales, prolonged expiration.   HEART: Irregular rate and rhythm; S1 S2  ABDOMEN: Soft, Nontender, Nondistended; Bowel sounds present  EXTREMITIES:  2+ Peripheral Pulses, No clubbing, cyanosis, or edema  PSYCH: AAOx3  NEUROLOGY: non-focal  SKIN: No rashes or lesions

## 2019-09-09 NOTE — DIETITIAN INITIAL EVALUATION ADULT. - FACTORS AFF FOOD INTAKE
reports some difficulty swallowing at times- s/p SLP eval- rec regular with thins, unspecified "GI issues", last BM 9/8

## 2019-09-09 NOTE — DIETITIAN INITIAL EVALUATION ADULT. - PHYSICAL APPEARANCE
BMI 16.9, alert&oriented, skin: ecchymosis (BS 20), unable to perform full physcial exam as pt. did not give consent, no obvious physical signs of malnutrition observed during visit. Will attempt full assessment at follow up./underweight

## 2019-09-09 NOTE — DIETITIAN INITIAL EVALUATION ADULT. - ENERGY NEEDS
calorie 1201-1501kcal (MSJ x 1.2-1.5 AF)  protein 54-63g (1.1-1.3g/kg CBW) as above  fluid 1ml/kcal or per LIP

## 2019-09-09 NOTE — DISCHARGE NOTE NURSING/CASE MANAGEMENT/SOCIAL WORK - PATIENT PORTAL LINK FT
You can access the FollowMyHealth Patient Portal offered by [facility] by registering at the following website: [email or web address]. By joining [employer]’s FollowMyHealth portal, you will also be able to view your health information using other applications (apps) compatible with our system.

## 2019-09-09 NOTE — CONSULT NOTE ADULT - ASSESSMENT

## 2019-09-09 NOTE — PROGRESS NOTE ADULT - ASSESSMENT
79 year old female with a history of afib on coumadin, htn, hld, copd (not on home 02) presents here for worsening sob for the last several days.    # COPD exacerbation? RLL Pneumonia vs fluid overload   - Continue IV solumedrol 60 BID  - nebs q6   - Continue ceftriaxone and azithromycin   - Speech and Swallow for possible aspiration   - ambulation o2 sat  - Continue Lasix as BNP is elevated, check echo  - keep I<O  - AM CXR pending    # Abnormal EKG. No CP  - check echo  - outpt f/u with Dr. Hairston    # afib rate controlled   - Continue Cardizem 360 digoxin and coumadin 5 QD  - dig level 1.0    # Insomnia  - patient states that only ambient wrks but PMD refuses to prescribe in light of COPD and CO2 retension which is reasonable  -outpt sleep specialist referral       # HTN  - Continue enalapril    # DVT ppx on AC  # DASH diet   # FULL code   Dispo as per PT 79 year old female with a history of afib on coumadin, htn, hld, copd (not on home 02) presents here for worsening sob for the last several days.    Patients morning Xray looks better and she is clinically much better. Patients Nebs were switched to PRN and Steroids changed to oral.    # COPD exacerbation? RLL Pneumonia vs fluid overload   - D/C IV solumedrol 60 twice daily and switch to Prednisone 40mg daily  - nebs q6 PRN   - Continue ceftriaxone and azithromycin   - Speech and Swallow recommend Regular, thins diet  - Patient still on 2L of oxygen and will try to wean off the patient today.  - Tried weaning off oxygen yesterday and desaturating to 88%  - Continue Lasix 40mg daily BNP is elevated, Echo pending  - keep I<O  - AM CXR shows improvement    # Abnormal EKG. No CP  - check echo  - outpt f/u with Dr. Hairston    # Afib rate controlled   - Continue Cardizem 360 digoxin and coumadin 5 QD  - dig level 1.0    # Insomnia  - patient states that only ambient wrks but PMD refuses to prescribe in light of COPD and CO2 retention which is reasonable  - outpatient sleep specialist referral   - Patient slept well last night    # HTN  - Continue enalapril    # DVT ppx on AC  # DASH diet   # FULL code   Dispo as per PT 79 year old female with a history of afib on coumadin, htn, hld, copd (not on home 02) presents here for worsening sob for the last several days.    Patients morning Xray looks better and she is clinically much better. Patients Nebs were switched to PRN and Steroids changed to oral.    # COPD exacerbation? RLL Pneumonia vs fluid overload   - Continue IV solumedrol 60 twice daily  - nebs q6 PRN   - Continue ceftriaxone and azithromycin   - Speech and Swallow recommend Regular, thins diet  - Patient still on 2L of oxygen and will try to wean off the patient today.  - Tried weaning off oxygen yesterday and desaturating to 88%  - Continue Lasix 40mg daily BNP is elevated, Echo pending  - keep I<O  - AM CXR shows improvement    # Abnormal EKG. No CP  - check echo  - outpt f/u with Dr. Hairston    # Afib rate controlled   - Continue Cardizem 360 digoxin and coumadin 5 QD  - dig level 1.0    # Insomnia  - patient states that only ambient wrks but PMD refuses to prescribe in light of COPD and CO2 retention which is reasonable  - outpatient sleep specialist referral   - Patient slept well last night    # HTN  - Continue enalapril    # DVT ppx on AC  # DASH diet   # FULL code   Dispo as per PT

## 2019-09-09 NOTE — DIETITIAN INITIAL EVALUATION ADULT. - NUTRITIONGOAL OUTCOME1
In 3 days pt. to continue to consume % PO + supplement In 3 days pt. to continue to consume % PO + supplement, weight maintenance within 1-2kg

## 2019-09-09 NOTE — DIETITIAN INITIAL EVALUATION ADULT. - DIET TYPE
Per pt. fair appetite PTP up until 3 days PTP. Pt. reports appetite decreased significantly. Pt. reports GI "issues", but would not discuss with RD today. Pt. states she's planning in seeing GI doctor. Ensure supplement daily for the past 6 months. Pt. endorses weight loss, reports UBW 120lbs. NKFA. Currently, appetite at baseline- observed >75% PO consumed at breakfast. Per pt. fair appetite PTP up until 3 days PTP. Pt. reports appetite decreased significantly. Pt. reports GI "issues", but would not discuss with RD today. Pt. states she's planning on seeing GI doctor. Ensure supplement daily for the past 6 months. Pt. endorses weight loss, reports UBW 120lbs. NKFA. Currently, appetite at baseline- observed >75% PO consumed at breakfast.

## 2019-09-09 NOTE — PROGRESS NOTE ADULT - ASSESSMENT
A 80 yo female with PMH of HTN, COPD, AFib on Coumadin was admitted to the hospital for acute SIB and Hypoxia. Patient found with acute COPD exacerbation.    A/P:   Acute COPD exacerbation ;  Right lower lobe pneumonia, likely gram negative. Sepsis ruled out on admission.   CXR showed right lower lung infiltrates   Check legionella, Strep in urine and mycoplasma. Blood Cx no growth.   Continue Solu-Medrol 60mg BID, Duoneb.  Continue Ceftriaxone and Zithromax.   Patient was hypoxic on ambulation today 86%, likely she needs home O2.     Chronic Atrial fibrillation:   Continue Digoxin, Cardizem and Coumadin. INR 2.7 therapeutic.   Pending echo for elevated Pro-BNP      HTN  Continue enalapril    Abnormal EKG:   EKG showed diffuse TWI on anterior and lateral leads.     #Progress Note Handoff:  Pending (specify):  Echo, cardiology, improving COPD  Family discussion: with patient, she will need to stay in the hospital for possible two more days,  Disposition: Home with home with care. She will most likely need home O2.

## 2019-09-09 NOTE — PROGRESS NOTE ADULT - SUBJECTIVE AND OBJECTIVE BOX
SUBJECTIVE:    Patient is a 79y old Female who presents with a chief complaint of SOB (08 Sep 2019 16:55)    Currently admitted to medicine with the primary diagnosis of COPD exacerbation     Today is hospital day 3d. This morning she is resting comfortably in bed and reports no new issues or overnight events.     INTERVAL EVENTS: Patient complains of insomnia and SOB    PAST MEDICAL & SURGICAL HISTORY  COPD without exacerbation  Afib      ALLERGIES:  No Known Allergies    MEDICATIONS:  STANDING MEDICATIONS  ALBUTerol/ipratropium for Nebulization 3 milliLiter(s) Nebulizer every 6 hours  aspirin  chewable 81 milliGRAM(s) Oral daily  atorvastatin 10 milliGRAM(s) Oral at bedtime  azithromycin  IVPB 500 milliGRAM(s) IV Intermittent every 24 hours  cefTRIAXone   IVPB 1000 milliGRAM(s) IV Intermittent every 24 hours  chlorhexidine 4% Liquid 1 Application(s) Topical <User Schedule>  digoxin     Tablet 0.25 milliGRAM(s) Oral daily  diltiazem    milliGRAM(s) Oral daily  enalapril 5 milliGRAM(s) Oral daily  furosemide    Tablet 40 milliGRAM(s) Oral daily  melatonin 5 milliGRAM(s) Oral at bedtime  methylPREDNISolone sodium succinate Injectable 60 milliGRAM(s) IV Push every 12 hours  pantoprazole    Tablet 40 milliGRAM(s) Oral before breakfast    PRN MEDICATIONS  guaiFENesin    Syrup 100 milliGRAM(s) Oral every 6 hours PRN  zolpidem 5 milliGRAM(s) Oral at bedtime PRN    VITALS:   T(F): 96.6  HR: 83  BP: 139/65  RR: 18  SpO2: 94%    LABS:          PT/INR - ( 08 Sep 2019 05:06 )   PT: 32.30 sec;   INR: 2.84 ratio                   Culture - Blood (collected 07 Sep 2019 05:18)  Source: .Blood None  Preliminary Report (08 Sep 2019 18:01):    No growth to date.      CARDIAC MARKERS ( 07 Sep 2019 11:57 )  x     / <0.01 ng/mL / x     / x     / x          RADIOLOGY:    PHYSICAL EXAM:  GEN: No acute distress  PULM/CHEST: Decreased breath sounds bilaterally  CVS: Irregularly irregular  ABD: Soft, non-tender, non-distended, +BS  EXT: No edema  NEURO: AAOx3    Rawls Catheter: SUBJECTIVE:    Patient is a 79y old Female who presents with a chief complaint of SOB (08 Sep 2019 16:55)    Currently admitted to medicine with the primary diagnosis of COPD exacerbation     Today is hospital day 3d. This morning she is resting comfortably in bed and reports no new issues or overnight events.     INTERVAL EVENTS: Patient says she slept well over 6h but still upset with not getting Ambien.    PAST MEDICAL & SURGICAL HISTORY  COPD without exacerbation  Afib      ALLERGIES:  No Known Allergies    MEDICATIONS:  STANDING MEDICATIONS  ALBUTerol/ipratropium for Nebulization 3 milliLiter(s) Nebulizer every 6 hours  aspirin  chewable 81 milliGRAM(s) Oral daily  atorvastatin 10 milliGRAM(s) Oral at bedtime  azithromycin  IVPB 500 milliGRAM(s) IV Intermittent every 24 hours  cefTRIAXone   IVPB 1000 milliGRAM(s) IV Intermittent every 24 hours  chlorhexidine 4% Liquid 1 Application(s) Topical <User Schedule>  digoxin     Tablet 0.25 milliGRAM(s) Oral daily  diltiazem    milliGRAM(s) Oral daily  enalapril 5 milliGRAM(s) Oral daily  furosemide    Tablet 40 milliGRAM(s) Oral daily  melatonin 5 milliGRAM(s) Oral at bedtime  methylPREDNISolone sodium succinate Injectable 60 milliGRAM(s) IV Push every 12 hours  pantoprazole    Tablet 40 milliGRAM(s) Oral before breakfast    PRN MEDICATIONS  guaiFENesin    Syrup 100 milliGRAM(s) Oral every 6 hours PRN  zolpidem 5 milliGRAM(s) Oral at bedtime PRN    VITALS:   T(F): 96.6  HR: 83  BP: 139/65  RR: 18  SpO2: 94%    LABS:          PT/INR - ( 08 Sep 2019 05:06 )   PT: 32.30 sec;   INR: 2.84 ratio                   Culture - Blood (collected 07 Sep 2019 05:18)  Source: .Blood None  Preliminary Report (08 Sep 2019 18:01):    No growth to date.      CARDIAC MARKERS ( 07 Sep 2019 11:57 )  x     / <0.01 ng/mL / x     / x     / x          RADIOLOGY:    PHYSICAL EXAM:  GEN: No acute distress  PULM/CHEST: Decreased breath sounds bilaterally  CVS: Irregularly irregular  ABD: Soft, non-tender, non-distended, +BS  EXT: No edema  NEURO: AAOx3    Rawls Catheter: SUBJECTIVE:    Patient is a 79y old Female who presents with a chief complaint of SOB (08 Sep 2019 16:55)    Currently admitted to medicine with the primary diagnosis of COPD exacerbation     Today is hospital day 3d. This morning she is resting comfortably in bed and reports no new issues or overnight events.     INTERVAL EVENTS: Patient says she slept well over 6h but still upset with not getting Ambien.    PAST MEDICAL & SURGICAL HISTORY  COPD without exacerbation  Afib      ALLERGIES:  No Known Allergies    MEDICATIONS:  STANDING MEDICATIONS  ALBUTerol/ipratropium for Nebulization 3 milliLiter(s) Nebulizer every 6 hours  aspirin  chewable 81 milliGRAM(s) Oral daily  atorvastatin 10 milliGRAM(s) Oral at bedtime  azithromycin  IVPB 500 milliGRAM(s) IV Intermittent every 24 hours  cefTRIAXone   IVPB 1000 milliGRAM(s) IV Intermittent every 24 hours  chlorhexidine 4% Liquid 1 Application(s) Topical <User Schedule>  digoxin     Tablet 0.25 milliGRAM(s) Oral daily  diltiazem    milliGRAM(s) Oral daily  enalapril 5 milliGRAM(s) Oral daily  furosemide    Tablet 40 milliGRAM(s) Oral daily  melatonin 5 milliGRAM(s) Oral at bedtime  methylPREDNISolone sodium succinate Injectable 60 milliGRAM(s) IV Push every 12 hours  pantoprazole    Tablet 40 milliGRAM(s) Oral before breakfast    PRN MEDICATIONS  guaiFENesin    Syrup 100 milliGRAM(s) Oral every 6 hours PRN  zolpidem 5 milliGRAM(s) Oral at bedtime PRN    VITALS:   T(F): 96.6  HR: 83  BP: 139/65  RR: 18  SpO2: 94%    LABS:          PT/INR - ( 08 Sep 2019 05:06 )   PT: 32.30 sec;   INR: 2.84 ratio                   Culture - Blood (collected 07 Sep 2019 05:18)  Source: .Blood None  Preliminary Report (08 Sep 2019 18:01):    No growth to date.      CARDIAC MARKERS ( 07 Sep 2019 11:57 )  x     / <0.01 ng/mL / x     / x     / x          RADIOLOGY:    PHYSICAL EXAM:  GEN: No acute distress  PULM/CHEST: No wheezing heard, no rhonchi good air entry bilaterally  CVS: Irregularly irregular  ABD: Soft, non-tender, non-distended, +BS  EXT: No edema  NEURO: AAOx3

## 2019-09-09 NOTE — DIETITIAN INITIAL EVALUATION ADULT. - OTHER INFO
P/w: SOB.  COPD exacerbation? RLL Pneumonia vs fluid overload.  Abnormal EKG. No CP.  Afib rate controlled. Insomnia.

## 2019-09-09 NOTE — DIETITIAN INITIAL EVALUATION ADULT. - PERTINENT LABORATORY DATA
(9/9) WBC 11.50, RBC 4.02, Hg 10.8, Hct 34.8, Na 134, K 5.1, Cl 95, BUN 36, glu 146, eGFR 54, Phos 5.1

## 2019-09-10 ENCOUNTER — APPOINTMENT (OUTPATIENT)
Dept: CARDIOLOGY | Facility: CLINIC | Age: 79
End: 2019-09-10

## 2019-09-10 LAB
ALBUMIN SERPL ELPH-MCNC: 3.8 G/DL — SIGNIFICANT CHANGE UP (ref 3.5–5.2)
ALP SERPL-CCNC: 83 U/L — SIGNIFICANT CHANGE UP (ref 30–115)
ALT FLD-CCNC: 26 U/L — SIGNIFICANT CHANGE UP (ref 0–41)
ANION GAP SERPL CALC-SCNC: 8 MMOL/L — SIGNIFICANT CHANGE UP (ref 7–14)
APPEARANCE UR: CLEAR — SIGNIFICANT CHANGE UP
APTT BLD: 33.8 SEC — SIGNIFICANT CHANGE UP (ref 27–39.2)
AST SERPL-CCNC: 14 U/L — SIGNIFICANT CHANGE UP (ref 0–41)
BILIRUB SERPL-MCNC: 0.2 MG/DL — SIGNIFICANT CHANGE UP (ref 0.2–1.2)
BILIRUB UR-MCNC: NEGATIVE — SIGNIFICANT CHANGE UP
BUN SERPL-MCNC: 35 MG/DL — HIGH (ref 10–20)
CALCIUM SERPL-MCNC: 8.8 MG/DL — SIGNIFICANT CHANGE UP (ref 8.5–10.1)
CHLORIDE SERPL-SCNC: 95 MMOL/L — LOW (ref 98–110)
CO2 SERPL-SCNC: 33 MMOL/L — HIGH (ref 17–32)
COLOR SPEC: SIGNIFICANT CHANGE UP
CREAT SERPL-MCNC: 0.8 MG/DL — SIGNIFICANT CHANGE UP (ref 0.7–1.5)
DIFF PNL FLD: NEGATIVE — SIGNIFICANT CHANGE UP
GLUCOSE SERPL-MCNC: 142 MG/DL — HIGH (ref 70–99)
GLUCOSE UR QL: NEGATIVE — SIGNIFICANT CHANGE UP
HCT VFR BLD CALC: 38.7 % — SIGNIFICANT CHANGE UP (ref 37–47)
HGB BLD-MCNC: 11.6 G/DL — LOW (ref 12–16)
INR BLD: 2.35 RATIO — HIGH (ref 0.65–1.3)
KETONES UR-MCNC: NEGATIVE — SIGNIFICANT CHANGE UP
LEUKOCYTE ESTERASE UR-ACNC: NEGATIVE — SIGNIFICANT CHANGE UP
MAGNESIUM SERPL-MCNC: 2.4 MG/DL — SIGNIFICANT CHANGE UP (ref 1.8–2.4)
MCHC RBC-ENTMCNC: 26.4 PG — LOW (ref 27–31)
MCHC RBC-ENTMCNC: 30 G/DL — LOW (ref 32–37)
MCV RBC AUTO: 88.2 FL — SIGNIFICANT CHANGE UP (ref 81–99)
NITRITE UR-MCNC: NEGATIVE — SIGNIFICANT CHANGE UP
NRBC # BLD: 0 /100 WBCS — SIGNIFICANT CHANGE UP (ref 0–0)
PH UR: 6 — SIGNIFICANT CHANGE UP (ref 5–8)
PLATELET # BLD AUTO: 343 K/UL — SIGNIFICANT CHANGE UP (ref 130–400)
POTASSIUM SERPL-MCNC: 5.5 MMOL/L — HIGH (ref 3.5–5)
POTASSIUM SERPL-SCNC: 5.5 MMOL/L — HIGH (ref 3.5–5)
PROT SERPL-MCNC: 6.3 G/DL — SIGNIFICANT CHANGE UP (ref 6–8)
PROT UR-MCNC: SIGNIFICANT CHANGE UP
PROTHROM AB SERPL-ACNC: 26.8 SEC — HIGH (ref 9.95–12.87)
RBC # BLD: 4.39 M/UL — SIGNIFICANT CHANGE UP (ref 4.2–5.4)
RBC # FLD: 15.1 % — HIGH (ref 11.5–14.5)
SODIUM SERPL-SCNC: 136 MMOL/L — SIGNIFICANT CHANGE UP (ref 135–146)
SP GR SPEC: 1.02 — SIGNIFICANT CHANGE UP (ref 1.01–1.02)
UROBILINOGEN FLD QL: SIGNIFICANT CHANGE UP
WBC # BLD: 10.32 K/UL — SIGNIFICANT CHANGE UP (ref 4.8–10.8)
WBC # FLD AUTO: 10.32 K/UL — SIGNIFICANT CHANGE UP (ref 4.8–10.8)

## 2019-09-10 PROCEDURE — 99233 SBSQ HOSP IP/OBS HIGH 50: CPT

## 2019-09-10 RX ORDER — SODIUM POLYSTYRENE SULFONATE 4.1 MEQ/G
30 POWDER, FOR SUSPENSION ORAL ONCE
Refills: 0 | Status: DISCONTINUED | OUTPATIENT
Start: 2019-09-10 | End: 2019-09-10

## 2019-09-10 RX ORDER — REGADENOSON 0.08 MG/ML
0.4 INJECTION, SOLUTION INTRAVENOUS ONCE
Refills: 0 | Status: COMPLETED | OUTPATIENT
Start: 2019-09-10 | End: 2019-09-11

## 2019-09-10 RX ORDER — WARFARIN SODIUM 2.5 MG/1
4 TABLET ORAL DAILY
Refills: 0 | Status: DISCONTINUED | OUTPATIENT
Start: 2019-09-10 | End: 2019-09-11

## 2019-09-10 RX ORDER — LABETALOL HCL 100 MG
10 TABLET ORAL ONCE
Refills: 0 | Status: COMPLETED | OUTPATIENT
Start: 2019-09-10 | End: 2019-09-10

## 2019-09-10 RX ADMIN — ZOLPIDEM TARTRATE 5 MILLIGRAM(S): 10 TABLET ORAL at 23:33

## 2019-09-10 RX ADMIN — Medication 40 MILLIGRAM(S): at 05:37

## 2019-09-10 RX ADMIN — Medication 5 MILLIGRAM(S): at 05:37

## 2019-09-10 RX ADMIN — PANTOPRAZOLE SODIUM 40 MILLIGRAM(S): 20 TABLET, DELAYED RELEASE ORAL at 06:20

## 2019-09-10 RX ADMIN — ZOLPIDEM TARTRATE 5 MILLIGRAM(S): 10 TABLET ORAL at 00:18

## 2019-09-10 RX ADMIN — CHLORHEXIDINE GLUCONATE 1 APPLICATION(S): 213 SOLUTION TOPICAL at 05:38

## 2019-09-10 RX ADMIN — AZITHROMYCIN 255 MILLIGRAM(S): 500 TABLET, FILM COATED ORAL at 17:49

## 2019-09-10 RX ADMIN — Medication 40 MILLIGRAM(S): at 17:50

## 2019-09-10 RX ADMIN — Medication 0.25 MILLIGRAM(S): at 05:37

## 2019-09-10 RX ADMIN — CEFTRIAXONE 100 MILLIGRAM(S): 500 INJECTION, POWDER, FOR SOLUTION INTRAMUSCULAR; INTRAVENOUS at 23:34

## 2019-09-10 RX ADMIN — Medication 81 MILLIGRAM(S): at 12:27

## 2019-09-10 RX ADMIN — WARFARIN SODIUM 4 MILLIGRAM(S): 2.5 TABLET ORAL at 21:57

## 2019-09-10 RX ADMIN — Medication 360 MILLIGRAM(S): at 05:37

## 2019-09-10 RX ADMIN — Medication 10 MILLIGRAM(S): at 22:48

## 2019-09-10 RX ADMIN — ATORVASTATIN CALCIUM 10 MILLIGRAM(S): 80 TABLET, FILM COATED ORAL at 21:57

## 2019-09-10 NOTE — PROGRESS NOTE ADULT - SUBJECTIVE AND OBJECTIVE BOX
GENIE ROSENBERG  79y  Female      Patient is a 79y old  Female who presents with a chief complaint of SOB (10 Sep 2019 06:39)      INTERVAL HPI/OVERNIGHT EVENTS:  Still with SOB and cough, no fever.   Vital Signs Last 24 Hrs  T(C): 35.9 (10 Sep 2019 12:45), Max: 36.6 (09 Sep 2019 14:18)  T(F): 96.6 (10 Sep 2019 12:45), Max: 97.8 (09 Sep 2019 14:18)  HR: 91 (10 Sep 2019 12:45) (72 - 91)  BP: 162/76 (10 Sep 2019 12:45) (144/62 - 176/70)  BP(mean): --  RR: 20 (10 Sep 2019 12:45) (18 - 20)  SpO2: 86% (09 Sep 2019 23:15) (86% - 96%)      09-09-19 @ 07:01  -  09-10-19 @ 07:00  --------------------------------------------------------  IN: 50 mL / OUT: 0 mL / NET: 50 mL            Consultant(s) Notes Reviewed:  [x ] YES  [ ] NO          MEDICATIONS  (STANDING):  aspirin  chewable 81 milliGRAM(s) Oral daily  atorvastatin 10 milliGRAM(s) Oral at bedtime  azithromycin  IVPB 500 milliGRAM(s) IV Intermittent every 24 hours  cefTRIAXone   IVPB 1000 milliGRAM(s) IV Intermittent every 24 hours  chlorhexidine 4% Liquid 1 Application(s) Topical <User Schedule>  digoxin     Tablet 0.25 milliGRAM(s) Oral daily  diltiazem    milliGRAM(s) Oral daily  enalapril 5 milliGRAM(s) Oral daily  furosemide    Tablet 40 milliGRAM(s) Oral daily  melatonin 5 milliGRAM(s) Oral at bedtime  methylPREDNISolone sodium succinate Injectable 40 milliGRAM(s) IV Push every 12 hours  pantoprazole    Tablet 40 milliGRAM(s) Oral before breakfast    MEDICATIONS  (PRN):  ALBUTerol/ipratropium for Nebulization 3 milliLiter(s) Nebulizer every 6 hours PRN Shortness of Breath and/or Wheezing  guaiFENesin    Syrup 100 milliGRAM(s) Oral every 6 hours PRN Cough  zolpidem 5 milliGRAM(s) Oral at bedtime PRN Insomnia      LABS                          11.6   10.32 )-----------( 343      ( 10 Sep 2019 05:06 )             38.7     09-10    136  |  95<L>  |  35<H>  ----------------------------<  142<H>  5.5<H>   |  33<H>  |  0.8    Ca    8.8      10 Sep 2019 05:06  Phos  5.1     09-09  Mg     2.4     09-10    TPro  6.3  /  Alb  3.8  /  TBili  0.2  /  DBili  x   /  AST  14  /  ALT  26  /  AlkPhos  83  09-10        PT/INR - ( 10 Sep 2019 05:06 )   PT: 26.80 sec;   INR: 2.35 ratio         PTT - ( 10 Sep 2019 05:06 )  PTT:33.8 sec  Lactate Trend  09-06 @ 18:00 Lactate:0.8         CAPILLARY BLOOD GLUCOSE          Culture - Blood (collected 09-07-19 @ 05:18)  Source: .Blood None  Preliminary Report (09-08-19 @ 18:01):    No growth to date.        RADIOLOGY & ADDITIONAL TESTS:    Imaging Personally Reviewed:  [ ] YES  [ ] NO    HEALTH ISSUES - PROBLEM Dx:        PHYSICAL EXAM:  GENERAL: NAD, well-developed  HEAD:  Atraumatic, Normocephalic  EYES: EOMI, PERRLA, conjunctiva and sclera clear  NECK: Supple, No JVD  CHEST/LUNG: right lower lung rales, prolonged expiration.   HEART: Irregular rate and rhythm; S1 S2  ABDOMEN: Soft, Nontender, Nondistended; Bowel sounds present  EXTREMITIES:  2+ Peripheral Pulses, No clubbing, cyanosis, or edema  PSYCH: AAOx3  NEUROLOGY: non-focal  SKIN: No rashes or lesions

## 2019-09-10 NOTE — PROGRESS NOTE ADULT - ASSESSMENT
A 80 yo female with PMH of HTN, COPD, AFib on Coumadin was admitted to the hospital for acute SIB and Hypoxia. Patient found with acute COPD exacerbation.    A/P:   Acute COPD exacerbation ;  Right lower lobe pneumonia, likely gram negative. Sepsis ruled out on admission.   CXR showed right lower lung infiltrates   Check legionella, Strep in urine and mycoplasma. Blood Cx no growth.   Continue Solu-Medrol 40mg BID for another day, may switch to PO prednisone tomorrow, continue Duoneb.  Continue Ceftriaxone and Zithromax.   Patient was hypoxic on ambulation today 86%, likely she needs home O2.     Chronic Atrial fibrillation:   Continue Digoxin, Cardizem and Coumadin. INR 2.3 therapeutic.   Echo showed normal lVEF 50-55%, mild to mod MR      HTN  Continue enalapril    Abnormal EKG:   EKG showed diffuse TWI on anterior and lateral leads.   patient has no chest pain, repeat EKG    #Progress Note Handoff:  Pending (specify): improving COPD  Family discussion: with patient, she will need to stay in the hospital for possible two more days,  Disposition: Home with home with care. She will most likely need home O2. Possible discharge on Thursday A 80 yo female with PMH of HTN, COPD, AFib on Coumadin was admitted to the hospital for acute SIB and Hypoxia. Patient found with acute COPD exacerbation.    A/P:   Acute COPD exacerbation ;  Right lower lobe pneumonia, likely gram negative. Sepsis ruled out on admission.   CXR showed right lower lung infiltrates   Check legionella, Strep in urine and mycoplasma. Blood Cx no growth.   Continue Solu-Medrol 40mg BID for another day, may switch to PO prednisone tomorrow, continue Duoneb.  Continue Ceftriaxone and Zithromax.   Patient was hypoxic on ambulation today 86%, likely she needs home O2.     Chronic Atrial fibrillation:   Continue Digoxin, Cardizem and Coumadin. INR 2.3 therapeutic.   Echo showed normal lVEF 50-55%, mild to mod MR      HTN  Continue enalapril    Abnormal EKG:   EKG showed diffuse TWI on anterior and lateral leads.   patient has no chest pain, repeat EKG  On telemetry she had less than 3 second pause, also run of NS vtach for 3 seconds.   Order nuclear stress test.     #Progress Note Handoff:  Pending (specify): improving COPD  Family discussion: with patient, she will need to stay in the hospital until symptoms are better.   Disposition: Home with home with care. She will most likely need home O2. Possible discharge on Thursday

## 2019-09-10 NOTE — GOALS OF CARE CONVERSATION - PERSONAL ADVANCE DIRECTIVE - CONVERSATION DETAILS
I discussed her prognosis of COPD, she is now on home o2, she might get more frequent exacerbation.   I advised the patient to discuss her advanced directive with her family, also to have a health care proxy for her

## 2019-09-10 NOTE — PROGRESS NOTE ADULT - ASSESSMENT
79 year old female with a history of afib on coumadin, htn, hld, copd (not on home 02) presents here for worsening sob for the last several days.    Patients morning Xray looks better and she is clinically much better. Patients Nebs were switched to PRN and Steroids changed to oral.    # COPD exacerbation? RLL Pneumonia vs fluid overload   - Continue IV solumedrol 60 twice daily  - nebs q6 PRN   - Continue ceftriaxone and azithromycin   - Speech and Swallow recommend Regular, thins diet  - Patient still on 2L of oxygen and will try to wean off the patient today.  - Tried weaning off oxygen yesterday and desaturating to 80s, will probably need oxygen  - Continue Lasix 40mg daily BNP is elevated, EF of 50-55% on echo  - keep I<O  - XRay Chest yesterday shows improvement    # Abnormal EKG. No CP  - T wave inversions seen  - Mild to moderate MR on echo and EF of 50-55%  - outpt f/u with Dr. Hairston    # Afib rate controlled   - Continue Cardizem 360 digoxin and coumadin 5 QD  - dig level 1.0    # Insomnia  - patient states that only ambient wrks but PMD refuses to prescribe in light of COPD and CO2 retention which is reasonable  - outpatient sleep specialist referral   - Patient slept well last night    # HTN  - Continue enalapril    # DVT ppx on AC  # DASH diet   # FULL code   Dispo as per PT 79 year old female with a history of afib on coumadin, htn, hld, copd (not on home 02) presents here for worsening sob for the last several days.    Patients morning Xray looks better and she is clinically much better. Patients Nebs were switched to PRN and Steroids changed to oral.    # COPD exacerbation? RLL Pneumonia vs fluid overload   - Continue IV solumedrol 60 twice daily  - nebs q6 PRN   - Continue ceftriaxone and azithromycin   - Speech and Swallow recommend Regular, thins diet  - Patient still on 2L of oxygen and will try to wean off the patient today.  - Tried weaning off oxygen yesterday and desaturating to 80s, will probably need oxygen  - Continue Lasix 40mg daily BNP is elevated, EF of 50-55% on echo  - keep I<O  - XRay Chest yesterday shows improvement    # Abnormal EKG. No CP  - T wave inversions seen  - Mild to moderate MR on echo and EF of 50-55%  - outpt f/u with Dr. Hairston    # Afib rate controlled   - Continue Cardizem 360 digoxin and coumadin 5 QD  - dig level 1.0    # Insomnia  - patient states that only ambient wrks but PMD refuses to prescribe in light of COPD and CO2 retention which is reasonable  - outpatient sleep specialist referral   - Patient slept well last night    # HTN  - Continue enalapril    # Hyperkalemia  - K of 5.5 today  - 30g Kayexalate given once    # DVT ppx on AC  # DASH diet   # FULL code   Dispo as per PT 79 year old female with a history of afib on coumadin, htn, hld, copd (not on home 02) presents here for worsening sob for the last several days.    Patients morning Xray looks better and she is clinically much better. Patients Nebs were switched to PRN and Steroids changed to oral.    # COPD exacerbation? RLL Pneumonia vs fluid overload   - Continue IV solumedrol 60 twice daily  - nebs q6 PRN   - Continue ceftriaxone and azithromycin   - Speech and Swallow recommend Regular, thins diet  - Patient still on 2L of oxygen and will try to wean off the patient today.  - Tried weaning off oxygen yesterday and desaturating to 80s, will probably need oxygen  - Continue Lasix 40mg daily BNP is elevated, EF of 50-55% on echo  - keep I<O  - XRay Chest yesterday shows improvement    # Abnormal EKG. No CP  - T wave inversions seen  - Mild to moderate MR on echo and EF of 50-55%  - outpt f/u with Dr. Hairston    # Afib rate controlled   - Continue Cardizem 360 digoxin and coumadin 5 QD  - dig level 1.0    # Insomnia  - patient states that only ambient wrks but PMD refuses to prescribe in light of COPD and CO2 retention which is reasonable  - outpatient sleep specialist referral   - Patient slept well last night    # HTN  - Continue enalapril    # Hyperkalemia  - K of 5.5 today  - Put on K restricted diet    # DVT ppx on AC  # DASH diet   # FULL code   Dispo as per PT 79 year old female with a history of afib on coumadin, htn, hld, copd (not on home 02) presents here for worsening sob for the last several days.    Patients morning Xray looks better and she is clinically much better. Patients Nebs were switched to PRN and IV steroids for one more day. Possible switch to oral tomorrow.    # COPD exacerbation? RLL Pneumonia vs fluid overload   - Continue IV solumedrol 60 twice daily  - nebs q6 PRN   - Continue ceftriaxone and azithromycin   - Speech and Swallow recommend Regular, thins diet  - Patient still on 2L of oxygen and likely will need home oxygen  - Tried weaning off oxygen yesterday and desaturating to 80s  - Continue Lasix 40mg daily BNP is elevated, EF of 50-55% on echo  - keep I<O  - XRay Chest yesterday shows improvement  - Urine Strep and Legionella sent, Mycoplasma antibody titers sent    # Abnormal EKG. No CP  - T wave inversions seen  - Mild to moderate MR on echo and EF of 50-55%  - outpt f/u with Dr. Hairston    # Afib rate controlled   - Continue Cardizem 360 digoxin and coumadin 5 QD  - dig level 1.0    # Insomnia  - patient states that only ambient wrks but PMD refuses to prescribe in light of COPD and CO2 retention which is reasonable  - outpatient sleep specialist referral   - Patient slept well last night    # HTN  - Continue enalapril    # Hyperkalemia  - K of 5.5 today  - Put on K restricted diet    # DVT ppx on AC  # DASH diet   # FULL code   Dispo as per PT 79 year old female with a history of afib on coumadin, htn, hld, copd (not on home 02) presents here for worsening sob for the last several days.    Patients morning Xray looks better and she is clinically much better. Patients Nebs were switched to PRN and IV steroids for one more day. Possible switch to oral tomorrow.    Patient oxygen saturation is 92% on room air at rest. Patient has COPD and therefore needs home oxygen. Patient is aware that she will be going home on oxygen. Patient was tested in a chronic stable state. Despite IV Lasix, Steroid and Antibiotic therapy patients oxygen sat on ambulation is 86%. Patient oxygen is 96% on 2 LPM via nasal cannula.    # COPD exacerbation? RLL Pneumonia vs fluid overload   - Continue IV solumedrol 60 twice daily  - nebs q6 PRN   - Continue ceftriaxone and azithromycin   - Speech and Swallow recommend Regular, thins diet  - Patient still on 2L of oxygen and likely will need home oxygen  - Tried weaning off oxygen yesterday and desaturating to 80s  - Continue Lasix 40mg daily BNP is elevated, EF of 50-55% on echo  - keep I<O  - XRay Chest yesterday shows improvement  - Urine Strep and Legionella sent, Mycoplasma antibody titers sent    # Abnormal EKG. No CP  - T wave inversions seen  - Mild to moderate MR on echo and EF of 50-55%  - outpt f/u with Dr. Hairston    # Afib rate controlled   - Continue Cardizem 360 digoxin and coumadin 5 QD  - dig level 1.0    # Insomnia  - patient states that only ambient wrks but PMD refuses to prescribe in light of COPD and CO2 retention which is reasonable  - outpatient sleep specialist referral   - Patient slept well last night    # HTN  - Continue enalapril    # Hyperkalemia  - K of 5.5 today  - Put on K restricted diet    # DVT ppx on AC  # DASH diet   # FULL code   Dispo as per PT 79 year old female with a history of afib on coumadin, htn, hld, copd (not on home 02) presents here for worsening sob for the last several days.    Patients morning Xray looks better and she is clinically much better. Patients Nebs were switched to PRN and IV steroids for one more day. Possible switch to oral tomorrow.    Patient oxygen saturation is 92% on room air at rest. Patient has COPD and therefore needs home oxygen. Patient is aware that she will be going home on oxygen. Patient was tested in a chronic stable state. Despite IV Lasix, Steroid and Antibiotic therapy patients oxygen sat on ambulation is 86%. Patient oxygen is 96% on 2 LPM via nasal cannula.    # COPD exacerbation? RLL Pneumonia vs fluid overload   - Continue IV solumedrol 60 twice daily  - nebs q6 PRN   - Continue ceftriaxone and azithromycin   - Speech and Swallow recommend Regular, thins diet  - Patient still on 2L of oxygen and likely will need home oxygen  - Tried weaning off oxygen yesterday and desaturating to 80s  - Continue Lasix 40mg daily BNP is elevated, EF of 50-55% on echo  - keep I<O  - XRay Chest yesterday shows improvement  - Urine Strep and Legionella sent, Mycoplasma antibody titers sent    # Abnormal EKG. No CP  - T wave inversions seen  - Mild to moderate MR on echo and EF of 50-55%  - Nuclear Stress test ordered  - outpt f/u with Dr. Hairston    # Afib rate controlled   - Continue Cardizem 360 digoxin and coumadin 5 QD  - dig level 1.0    # Insomnia  - patient states that only ambient wrks but PMD refuses to prescribe in light of COPD and CO2 retention which is reasonable  - outpatient sleep specialist referral   - Patient slept well last night    # HTN  - Continue enalapril    # Hyperkalemia  - K of 5.5 today  - Put on K restricted diet    # DVT ppx on AC  # DASH diet   # FULL code   Dispo as per PT 79 year old female with a history of afib on coumadin, htn, hld, copd (not on home 02) presents here for worsening sob for the last several days.    Patients morning Xray looks better and she is clinically much better. Patients Nebs were switched to PRN and IV steroids for one more day. Possible switch to oral tomorrow.    Patient oxygen saturation is 92% on room air at rest. Patient has COPD and therefore needs home oxygen. Patient is aware that she will be going home on oxygen. Patient was tested in a chronic stable state. Despite IV Lasix, Steroid and Antibiotic therapy patients oxygen sat on ambulation is 86% on room air. Patient oxygen is 96% on 2 LPM via nasal cannula with ambulation    # COPD exacerbation? RLL Pneumonia vs fluid overload   - Continue IV solumedrol 60 twice daily  - nebs q6 PRN   - Continue ceftriaxone and azithromycin   - Speech and Swallow recommend Regular, thins diet  - Patient still on 2L of oxygen and likely will need home oxygen  - Continue Lasix 40mg daily BNP is elevated, EF of 50-55% on echo  - keep I<O  - XRay Chest yesterday shows improvement  - Urine Strep and Legionella sent, Mycoplasma antibody titers sent    # Abnormal EKG. No CP  - T wave inversions seen  - Mild to moderate MR on echo and EF of 50-55%  - Nuclear Stress test ordered  - outpt f/u with Dr. Hairston    # Afib rate controlled   - Continue Cardizem 360 digoxin and coumadin 5 QD  - dig level 1.0    # Insomnia  - patient states that only ambient wrks but PMD refuses to prescribe in light of COPD and CO2 retention which is reasonable  - outpatient sleep specialist referral   - Patient slept well last night    # HTN  - Continue enalapril    # Hyperkalemia  - K of 5.5 today  - Put on K restricted diet    # DVT ppx on AC  # DASH diet   # FULL code   Dispo as per PT

## 2019-09-10 NOTE — PROGRESS NOTE ADULT - SUBJECTIVE AND OBJECTIVE BOX
SUBJECTIVE:    Patient is a 79y old Female who presents with a chief complaint of SOB (09 Sep 2019 12:31)    Currently admitted to medicine with the primary diagnosis of COPD exacerbation     Today is hospital day 4d. This morning she is resting comfortably in bed and reports no new issues or overnight events.     INTERVAL EVENTS: Patient feels better today. SOB better,    PAST MEDICAL & SURGICAL HISTORY  COPD without exacerbation  Afib      ALLERGIES:  No Known Allergies    MEDICATIONS:  STANDING MEDICATIONS  aspirin  chewable 81 milliGRAM(s) Oral daily  atorvastatin 10 milliGRAM(s) Oral at bedtime  azithromycin  IVPB 500 milliGRAM(s) IV Intermittent every 24 hours  cefTRIAXone   IVPB 1000 milliGRAM(s) IV Intermittent every 24 hours  chlorhexidine 4% Liquid 1 Application(s) Topical <User Schedule>  digoxin     Tablet 0.25 milliGRAM(s) Oral daily  diltiazem    milliGRAM(s) Oral daily  enalapril 5 milliGRAM(s) Oral daily  furosemide    Tablet 40 milliGRAM(s) Oral daily  melatonin 5 milliGRAM(s) Oral at bedtime  methylPREDNISolone sodium succinate Injectable 40 milliGRAM(s) IV Push every 12 hours  pantoprazole    Tablet 40 milliGRAM(s) Oral before breakfast  sodium polystyrene sulfonate Suspension 30 Gram(s) Oral once    PRN MEDICATIONS  ALBUTerol/ipratropium for Nebulization 3 milliLiter(s) Nebulizer every 6 hours PRN  guaiFENesin    Syrup 100 milliGRAM(s) Oral every 6 hours PRN  zolpidem 5 milliGRAM(s) Oral at bedtime PRN    VITALS:   T(F): 96  HR: 89  BP: 176/70  RR: 20  SpO2: 86%    LABS:                        11.6   10.32 )-----------( 343      ( 10 Sep 2019 05:06 )             38.7     09-10    136  |  95<L>  |  35<H>  ----------------------------<  142<H>  5.5<H>   |  33<H>  |  0.8    Ca    8.8      10 Sep 2019 05:06  Phos  5.1     09-09  Mg     2.4     09-10    TPro  6.3  /  Alb  3.8  /  TBili  0.2  /  DBili  x   /  AST  14  /  ALT  26  /  AlkPhos  83  09-10    PT/INR - ( 10 Sep 2019 05:06 )   PT: 26.80 sec;   INR: 2.35 ratio         PTT - ( 10 Sep 2019 05:06 )  PTT:33.8 sec              RADIOLOGY:    PHYSICAL EXAM:  GEN: No acute distress  PULM/CHEST: No wheezing heard, no rhonchi good air entry bilaterally  CVS: Irregularly irregular  ABD: Soft, non-tender, non-distended, +BS  EXT: No edema  NEURO: AAOx3 SUBJECTIVE:    Patient is a 79y old Female who presents with a chief complaint of SOB (09 Sep 2019 12:31)    Currently admitted to medicine with the primary diagnosis of COPD exacerbation     Today is hospital day 4d. This morning she is resting comfortably in bed and reports no new issues or overnight events.     INTERVAL EVENTS: Patient feels better today. SOB better. Still SOB on ambulation    PAST MEDICAL & SURGICAL HISTORY  COPD without exacerbation  Afib      ALLERGIES:  No Known Allergies    MEDICATIONS:  STANDING MEDICATIONS  aspirin  chewable 81 milliGRAM(s) Oral daily  atorvastatin 10 milliGRAM(s) Oral at bedtime  azithromycin  IVPB 500 milliGRAM(s) IV Intermittent every 24 hours  cefTRIAXone   IVPB 1000 milliGRAM(s) IV Intermittent every 24 hours  chlorhexidine 4% Liquid 1 Application(s) Topical <User Schedule>  digoxin     Tablet 0.25 milliGRAM(s) Oral daily  diltiazem    milliGRAM(s) Oral daily  enalapril 5 milliGRAM(s) Oral daily  furosemide    Tablet 40 milliGRAM(s) Oral daily  melatonin 5 milliGRAM(s) Oral at bedtime  methylPREDNISolone sodium succinate Injectable 40 milliGRAM(s) IV Push every 12 hours  pantoprazole    Tablet 40 milliGRAM(s) Oral before breakfast  sodium polystyrene sulfonate Suspension 30 Gram(s) Oral once    PRN MEDICATIONS  ALBUTerol/ipratropium for Nebulization 3 milliLiter(s) Nebulizer every 6 hours PRN  guaiFENesin    Syrup 100 milliGRAM(s) Oral every 6 hours PRN  zolpidem 5 milliGRAM(s) Oral at bedtime PRN    VITALS:   T(F): 96  HR: 89  BP: 176/70  RR: 20  SpO2: 86%    LABS:                        11.6   10.32 )-----------( 343      ( 10 Sep 2019 05:06 )             38.7     09-10    136  |  95<L>  |  35<H>  ----------------------------<  142<H>  5.5<H>   |  33<H>  |  0.8    Ca    8.8      10 Sep 2019 05:06  Phos  5.1     09-09  Mg     2.4     09-10    TPro  6.3  /  Alb  3.8  /  TBili  0.2  /  DBili  x   /  AST  14  /  ALT  26  /  AlkPhos  83  09-10    PT/INR - ( 10 Sep 2019 05:06 )   PT: 26.80 sec;   INR: 2.35 ratio         PTT - ( 10 Sep 2019 05:06 )  PTT:33.8 sec              RADIOLOGY:    PHYSICAL EXAM:  GEN: No acute distress  PULM/CHEST: No wheezing heard, no rhonchi good air entry bilaterally  CVS: Irregularly irregular  ABD: Soft, non-tender, non-distended, +BS  EXT: No edema  NEURO: AAOx3

## 2019-09-11 LAB
ALBUMIN SERPL ELPH-MCNC: 4 G/DL — SIGNIFICANT CHANGE UP (ref 3.5–5.2)
ALP SERPL-CCNC: 83 U/L — SIGNIFICANT CHANGE UP (ref 30–115)
ALT FLD-CCNC: 30 U/L — SIGNIFICANT CHANGE UP (ref 0–41)
ANION GAP SERPL CALC-SCNC: 13 MMOL/L — SIGNIFICANT CHANGE UP (ref 7–14)
AST SERPL-CCNC: 17 U/L — SIGNIFICANT CHANGE UP (ref 0–41)
BILIRUB SERPL-MCNC: 0.3 MG/DL — SIGNIFICANT CHANGE UP (ref 0.2–1.2)
BUN SERPL-MCNC: 31 MG/DL — HIGH (ref 10–20)
CALCIUM SERPL-MCNC: 9.1 MG/DL — SIGNIFICANT CHANGE UP (ref 8.5–10.1)
CHLORIDE SERPL-SCNC: 92 MMOL/L — LOW (ref 98–110)
CO2 SERPL-SCNC: 34 MMOL/L — HIGH (ref 17–32)
CREAT SERPL-MCNC: 0.8 MG/DL — SIGNIFICANT CHANGE UP (ref 0.7–1.5)
GLUCOSE SERPL-MCNC: 138 MG/DL — HIGH (ref 70–99)
HCT VFR BLD CALC: 41.2 % — SIGNIFICANT CHANGE UP (ref 37–47)
HGB BLD-MCNC: 12.4 G/DL — SIGNIFICANT CHANGE UP (ref 12–16)
INR BLD: 2.42 RATIO — HIGH (ref 0.65–1.3)
LEGIONELLA AG UR QL: NEGATIVE — SIGNIFICANT CHANGE UP
MAGNESIUM SERPL-MCNC: 2.2 MG/DL — SIGNIFICANT CHANGE UP (ref 1.8–2.4)
MCHC RBC-ENTMCNC: 26.1 PG — LOW (ref 27–31)
MCHC RBC-ENTMCNC: 30.1 G/DL — LOW (ref 32–37)
MCV RBC AUTO: 86.7 FL — SIGNIFICANT CHANGE UP (ref 81–99)
NRBC # BLD: 0 /100 WBCS — SIGNIFICANT CHANGE UP (ref 0–0)
PLATELET # BLD AUTO: 352 K/UL — SIGNIFICANT CHANGE UP (ref 130–400)
POTASSIUM SERPL-MCNC: 5.1 MMOL/L — HIGH (ref 3.5–5)
POTASSIUM SERPL-SCNC: 5.1 MMOL/L — HIGH (ref 3.5–5)
PROT SERPL-MCNC: 6.5 G/DL — SIGNIFICANT CHANGE UP (ref 6–8)
PROTHROM AB SERPL-ACNC: 27.6 SEC — HIGH (ref 9.95–12.87)
RBC # BLD: 4.75 M/UL — SIGNIFICANT CHANGE UP (ref 4.2–5.4)
RBC # FLD: 14.6 % — HIGH (ref 11.5–14.5)
SODIUM SERPL-SCNC: 139 MMOL/L — SIGNIFICANT CHANGE UP (ref 135–146)
WBC # BLD: 9.86 K/UL — SIGNIFICANT CHANGE UP (ref 4.8–10.8)
WBC # FLD AUTO: 9.86 K/UL — SIGNIFICANT CHANGE UP (ref 4.8–10.8)

## 2019-09-11 PROCEDURE — 78452 HT MUSCLE IMAGE SPECT MULT: CPT | Mod: 26

## 2019-09-11 PROCEDURE — 99233 SBSQ HOSP IP/OBS HIGH 50: CPT

## 2019-09-11 PROCEDURE — 93970 EXTREMITY STUDY: CPT | Mod: 26

## 2019-09-11 PROCEDURE — 99222 1ST HOSP IP/OBS MODERATE 55: CPT | Mod: 25

## 2019-09-11 PROCEDURE — 93018 CV STRESS TEST I&R ONLY: CPT

## 2019-09-11 PROCEDURE — 93016 CV STRESS TEST SUPVJ ONLY: CPT

## 2019-09-11 RX ORDER — WARFARIN SODIUM 2.5 MG/1
4 TABLET ORAL ONCE
Refills: 0 | Status: COMPLETED | OUTPATIENT
Start: 2019-09-11 | End: 2019-09-11

## 2019-09-11 RX ORDER — LISINOPRIL 2.5 MG/1
40 TABLET ORAL DAILY
Refills: 0 | Status: DISCONTINUED | OUTPATIENT
Start: 2019-09-11 | End: 2019-09-13

## 2019-09-11 RX ADMIN — Medication 0.25 MILLIGRAM(S): at 05:46

## 2019-09-11 RX ADMIN — PANTOPRAZOLE SODIUM 40 MILLIGRAM(S): 20 TABLET, DELAYED RELEASE ORAL at 05:46

## 2019-09-11 RX ADMIN — Medication 81 MILLIGRAM(S): at 12:30

## 2019-09-11 RX ADMIN — REGADENOSON 0.4 MILLIGRAM(S): 0.08 INJECTION, SOLUTION INTRAVENOUS at 10:52

## 2019-09-11 RX ADMIN — Medication 360 MILLIGRAM(S): at 05:46

## 2019-09-11 RX ADMIN — Medication 5 MILLIGRAM(S): at 05:46

## 2019-09-11 RX ADMIN — Medication 5 MILLIGRAM(S): at 22:03

## 2019-09-11 RX ADMIN — Medication 40 MILLIGRAM(S): at 05:47

## 2019-09-11 RX ADMIN — WARFARIN SODIUM 4 MILLIGRAM(S): 2.5 TABLET ORAL at 22:03

## 2019-09-11 RX ADMIN — ATORVASTATIN CALCIUM 10 MILLIGRAM(S): 80 TABLET, FILM COATED ORAL at 22:03

## 2019-09-11 RX ADMIN — Medication 40 MILLIGRAM(S): at 05:46

## 2019-09-11 RX ADMIN — LISINOPRIL 40 MILLIGRAM(S): 2.5 TABLET ORAL at 12:30

## 2019-09-11 RX ADMIN — CHLORHEXIDINE GLUCONATE 1 APPLICATION(S): 213 SOLUTION TOPICAL at 05:47

## 2019-09-11 NOTE — PROGRESS NOTE ADULT - ASSESSMENT
A 78 yo female with PMH of HTN, COPD, AFib on Coumadin was admitted to the hospital for acute SoB and Hypoxia. Patient found with acute COPD exacerbation.    A/P:   Acute COPD exacerbation ; improved   Right lower lobe pneumonia, likely gram negative. Sepsis ruled out on admission.   CXR showed right lower lung infiltrates   s/p IV rocephin 6 doses. 5 doses azithromycin   switch to po prednisone    o2 sats at rest today is 86-87 %  will likely need home o2   check LE duplex     Chronic Atrial fibrillation and NSVT on tele   Continue Digoxin, Cardizem and Coumadin. INR 2.35 yesterday therapeutic. rechekc inr and check digoxin level   Echo showed normal lVEF 50-55%, mild to mod MR  stress test done pending read   Cardiology eval         HTN  not controlled   enalapril 5 mg changed to lisinopril 40 qday recheck BP monitor potassium 5.1 on ace'I   avoid bb for now patient with acute COPD       #Progress Note Handoff:  Pending (specify): improving COPD, stress test results , cardiology eval   Family discussion: with patient,  Disposition: Home with home with care. She will  need home O2.

## 2019-09-11 NOTE — CONSULT NOTE ADULT - ASSESSMENT
***ATTENDING ATTESTATION TO FOLLOW***    IMPRESSION   COPD exacerbation secondary to CAP   Volume overload due to Possible HFpEF   Asymptomatic NSVT - EKG with old TWIs and normal stress test   Normal EF, moderate mitral regurgitation, mitral valve calcifications, increased LA size (old)   Permanent a fib - Chadsvasc 4 - rate ~ 90    RECOMMENDATIONS       ***ATTENDING ATTESTATION TO FOLLOW*** ***ATTENDING ATTESTATION TO FOLLOW***    IMPRESSION   COPD exacerbation secondary to CAP   Volume overload due to Possible HFpEF   Asymptomatic NSVT - EKG with old TWIs and normal stress test   Normal EF, moderate mitral regurgitation, mitral valve calcifications, increased LA size (old)   Permanent a fib - Chadsvasc 4 - rate ~ 90    RECOMMENDATIONS   - COPD and lung disease optimization  - Attempt to wean off oxygen   - Continue diuresis   - Avoid beta blocker for now given COPD exacerbation   - Continue Cardizem and digoxin for rate control and coumadin for anticoagulation   - Continue ASA and statin   - Follow up with Dr. Hairston as outpatient for possible ischemic workup       ***ATTENDING ATTESTATION TO FOLLOW*** IMPRESSION   COPD exacerbation secondary to CAP   Volume overload due to Possible HFpEF   Asymptomatic NSVT - EKG with old TWIs and normal stress test   Normal EF, moderate mitral regurgitation, mitral valve calcifications, increased LA size (old)   Permanent a fib - Chadsvasc 4 - rate ~ 90    RECOMMENDATIONS   - COPD and lung disease optimization  - Attempt to wean off oxygen   - Continue diuresis   - Avoid beta blocker for now given COPD exacerbation   - Continue Cardizem and digoxin for rate control and coumadin for anticoagulation   - Continue ASA and statin   - Follow up with Dr. Hairston as outpatient for possible ischemic workup

## 2019-09-11 NOTE — PROGRESS NOTE ADULT - SUBJECTIVE AND OBJECTIVE BOX
SUBJECTIVE:    Patient is a 79y old Female who presents with a chief complaint of SOB (10 Sep 2019 13:52)    Currently admitted to medicine with the primary diagnosis of COPD exacerbation     Today is hospital day 5d. This morning she is resting comfortably in bed and reports no new issues or overnight events.     INTERVAL EVENTS: Patient clinically better. Seems upset that she may need home oxygen    PAST MEDICAL & SURGICAL HISTORY  COPD without exacerbation  Afib      ALLERGIES:  No Known Allergies    MEDICATIONS:  STANDING MEDICATIONS  aspirin  chewable 81 milliGRAM(s) Oral daily  atorvastatin 10 milliGRAM(s) Oral at bedtime  azithromycin  IVPB 500 milliGRAM(s) IV Intermittent every 24 hours  chlorhexidine 4% Liquid 1 Application(s) Topical <User Schedule>  digoxin     Tablet 0.25 milliGRAM(s) Oral daily  diltiazem    milliGRAM(s) Oral daily  enalapril 5 milliGRAM(s) Oral daily  furosemide    Tablet 40 milliGRAM(s) Oral daily  melatonin 5 milliGRAM(s) Oral at bedtime  methylPREDNISolone sodium succinate Injectable 40 milliGRAM(s) IV Push every 12 hours  pantoprazole    Tablet 40 milliGRAM(s) Oral before breakfast  regadenoson Injectable 0.4 milliGRAM(s) IV Push once  warfarin 4 milliGRAM(s) Oral daily    PRN MEDICATIONS  ALBUTerol/ipratropium for Nebulization 3 milliLiter(s) Nebulizer every 6 hours PRN  guaiFENesin    Syrup 100 milliGRAM(s) Oral every 6 hours PRN  zolpidem 5 milliGRAM(s) Oral at bedtime PRN    VITALS:   T(F): 96.2  HR: 62  BP: 120/57  RR: 18  SpO2: 93%    LABS:                        11.6   10.32 )-----------( 343      ( 10 Sep 2019 05:06 )             38.7     09-10    136  |  95<L>  |  35<H>  ----------------------------<  142<H>  5.5<H>   |  33<H>  |  0.8    Ca    8.8      10 Sep 2019 05:06  Mg     2.4     09-10    TPro  6.3  /  Alb  3.8  /  TBili  0.2  /  DBili  x   /  AST  14  /  ALT  26  /  AlkPhos  83  09-10    PT/INR - ( 10 Sep 2019 05:06 )   PT: 26.80 sec;   INR: 2.35 ratio         PTT - ( 10 Sep 2019 05:06 )  PTT:33.8 sec  Urinalysis Basic - ( 10 Sep 2019 21:30 )    Color: Light Yellow / Appearance: Clear / S.018 / pH: x  Gluc: x / Ketone: Negative  / Bili: Negative / Urobili: <2 mg/dL   Blood: x / Protein: Trace / Nitrite: Negative   Leuk Esterase: Negative / RBC: x / WBC x   Sq Epi: x / Non Sq Epi: x / Bacteria: x                RADIOLOGY:    PHYSICAL EXAM:  GEN: No acute distress  PULM/CHEST: Clear to auscultation bilaterally, no rales, rhonchi or wheezes on 2L nasal cannula  CVS: Regular rate and rhythm, S1-S2, no murmurs  ABD: Soft, non-tender, non-distended, +BS  EXT: No edema  NEURO: AAOx3    Rawls Catheter: SUBJECTIVE:    Patient is a 79y old Female who presents with a chief complaint of SOB (10 Sep 2019 13:52)    Currently admitted to medicine with the primary diagnosis of COPD exacerbation     Today is hospital day 5d. This morning she is resting comfortably in bed and reports no new issues or overnight events.     INTERVAL EVENTS: Patient clinically better. Seems upset that she may need home oxygen and will get stress test today.    PAST MEDICAL & SURGICAL HISTORY  COPD without exacerbation  Afib      ALLERGIES:  No Known Allergies    MEDICATIONS:  STANDING MEDICATIONS  aspirin  chewable 81 milliGRAM(s) Oral daily  atorvastatin 10 milliGRAM(s) Oral at bedtime  azithromycin  IVPB 500 milliGRAM(s) IV Intermittent every 24 hours  chlorhexidine 4% Liquid 1 Application(s) Topical <User Schedule>  digoxin     Tablet 0.25 milliGRAM(s) Oral daily  diltiazem    milliGRAM(s) Oral daily  enalapril 5 milliGRAM(s) Oral daily  furosemide    Tablet 40 milliGRAM(s) Oral daily  melatonin 5 milliGRAM(s) Oral at bedtime  methylPREDNISolone sodium succinate Injectable 40 milliGRAM(s) IV Push every 12 hours  pantoprazole    Tablet 40 milliGRAM(s) Oral before breakfast  regadenoson Injectable 0.4 milliGRAM(s) IV Push once  warfarin 4 milliGRAM(s) Oral daily    PRN MEDICATIONS  ALBUTerol/ipratropium for Nebulization 3 milliLiter(s) Nebulizer every 6 hours PRN  guaiFENesin    Syrup 100 milliGRAM(s) Oral every 6 hours PRN  zolpidem 5 milliGRAM(s) Oral at bedtime PRN    VITALS:   T(F): 96.2  HR: 62  BP: 120/57  RR: 18  SpO2: 93%    LABS:                        11.6   10.32 )-----------( 343      ( 10 Sep 2019 05:06 )             38.7     09-10    136  |  95<L>  |  35<H>  ----------------------------<  142<H>  5.5<H>   |  33<H>  |  0.8    Ca    8.8      10 Sep 2019 05:06  Mg     2.4     09-10    TPro  6.3  /  Alb  3.8  /  TBili  0.2  /  DBili  x   /  AST  14  /  ALT  26  /  AlkPhos  83  09-10    PT/INR - ( 10 Sep 2019 05:06 )   PT: 26.80 sec;   INR: 2.35 ratio         PTT - ( 10 Sep 2019 05:06 )  PTT:33.8 sec  Urinalysis Basic - ( 10 Sep 2019 21:30 )    Color: Light Yellow / Appearance: Clear / S.018 / pH: x  Gluc: x / Ketone: Negative  / Bili: Negative / Urobili: <2 mg/dL   Blood: x / Protein: Trace / Nitrite: Negative   Leuk Esterase: Negative / RBC: x / WBC x   Sq Epi: x / Non Sq Epi: x / Bacteria: x                RADIOLOGY:    PHYSICAL EXAM:  GEN: No acute distress  PULM/CHEST: Clear to auscultation bilaterally, no rales, rhonchi or wheezes on 2L nasal cannula  CVS: Regular rate and rhythm, S1-S2, no murmurs  ABD: Soft, non-tender, non-distended, +BS  EXT: No edema  NEURO: AAOx3    Rawls Catheter: SUBJECTIVE:    Patient is a 79y old Female who presents with a chief complaint of SOB (10 Sep 2019 13:52)    Currently admitted to medicine with the primary diagnosis of COPD exacerbation     Today is hospital day 5d. This morning she is resting comfortably in bed and reports no new issues or overnight events.     INTERVAL EVENTS: Patient clinically better. Seems upset that she may need home oxygen and got stress test today Negative.     PAST MEDICAL & SURGICAL HISTORY  COPD without exacerbation  Afib      ALLERGIES:  No Known Allergies    MEDICATIONS:  STANDING MEDICATIONS  aspirin  chewable 81 milliGRAM(s) Oral daily  atorvastatin 10 milliGRAM(s) Oral at bedtime  azithromycin  IVPB 500 milliGRAM(s) IV Intermittent every 24 hours  chlorhexidine 4% Liquid 1 Application(s) Topical <User Schedule>  digoxin     Tablet 0.25 milliGRAM(s) Oral daily  diltiazem    milliGRAM(s) Oral daily  enalapril 5 milliGRAM(s) Oral daily  furosemide    Tablet 40 milliGRAM(s) Oral daily  melatonin 5 milliGRAM(s) Oral at bedtime  methylPREDNISolone sodium succinate Injectable 40 milliGRAM(s) IV Push every 12 hours  pantoprazole    Tablet 40 milliGRAM(s) Oral before breakfast  regadenoson Injectable 0.4 milliGRAM(s) IV Push once  warfarin 4 milliGRAM(s) Oral daily    PRN MEDICATIONS  ALBUTerol/ipratropium for Nebulization 3 milliLiter(s) Nebulizer every 6 hours PRN  guaiFENesin    Syrup 100 milliGRAM(s) Oral every 6 hours PRN  zolpidem 5 milliGRAM(s) Oral at bedtime PRN    VITALS:   T(F): 96.2  HR: 62  BP: 120/57  RR: 18  SpO2: 93%    LABS:                        11.6   10.32 )-----------( 343      ( 10 Sep 2019 05:06 )             38.7     09-10    136  |  95<L>  |  35<H>  ----------------------------<  142<H>  5.5<H>   |  33<H>  |  0.8    Ca    8.8      10 Sep 2019 05:06  Mg     2.4     09-10    TPro  6.3  /  Alb  3.8  /  TBili  0.2  /  DBili  x   /  AST  14  /  ALT  26  /  AlkPhos  83  09-10    PT/INR - ( 10 Sep 2019 05:06 )   PT: 26.80 sec;   INR: 2.35 ratio         PTT - ( 10 Sep 2019 05:06 )  PTT:33.8 sec  Urinalysis Basic - ( 10 Sep 2019 21:30 )    Color: Light Yellow / Appearance: Clear / S.018 / pH: x  Gluc: x / Ketone: Negative  / Bili: Negative / Urobili: <2 mg/dL   Blood: x / Protein: Trace / Nitrite: Negative   Leuk Esterase: Negative / RBC: x / WBC x   Sq Epi: x / Non Sq Epi: x / Bacteria: x                RADIOLOGY:    PHYSICAL EXAM:  GEN: No acute distress  PULM/CHEST: Clear to auscultation bilaterally, no rales, rhonchi or wheezes on 2L nasal cannula  CVS: Regular rate and rhythm, S1-S2, no murmurs  ABD: Soft, non-tender, non-distended, +BS  EXT: No edema  NEURO: AAOx3    Rawls Catheter:

## 2019-09-11 NOTE — CONSULT NOTE ADULT - SUBJECTIVE AND OBJECTIVE BOX
Date of Admission: 19    CHIEF COMPLAINT: Shortness of breath     HISTORY OF PRESENT ILLNESS:     79 year old female with a history of afib on coumadin and cardizem, HTN, HLD, COPD (not on home 02) presents here for worsening sob for 5 days duration.    patient was doing well until 4 days prior to presentation when she started productive cough and worsening SOB, with no fever or chills, no sick contact,   Patient denies cp n/v abdominal pain, urinary or symptoms   She was admitted for COPD exacerbation secondary to community acquired pneumonia and possible volume overload     She says her main reason for admission was feeling weak. She denies palpitations, no chest pain, no orthopnea or PND, no syncope or presyncope.     On admission, she had TWI in inferior and lateral leads and had episodes of NSVT on monitor. She had an ECHO done as below and a normal nuclear stress test.     Cardiology was called due to episodes of NSVT on monitor which were asymptomatic     PAST MEDICAL & SURGICAL HISTORY  COPD without exacerbation  Afib      FAMILY HISTORY:  FAMILY HISTORY:  FH: HTN (hypertension)      SOCIAL HISTORY:  [-] ex smoker  [-] Alcohol  [-] Drug    ROS:  Negative except as mentioned in HPI    ALLERGIES:  No Known Allergies      MEDICATIONS:  MEDICATIONS  (STANDING):  aspirin  chewable 81 milliGRAM(s) Oral daily  atorvastatin 10 milliGRAM(s) Oral at bedtime  chlorhexidine 4% Liquid 1 Application(s) Topical <User Schedule>  digoxin     Tablet 0.25 milliGRAM(s) Oral daily  diltiazem    milliGRAM(s) Oral daily  furosemide    Tablet 40 milliGRAM(s) Oral daily  lisinopril 40 milliGRAM(s) Oral daily  melatonin 5 milliGRAM(s) Oral at bedtime  pantoprazole    Tablet 40 milliGRAM(s) Oral before breakfast  warfarin 4 milliGRAM(s) Oral once    MEDICATIONS  (PRN):  ALBUTerol/ipratropium for Nebulization 3 milliLiter(s) Nebulizer every 6 hours PRN Shortness of Breath and/or Wheezing  guaiFENesin    Syrup 100 milliGRAM(s) Oral every 6 hours PRN Cough  zolpidem 5 milliGRAM(s) Oral at bedtime PRN Insomnia      HOME MEDICATIONS:  Home Medications:      VITALS:   T(F): 97 ( @ 13:30), Max: 98 ( @ 20:15)  HR: 98 ( @ 13:30) (72 - 98)  BP: 166/68 ( @ 13:30) (119/56 - 185/81)  BP(mean): --  RR: 20 ( @ 13:30) (18 - 96)  SpO2: 93% ( @ 06:20) (86% - 96%)    I&O's Summary    10 Sep 2019 07:  -  11 Sep 2019 07:00  --------------------------------------------------------  IN: 0 mL / OUT: 800 mL / NET: -800 mL    11 Sep 2019 07:  -  11 Sep 2019 17:05  --------------------------------------------------------  IN: 210 mL / OUT: 1800 mL / NET: -1590 mL        PHYSICAL EXAM:  GEN: Not in acute distress, in chair on 2L O2 via NC   LUNGS: Poor airway entry bilaterally, no wheezing or crackles   CARDIOVASCULAR: Irregularly irregular, tachycardic, S1/S2 present, no murmurs, rubs or gallops, no JVD, + PP bilaterally  ABD: Soft, non-tender, non-distended  EXT: No LON  SKIN: Intact  NEURO: AAOx3    LABS:                        12.4   9.86  )-----------( 352      ( 11 Sep 2019 07:29 )             41.2         139  |  92<L>  |  31<H>  ----------------------------<  138<H>  5.1<H>   |  34<H>  |  0.8    Ca    9.1      11 Sep 2019 07:29  Mg     2.2         TPro  6.5  /  Alb  4.0  /  TBili  0.3  /  DBili  x   /  AST  17  /  ALT  30  /  AlkPhos  83      PT/INR - ( 10 Sep 2019 05:06 )   PT: 26.80 sec;   INR: 2.35 ratio         PTT - ( 10 Sep 2019 05:06 )  PTT:33.8 sec    Troponin <0.01, CKMB --, CK --/ 19 @ 11:57  Troponin <0.01, CKMB --, CK --/ 19 @ 18:00        Hemoglobin A1C   Thyroid      RADIOLOGY:  -CXR:  < from: Xray Chest 1 View- PORTABLE-Routine (19 @ 07:19) >  IMPRESSION:    Stable hyperaeration.    Improving right basilar opacity. Enlarged central pulmonary vascularity.   No evidence of pleural effusion or pneumothorax.    -TTE:  < from: Transthoracic Echocardiogram (19 @ 12:07) >  Summary:   1. Left ventricular ejection fraction, by visual estimation, is 50 to   55%.   2. Mild to moderate mitral valve regurgitation.   3. Thickening and calcification of the anterior and posterior mitral   valve leaflets.    PHYSICIAN INTERPRETATION:  Left Ventricle: The left ventricular internal cavity size is normal. Left   ventricular wall thickness is normal. Left ventricular ejection fraction,   by visual estimation, is 50 to 55%.  Right Ventricle: Normal right ventricular size and function.  Left Atrium: Left atrial enlargement.  Right Atrium: Normal right atrial size.  Pericardium: There is no evidence of pericardial effusion.  Mitral Valve: Thickening and calcification of the anterior and posterior   mitral valve leaflets. No evidence of mitral stenosis. Mild to moderate   mitral valve regurgitation is seen.  Tricuspid Valve: Mild tricuspid regurgitation is visualized.  Aortic Valve: No evidence ofaortic stenosis. Trivial aortic valve   regurgitation is seen.  Pulmonic Valve: No indication of pulmonic valve regurgitation.  Aorta: The aortic root is normal in size and structure.  Venous: The inferior vena cava was dilated, with respiratory size  variation less than 50%, consistent with elevated right atrial pressure.       2D AND M-MODE MEASUREMENTS (normal ranges within parentheses):  Left                  Normal   Aorta/Left             Normal  Ventricle:                     Atrium:  IVSd (2D):  1.15 cm  (0.7-1.1) AoV Cusp       1.62  (1.5-2.6)  LVPWd (2D): 1.61 cm  (0.7-1.1) Separation:     cm  LVIDd (2D): 3.15 cm  (3.4-5.7) Left Atrium    3.75  (1.9-4.0)  LVIDs (2D): 2.37 cm            (Mmode):        cm  LV FS (2D):  24.9 %   (>25%)   LA Volume      42.7  IVSd        1.01 cm  (0.7-1.1) Index         ml/m²  (Mmode):                       Right  LVPWd       1.09 cm  (0.7-1.1) Ventricle:  (Mmode):                       RVd (2D):      2.72 cm  LVIDd       4.51 cm  (3.4-5.7)  (Mmode):  LVIDs       2.85 cm  (Mmode):  LV FS        36.7 %   (>25%)  (Mmode):  Relative      1.02    (<0.42)  Wall  Thickness  Rel. Wall     0.48    (<0.42)  Thickness  Mm  LV Mass      101.2  Index:        g/m²  Mmode    SPECTRAL DOPPLER ANALYSIS:  LV DIASTOLIC FUNCTION:  MV Peak E: 1.13 m/s Decel Time: 127 msec  MV Peak A: 0.27 m/s  E/A Ratio: 4.26    Aortic Valve:  AoV VMax:    1.73 m/s  AoV Area, Vmax: 1.79 cm² Vmax Indx: 1.10 cm²/m²  AoV Pk Grad: 12.0 mmHg    LVOT Vmax: 1.29 m/s  LVOT VTI:  0.23m  LVOT Diam: 1.75 cm    Mitral Valve:  MV P1/2 Time: 36.76 msec  MV Area, PHT: 5.98 cm²    Tricuspid Valve and PA/RV Systolic Pressure: TR Max Velocity: 2.08 m/s RA   Pressure: 15 mmHg RVSP/PASP: 32.3 mmHg    Pulmonic Valve:  PV Max Velocity: 1.31 m/s PV Max P.8 mmHg PV Mean PG:    < end of copied text >    -CCTA:  -STRESS TEST:  < from: NM Nuclear Stress Pharmacologic Multiple (19 @ 11:43) >  Impression:   1. NORMAL LEXISCAN / REST MYOCARDIAL PERFUSION TOMOGRAPHY, WITH NO   EVIDENCE FOR ISCHEMIA DURING LEXISCAN INFUSION.   2. NORMAL RESTING LEFT VENTRICULAR WALL MOTION AND WALL THICKENING.  3. LEFT VENTRICULAR EJECTION FRACTION OF  75 % WHICH IS WITHIN RANGE OF   NORMAL.       -CATHETERIZATION:    ECG:  < from: 12 Lead ECG (19 @ 05:03) >  Diagnosis Line Atrial fibrillation with premature ventricular or aberrantly conducted  complexes  Rightward axis  Incomplete right bundle branch block  Voltage criteria for left ventricular hypertrophy  Marked ST abnormality, possible inferior subendocardial injury  Abnormal ECG    < end of copied text >      TELEMETRY EVENTS:   5x NSVT up to 9 beats Date of Admission: 19    CHIEF COMPLAINT: Shortness of breath     HISTORY OF PRESENT ILLNESS:     79 year old female with a history of afib on coumadin and cardizem, HTN, HLD, COPD (not on home 02) presents here for worsening sob for 5 days duration.    patient was doing well until 4 days prior to presentation when she started productive cough and worsening SOB, with no fever or chills, no sick contact,   Patient denies cp n/v abdominal pain, urinary or symptoms   She was admitted for COPD exacerbation secondary to community acquired pneumonia and possible volume overload     She says her main reason for admission was feeling weak. She denies palpitations, no chest pain, no orthopnea or PND, no syncope or presyncope.     On admission, she had TWI in inferior and lateral leads and had episodes of NSVT on monitor. She had an ECHO done as below and a normal nuclear stress test.     Cardiology was called due to episodes of NSVT on monitor which were asymptomatic     PAST MEDICAL & SURGICAL HISTORY  COPD without exacerbation  Afib      FAMILY HISTORY:  FAMILY HISTORY:  FH: HTN (hypertension)      SOCIAL HISTORY:  [-] ex smoker  [-] Alcohol  [-] Drug    ROS:  Negative except as mentioned in HPI    ALLERGIES:  No Known Allergies      MEDICATIONS:  MEDICATIONS  (STANDING):  aspirin  chewable 81 milliGRAM(s) Oral daily  atorvastatin 10 milliGRAM(s) Oral at bedtime  chlorhexidine 4% Liquid 1 Application(s) Topical <User Schedule>  digoxin     Tablet 0.25 milliGRAM(s) Oral daily  diltiazem    milliGRAM(s) Oral daily  furosemide    Tablet 40 milliGRAM(s) Oral daily  lisinopril 40 milliGRAM(s) Oral daily  melatonin 5 milliGRAM(s) Oral at bedtime  pantoprazole    Tablet 40 milliGRAM(s) Oral before breakfast  warfarin 4 milliGRAM(s) Oral once    MEDICATIONS  (PRN):  ALBUTerol/ipratropium for Nebulization 3 milliLiter(s) Nebulizer every 6 hours PRN Shortness of Breath and/or Wheezing  guaiFENesin    Syrup 100 milliGRAM(s) Oral every 6 hours PRN Cough  zolpidem 5 milliGRAM(s) Oral at bedtime PRN Insomnia      HOME MEDICATIONS:  Home Medications:      VITALS:   T(F): 97 ( @ 13:30), Max: 98 ( @ 20:15)  HR: 98 ( @ 13:30) (72 - 98)  BP: 166/68 ( @ 13:30) (119/56 - 185/81)  BP(mean): --  RR: 20 ( @ 13:30) (18 - 96)  SpO2: 93% ( @ 06:20) (86% - 96%)    I&O's Summary    10 Sep 2019 07:  -  11 Sep 2019 07:00  --------------------------------------------------------  IN: 0 mL / OUT: 800 mL / NET: -800 mL    11 Sep 2019 07:  -  11 Sep 2019 17:05  --------------------------------------------------------  IN: 210 mL / OUT: 1800 mL / NET: -1590 mL        PHYSICAL EXAM:  GEN: Not in acute distress, in chair on 2L O2 via NC   HEENT: NC/AT  Neck: no JVD  LUNGS: Poor airway entry bilaterally, no wheezing or crackles   CARDIOVASCULAR: Irregularly irregular, tachycardic, S1/S2 present, no murmurs, rubs or gallops, no JVD, + PP bilaterally  ABD: Soft, non-tender, non-distended  EXT/MSK: No LON  SKIN: Intact  NEURO: AAOx3    LABS:                        12.4   9.86  )-----------( 352      ( 11 Sep 2019 07:29 )             41.2         139  |  92<L>  |  31<H>  ----------------------------<  138<H>  5.1<H>   |  34<H>  |  0.8    Ca    9.1      11 Sep 2019 07:29  Mg     2.2         TPro  6.5  /  Alb  4.0  /  TBili  0.3  /  DBili  x   /  AST  17  /  ALT  30  /  AlkPhos  83      PT/INR - ( 10 Sep 2019 05:06 )   PT: 26.80 sec;   INR: 2.35 ratio         PTT - ( 10 Sep 2019 05:06 )  PTT:33.8 sec    Troponin <0.01, CKMB --, CK --/ 19 @ 11:57  Troponin <0.01, CKMB --, CK --/ 19 @ 18:00        Hemoglobin A1C   Thyroid      RADIOLOGY:  -CXR:  < from: Xray Chest 1 View- PORTABLE-Routine (19 @ 07:19) >  IMPRESSION:    Stable hyperaeration.    Improving right basilar opacity. Enlarged central pulmonary vascularity.   No evidence of pleural effusion or pneumothorax.    -TTE:  < from: Transthoracic Echocardiogram (19 @ 12:07) >  Summary:   1. Left ventricular ejection fraction, by visual estimation, is 50 to   55%.   2. Mild to moderate mitral valve regurgitation.   3. Thickening and calcification of the anterior and posterior mitral   valve leaflets.    PHYSICIAN INTERPRETATION:  Left Ventricle: The left ventricular internal cavity size is normal. Left   ventricular wall thickness is normal. Left ventricular ejection fraction,   by visual estimation, is 50 to 55%.  Right Ventricle: Normal right ventricular size and function.  Left Atrium: Left atrial enlargement.  Right Atrium: Normal right atrial size.  Pericardium: There is no evidence of pericardial effusion.  Mitral Valve: Thickening and calcification of the anterior and posterior   mitral valve leaflets. No evidence of mitral stenosis. Mild to moderate   mitral valve regurgitation is seen.  Tricuspid Valve: Mild tricuspid regurgitation is visualized.  Aortic Valve: No evidence ofaortic stenosis. Trivial aortic valve   regurgitation is seen.  Pulmonic Valve: No indication of pulmonic valve regurgitation.  Aorta: The aortic root is normal in size and structure.  Venous: The inferior vena cava was dilated, with respiratory size  variation less than 50%, consistent with elevated right atrial pressure.       2D AND M-MODE MEASUREMENTS (normal ranges within parentheses):  Left                  Normal   Aorta/Left             Normal  Ventricle:                     Atrium:  IVSd (2D):  1.15 cm  (0.7-1.1) AoV Cusp       1.62  (1.5-2.6)  LVPWd (2D): 1.61 cm  (0.7-1.1) Separation:     cm  LVIDd (2D): 3.15 cm  (3.4-5.7) Left Atrium    3.75  (1.9-4.0)  LVIDs (2D): 2.37 cm            (Mmode):        cm  LV FS (2D):  24.9 %   (>25%)   LA Volume      42.7  IVSd        1.01 cm  (0.7-1.1) Index         ml/m²  (Mmode):                       Right  LVPWd       1.09 cm  (0.7-1.1) Ventricle:  (Mmode):                       RVd (2D):      2.72 cm  LVIDd       4.51 cm  (3.4-5.7)  (Mmode):  LVIDs       2.85 cm  (Mmode):  LV FS        36.7 %   (>25%)  (Mmode):  Relative      1.02    (<0.42)  Wall  Thickness  Rel. Wall     0.48    (<0.42)  Thickness  Mm  LV Mass      101.2  Index:        g/m²  Mmode    SPECTRAL DOPPLER ANALYSIS:  LV DIASTOLIC FUNCTION:  MV Peak E: 1.13 m/s Decel Time: 127 msec  MV Peak A: 0.27 m/s  E/A Ratio: 4.26    Aortic Valve:  AoV VMax:    1.73 m/s  AoV Area, Vmax: 1.79 cm² Vmax Indx: 1.10 cm²/m²  AoV Pk Grad: 12.0 mmHg    LVOT Vmax: 1.29 m/s  LVOT VTI:  0.23m  LVOT Diam: 1.75 cm    Mitral Valve:  MV P1/2 Time: 36.76 msec  MV Area, PHT: 5.98 cm²    Tricuspid Valve and PA/RV Systolic Pressure: TR Max Velocity: 2.08 m/s RA   Pressure: 15 mmHg RVSP/PASP: 32.3 mmHg    Pulmonic Valve:  PV Max Velocity: 1.31 m/s PV Max P.8 mmHg PV Mean PG:    < end of copied text >    -CCTA:  -STRESS TEST:  < from: NM Nuclear Stress Pharmacologic Multiple (19 @ 11:43) >  Impression:   1. NORMAL LEXISCAN / REST MYOCARDIAL PERFUSION TOMOGRAPHY, WITH NO   EVIDENCE FOR ISCHEMIA DURING LEXISCAN INFUSION.   2. NORMAL RESTING LEFT VENTRICULAR WALL MOTION AND WALL THICKENING.  3. LEFT VENTRICULAR EJECTION FRACTION OF  75 % WHICH IS WITHIN RANGE OF   NORMAL.       -CATHETERIZATION:    ECG:  < from: 12 Lead ECG (19 @ 05:03) >  Diagnosis Line Atrial fibrillation with premature ventricular or aberrantly conducted  complexes  Rightward axis  Incomplete right bundle branch block  Voltage criteria for left ventricular hypertrophy  Marked ST abnormality, possible inferior subendocardial injury  Abnormal ECG    < end of copied text >      TELEMETRY EVENTS:   5x NSVT up to 9 beats

## 2019-09-11 NOTE — PROGRESS NOTE ADULT - SUBJECTIVE AND OBJECTIVE BOX
no chest pain   sob better   cough better     Vital Signs Last 24 Hrs  T(C): 35.7 (11 Sep 2019 06:16), Max: 36.7 (11 Sep 2019 00:37)  T(F): 96.2 (11 Sep 2019 06:16), Max: 98 (11 Sep 2019 00:37)  HR: 83 (11 Sep 2019 06:15) (79 - 94)  BP: 185/81 (11 Sep 2019 05:45) (162/76 - 185/81)  BP(mean): --  RR: 18 (11 Sep 2019 00:37) (18 - 20)  SpO2: 93% (11 Sep 2019 06:20) (93% - 93%)    PHYSICAL EXAM:  GENERAL: NAD,   Pulm : decreased air entry b/l   CV: Regular rate and rhythm; No murmurs, rubs, or gallops  GI: Soft, Nontender, Nondistended; Bowel sounds present  EXTREMITIES:  2+ Peripheral Pulses, No clubbing, cyanosis, or edema  PSYCH: AAOx3  NEUROLOGY: non-focal  SKIN: No rashes or lesions                          12.4   9.86  )-----------( 352      ( 11 Sep 2019 07:29 )             41.2     09-11    139  |  92<L>  |  31<H>  ----------------------------<  138<H>  5.1<H>   |  34<H>  |  0.8    Ca    9.1      11 Sep 2019 07:29  Mg     2.2     09-11    TPro  6.5  /  Alb  4.0  /  TBili  0.3  /  DBili  x   /  AST  17  /  ALT  30  /  AlkPhos  83  09-11    LIVER FUNCTIONS - ( 11 Sep 2019 07:29 )  Alb: 4.0 g/dL / Pro: 6.5 g/dL / ALK PHOS: 83 U/L / ALT: 30 U/L / AST: 17 U/L / GGT: x           PT/INR - ( 10 Sep 2019 05:06 )   PT: 26.80 sec;   INR: 2.35 ratio         PTT - ( 10 Sep 2019 05:06 )  PTT:33.8 sec    MEDICATIONS  (STANDING):  aspirin  chewable 81 milliGRAM(s) Oral daily  atorvastatin 10 milliGRAM(s) Oral at bedtime  chlorhexidine 4% Liquid 1 Application(s) Topical <User Schedule>  digoxin     Tablet 0.25 milliGRAM(s) Oral daily  diltiazem    milliGRAM(s) Oral daily  furosemide    Tablet 40 milliGRAM(s) Oral daily  lisinopril 40 milliGRAM(s) Oral daily  melatonin 5 milliGRAM(s) Oral at bedtime  pantoprazole    Tablet 40 milliGRAM(s) Oral before breakfast  warfarin 4 milliGRAM(s) Oral once    MEDICATIONS  (PRN):  ALBUTerol/ipratropium for Nebulization 3 milliLiter(s) Nebulizer every 6 hours PRN Shortness of Breath and/or Wheezing  guaiFENesin    Syrup 100 milliGRAM(s) Oral every 6 hours PRN Cough  zolpidem 5 milliGRAM(s) Oral at bedtime PRN Insomnia

## 2019-09-11 NOTE — PROGRESS NOTE ADULT - ASSESSMENT
79 year old female with a history of afib on coumadin, htn, hld, copd (not on home 02) presents here for worsening sob for the last several days.    On 09/10 Patients morning Xray looks better and she is clinically much better. Patients Nebs were switched to PRN and IV steroids for one more day.    Patient clinically better 09/11 and will undergo stress test today.      # COPD exacerbation? RLL Pneumonia vs fluid overload   - Continue IV solumedrol 60 twice daily  - nebs q6 PRN   - Continue ceftriaxone and azithromycin   - Speech and Swallow recommend Regular, thins diet  - Patient still on 2L of oxygen and likely will need home oxygen  - Tried weaning off oxygen yesterday and desaturating to 80s  - Continue Lasix 40mg daily BNP is elevated, EF of 50-55% on echo  - keep I<O  - XRay Chest yesterday shows improvement  - Urine Strep and Legionella sent, Mycoplasma antibody titers sent    # Abnormal EKG. No CP  - T wave inversions seen  - Mild to moderate MR on echo and EF of 50-55%  - Nuclear Stress test ordered  - outpt f/u with Dr. Hairston    # Afib rate controlled   - Continue Cardizem 360 digoxin and coumadin 5 QD  - dig level 1.0    # Insomnia  - patient states that only ambient wrks but PMD refuses to prescribe in light of COPD and CO2 retention which is reasonable  - outpatient sleep specialist referral   - Patient slept well last night    # HTN  - Continue enalapril    # Hyperkalemia  - K of 5.5 today  - Put on K restricted diet    # DVT ppx on AC  # DASH diet   # FULL code   Dispo as per PT 79 year old female with a history of afib on coumadin, htn, hld, copd (not on home 02) presents here for worsening sob for the last several days.    On 09/10 Patients morning Xray looks better and she is clinically much better. Patients Nebs were switched to PRN and IV steroids for one more day.    Patient clinically better 09/11 and will undergo stress test today.    # COPD exacerbation? RLL Pneumonia vs fluid overload   - D/C IV solumedrol 60 twice daily  - Switch to oral Prednisone 40mg  - nebs q6 PRN  - D/C ceftriaxone and azithromycin  - Speech and Swallow recommend Regular, thins diet  - Patient still on 2L of oxygen and likely will need home oxygen  - Tried weaning off oxygen yesterday and desaturating to 80s  - Continue Lasix 40mg daily BNP is elevated, EF of 50-55% on echo  - keep I<O  - XRay Chest yesterday shows improvement  - Urine Strep and Legionella sent, Mycoplasma antibody titers sent    # Abnormal EKG. No CP  - T wave inversions seen  - Mild to moderate MR on echo and EF of 50-55%  - Nuclear Stress test ordered  - outpt f/u with Dr. Hairston    # Afib rate controlled   - Continue Cardizem 360 digoxin and coumadin 5 QD  - dig level 1.0    # Insomnia  - patient states that only ambient wrks but PMD refuses to prescribe in light of COPD and CO2 retention which is reasonable  - outpatient sleep specialist referral   - Patient sleeping well for last 2 days but stressed about the stress test didn't     # HTN  - Continue enalapril    # Hyperkalemia  - K of 5.1 today  - Put on K restricted diet    # DVT ppx on AC  # DASH diet   # FULL code   Dispo as per PT 79 year old female with a history of afib on coumadin, htn, hld, copd (not on home 02) presents here for worsening sob for the last several days.    On 09/10 Patients morning Xray looks better and she is clinically much better. Patients Nebs were switched to PRN and IV steroids for one more day.    Patient clinically better 09/11 and will undergo stress test today.    # COPD exacerbation? RLL Pneumonia vs fluid overload   - D/C IV solumedrol 60 twice daily  - Switch to oral Prednisone 40mg  - nebs q6 PRN  - D/C ceftriaxone and azithromycin  - Speech and Swallow recommend Regular, thins diet  - Patient still on 2L of oxygen and likely will need home oxygen  - Desaturated to 86% without oxygen on ambulation  - Continue Lasix 40mg daily BNP is elevated, EF of 50-55% on echo  - keep I<O  - XRay Chest yesterday shows improvement  - Urine Strep and Legionella sent, Mycoplasma antibody titers sent    # Abnormal EKG. No CP  - T wave inversions seen  - Mild to moderate MR on echo and EF of 50-55%  - Nuclear Stress test today  - outpt f/u with Dr. Hairston    # Afib rate controlled   - Continue Cardizem 360 digoxin and coumadin 5 QD  - dig level 1.0    # Insomnia  - patient states that only ambient wrks but PMD refuses to prescribe in light of COPD and CO2 retention which is reasonable  - outpatient sleep specialist referral   - Patient sleeping well for last 2 days but stressed about the stress test didn't     # HTN  - Continue enalapril    # Hyperkalemia  - K of 5.1 today  - Put on K restricted diet    # DVT ppx on AC  # DASH diet   # FULL code   Dispo as per PT 79 year old female with a history of afib on coumadin, htn, hld, copd (not on home 02) presents here for worsening sob for the last several days.    On 09/10 Patients morning Xray looks better and she is clinically much better. Patients Nebs were switched to PRN and IV steroids for one more day.    Patient clinically better 09/11 and will undergo stress test today.    # COPD exacerbation? RLL Pneumonia vs fluid overload   - D/C IV solumedrol 60 twice daily  - Switch to oral Prednisone 40mg  - nebs q6 PRN  - D/C ceftriaxone and azithromycin  - Speech and Swallow recommend Regular, thins diet  - Patient still on 2L of oxygen and likely will need home oxygen  - Desaturated to 86% without oxygen on ambulation  - Continue Lasix 40mg daily BNP is elevated, EF of 50-55% on echo  - keep I<O  - XRay Chest yesterday shows improvement  - Urine Strep and Legionella sent, Mycoplasma antibody titers sent    # Abnormal EKG. No CP  - T wave inversions seen  - Mild to moderate MR on echo and EF of 50-55%  - Nuclear Stress test today  - outpt f/u with Dr. Hairston    # Afib rate controlled   - Continue Cardizem 360 digoxin and coumadin 5 QD  - dig level 1.0  - INR 2.35    # Insomnia  - patient states that only ambient wrks but PMD refuses to prescribe in light of COPD and CO2 retention which is reasonable  - outpatient sleep specialist referral   - Patient sleeping well for last 2 days but stressed about the stress test didn't     # HTN  - Continue enalapril    # Hyperkalemia  - K of 5.1 today  - Put on K restricted diet    # DVT ppx on AC  # DASH diet   # FULL code   Dispo as per PT 79 year old female with a history of afib on coumadin, htn, hld, copd (not on home 02) presents here for worsening sob for the last several days.    On 09/10 Patients morning Xray looks better and she is clinically much better. Patients Nebs were switched to PRN and IV steroids for one more day.    Patient clinically better 09/11 and will underwent stress test today and was negative. Duplex scan negative.    # COPD exacerbation? RLL Pneumonia vs fluid overload   - D/C IV solumedrol 60 twice daily  - Switch to oral Prednisone 40mg  - nebs q6 PRN  - D/C ceftriaxone and azithromycin  - Speech and Swallow recommend Regular, thins diet  - Patient still on 2L of oxygen and likely will need home oxygen  - Desaturated to 86% without oxygen on ambulation  - Continue Lasix 40mg daily BNP is elevated, EF of 50-55% on echo  - keep I<O  - XRay Chest yesterday shows improvement  - Urine Strep and Legionella sent, Mycoplasma antibody titers sent    # Abnormal EKG. No CP  - T wave inversions seen  - Mild to moderate MR on echo and EF of 50-55%  - Nuclear Stress test today and was normal  - outpt f/u with Dr. Hairston    # Afib rate controlled   - Continue Cardizem 360 digoxin and coumadin 5 QD  - dig level 1.0  - INR 2.35  - duplex to rule out DVT    # Insomnia  - patient states that only ambient wrks but PMD refuses to prescribe in light of COPD and CO2 retention which is reasonable  - outpatient sleep specialist referral   - Patient sleeping well for last 2 days but stressed about the stress test didn't     # HTN  - Continue enalapril    # Hyperkalemia  - K of 5.1 today  - Put on K restricted diet    # DVT ppx on AC  # DASH diet   # FULL code   Dispo as per PT

## 2019-09-12 LAB
ALBUMIN SERPL ELPH-MCNC: 3.5 G/DL — SIGNIFICANT CHANGE UP (ref 3.5–5.2)
ALP SERPL-CCNC: 71 U/L — SIGNIFICANT CHANGE UP (ref 30–115)
ALT FLD-CCNC: 27 U/L — SIGNIFICANT CHANGE UP (ref 0–41)
ANION GAP SERPL CALC-SCNC: 11 MMOL/L — SIGNIFICANT CHANGE UP (ref 7–14)
ANION GAP SERPL CALC-SCNC: 6 MMOL/L — LOW (ref 7–14)
AST SERPL-CCNC: 17 U/L — SIGNIFICANT CHANGE UP (ref 0–41)
BILIRUB SERPL-MCNC: 0.4 MG/DL — SIGNIFICANT CHANGE UP (ref 0.2–1.2)
BUN SERPL-MCNC: 30 MG/DL — HIGH (ref 10–20)
BUN SERPL-MCNC: 34 MG/DL — HIGH (ref 10–20)
CALCIUM SERPL-MCNC: 8.7 MG/DL — SIGNIFICANT CHANGE UP (ref 8.5–10.1)
CALCIUM SERPL-MCNC: 9 MG/DL — SIGNIFICANT CHANGE UP (ref 8.5–10.1)
CHLORIDE SERPL-SCNC: 88 MMOL/L — LOW (ref 98–110)
CHLORIDE SERPL-SCNC: 92 MMOL/L — LOW (ref 98–110)
CO2 SERPL-SCNC: 39 MMOL/L — HIGH (ref 17–32)
CO2 SERPL-SCNC: 42 MMOL/L — CRITICAL HIGH (ref 17–32)
CREAT SERPL-MCNC: 0.9 MG/DL — SIGNIFICANT CHANGE UP (ref 0.7–1.5)
CREAT SERPL-MCNC: 1 MG/DL — SIGNIFICANT CHANGE UP (ref 0.7–1.5)
CULTURE RESULTS: SIGNIFICANT CHANGE UP
GAS PNL BLDA: SIGNIFICANT CHANGE UP
GLUCOSE SERPL-MCNC: 106 MG/DL — HIGH (ref 70–99)
GLUCOSE SERPL-MCNC: 168 MG/DL — HIGH (ref 70–99)
HCT VFR BLD CALC: 40.6 % — SIGNIFICANT CHANGE UP (ref 37–47)
HGB BLD-MCNC: 12.2 G/DL — SIGNIFICANT CHANGE UP (ref 12–16)
INR BLD: 2.31 RATIO — HIGH (ref 0.65–1.3)
MAGNESIUM SERPL-MCNC: 2.2 MG/DL — SIGNIFICANT CHANGE UP (ref 1.8–2.4)
MCHC RBC-ENTMCNC: 26 PG — LOW (ref 27–31)
MCHC RBC-ENTMCNC: 30 G/DL — LOW (ref 32–37)
MCV RBC AUTO: 86.6 FL — SIGNIFICANT CHANGE UP (ref 81–99)
NRBC # BLD: 0 /100 WBCS — SIGNIFICANT CHANGE UP (ref 0–0)
PHOSPHATE SERPL-MCNC: 3.2 MG/DL — SIGNIFICANT CHANGE UP (ref 2.1–4.9)
PLATELET # BLD AUTO: 355 K/UL — SIGNIFICANT CHANGE UP (ref 130–400)
POTASSIUM SERPL-MCNC: 4.7 MMOL/L — SIGNIFICANT CHANGE UP (ref 3.5–5)
POTASSIUM SERPL-MCNC: 5 MMOL/L — SIGNIFICANT CHANGE UP (ref 3.5–5)
POTASSIUM SERPL-SCNC: 4.7 MMOL/L — SIGNIFICANT CHANGE UP (ref 3.5–5)
POTASSIUM SERPL-SCNC: 5 MMOL/L — SIGNIFICANT CHANGE UP (ref 3.5–5)
PROT SERPL-MCNC: 5.8 G/DL — LOW (ref 6–8)
PROTHROM AB SERPL-ACNC: 26.4 SEC — HIGH (ref 9.95–12.87)
RBC # BLD: 4.69 M/UL — SIGNIFICANT CHANGE UP (ref 4.2–5.4)
RBC # FLD: 14.6 % — HIGH (ref 11.5–14.5)
S PNEUM AG UR QL: NEGATIVE — SIGNIFICANT CHANGE UP
SODIUM SERPL-SCNC: 138 MMOL/L — SIGNIFICANT CHANGE UP (ref 135–146)
SODIUM SERPL-SCNC: 140 MMOL/L — SIGNIFICANT CHANGE UP (ref 135–146)
SPECIMEN SOURCE: SIGNIFICANT CHANGE UP
WBC # BLD: 11.25 K/UL — HIGH (ref 4.8–10.8)
WBC # FLD AUTO: 11.25 K/UL — HIGH (ref 4.8–10.8)

## 2019-09-12 PROCEDURE — 99233 SBSQ HOSP IP/OBS HIGH 50: CPT

## 2019-09-12 RX ORDER — WARFARIN SODIUM 2.5 MG/1
4 TABLET ORAL AT BEDTIME
Refills: 0 | Status: DISCONTINUED | OUTPATIENT
Start: 2019-09-12 | End: 2019-09-13

## 2019-09-12 RX ADMIN — WARFARIN SODIUM 4 MILLIGRAM(S): 2.5 TABLET ORAL at 22:02

## 2019-09-12 RX ADMIN — ZOLPIDEM TARTRATE 5 MILLIGRAM(S): 10 TABLET ORAL at 00:33

## 2019-09-12 RX ADMIN — LISINOPRIL 40 MILLIGRAM(S): 2.5 TABLET ORAL at 05:46

## 2019-09-12 RX ADMIN — Medication 5 MILLIGRAM(S): at 22:02

## 2019-09-12 RX ADMIN — Medication 360 MILLIGRAM(S): at 05:46

## 2019-09-12 RX ADMIN — Medication 81 MILLIGRAM(S): at 17:50

## 2019-09-12 RX ADMIN — PANTOPRAZOLE SODIUM 40 MILLIGRAM(S): 20 TABLET, DELAYED RELEASE ORAL at 05:46

## 2019-09-12 RX ADMIN — Medication 0.25 MILLIGRAM(S): at 05:45

## 2019-09-12 RX ADMIN — ATORVASTATIN CALCIUM 10 MILLIGRAM(S): 80 TABLET, FILM COATED ORAL at 22:02

## 2019-09-12 RX ADMIN — Medication 40 MILLIGRAM(S): at 05:46

## 2019-09-12 RX ADMIN — Medication 40 MILLIGRAM(S): at 05:47

## 2019-09-12 NOTE — PROGRESS NOTE ADULT - SUBJECTIVE AND OBJECTIVE BOX
SUBJECTIVE:    Patient is a 79y old Female who presents with a chief complaint of SOB (11 Sep 2019 17:04)    Currently admitted to medicine with the primary diagnosis of COPD exacerbation     Today is hospital day 6d. This morning she is resting comfortably in bed and reports no new issues or overnight events.     INTERVAL EVENTS: Patient had episode of disorientation at night and was talked down by the night resident possibly due to persistent steroid use. Otherwise breathing and affect is better    PAST MEDICAL & SURGICAL HISTORY  COPD without exacerbation  Afib      ALLERGIES:  No Known Allergies    MEDICATIONS:  STANDING MEDICATIONS  aspirin  chewable 81 milliGRAM(s) Oral daily  atorvastatin 10 milliGRAM(s) Oral at bedtime  chlorhexidine 4% Liquid 1 Application(s) Topical <User Schedule>  digoxin     Tablet 0.25 milliGRAM(s) Oral daily  diltiazem    milliGRAM(s) Oral daily  furosemide    Tablet 40 milliGRAM(s) Oral daily  lisinopril 40 milliGRAM(s) Oral daily  melatonin 5 milliGRAM(s) Oral at bedtime  pantoprazole    Tablet 40 milliGRAM(s) Oral before breakfast  predniSONE   Tablet 40 milliGRAM(s) Oral daily    PRN MEDICATIONS  ALBUTerol/ipratropium for Nebulization 3 milliLiter(s) Nebulizer every 6 hours PRN  guaiFENesin    Syrup 100 milliGRAM(s) Oral every 6 hours PRN  zolpidem 5 milliGRAM(s) Oral at bedtime PRN    VITALS:   T(F): 97.6  HR: 86  BP: 148/73  RR: 18  SpO2: --    LABS:                        12.4   9.86  )-----------( 352      ( 11 Sep 2019 07:29 )             41.2         139  |  92<L>  |  31<H>  ----------------------------<  138<H>  5.1<H>   |  34<H>  |  0.8    Ca    9.1      11 Sep 2019 07:29  Mg     2.2         TPro  6.5  /  Alb  4.0  /  TBili  0.3  /  DBili  x   /  AST  17  /  ALT  30  /  AlkPhos  83      PT/INR - ( 11 Sep 2019 17:06 )   PT: 27.60 sec;   INR: 2.42 ratio           Urinalysis Basic - ( 10 Sep 2019 21:30 )    Color: Light Yellow / Appearance: Clear / S.018 / pH: x  Gluc: x / Ketone: Negative  / Bili: Negative / Urobili: <2 mg/dL   Blood: x / Protein: Trace / Nitrite: Negative   Leuk Esterase: Negative / RBC: x / WBC x   Sq Epi: x / Non Sq Epi: x / Bacteria: x                RADIOLOGY:    PHYSICAL EXAM:  GEN: No acute distress, on 2L of oxygen comfortable  PULM/CHEST: Clear to auscultation bilaterally, no rales, rhonchi or wheezes   CVS: Regular rate and rhythm, S1-S2, no murmurs  ABD: Soft, non-tender, non-distended, +BS  EXT: No edema  NEURO: AAOx3 SUBJECTIVE:    Patient is a 79y old Female who presents with a chief complaint of SOB (11 Sep 2019 17:04)    Currently admitted to medicine with the primary diagnosis of COPD exacerbation     Today is hospital day 6d. This morning she is resting comfortably in bed and reports no new issues or overnight events.     INTERVAL EVENTS: Patient had episode of disorientation at night and was talked down by the night resident possibly due to persistent steroid use. Complains of worse breathing than yesterday.     PAST MEDICAL & SURGICAL HISTORY  COPD without exacerbation  Afib      ALLERGIES:  No Known Allergies    MEDICATIONS:  STANDING MEDICATIONS  aspirin  chewable 81 milliGRAM(s) Oral daily  atorvastatin 10 milliGRAM(s) Oral at bedtime  chlorhexidine 4% Liquid 1 Application(s) Topical <User Schedule>  digoxin     Tablet 0.25 milliGRAM(s) Oral daily  diltiazem    milliGRAM(s) Oral daily  furosemide    Tablet 40 milliGRAM(s) Oral daily  lisinopril 40 milliGRAM(s) Oral daily  melatonin 5 milliGRAM(s) Oral at bedtime  pantoprazole    Tablet 40 milliGRAM(s) Oral before breakfast  predniSONE   Tablet 40 milliGRAM(s) Oral daily    PRN MEDICATIONS  ALBUTerol/ipratropium for Nebulization 3 milliLiter(s) Nebulizer every 6 hours PRN  guaiFENesin    Syrup 100 milliGRAM(s) Oral every 6 hours PRN  zolpidem 5 milliGRAM(s) Oral at bedtime PRN    VITALS:   T(F): 97.6  HR: 86  BP: 148/73  RR: 18  SpO2: --    LABS:                        12.4   9.86  )-----------( 352      ( 11 Sep 2019 07:29 )             41.2         139  |  92<L>  |  31<H>  ----------------------------<  138<H>  5.1<H>   |  34<H>  |  0.8    Ca    9.1      11 Sep 2019 07:29  Mg     2.2         TPro  6.5  /  Alb  4.0  /  TBili  0.3  /  DBili  x   /  AST  17  /  ALT  30  /  AlkPhos  83      PT/INR - ( 11 Sep 2019 17:06 )   PT: 27.60 sec;   INR: 2.42 ratio           Urinalysis Basic - ( 10 Sep 2019 21:30 )    Color: Light Yellow / Appearance: Clear / S.018 / pH: x  Gluc: x / Ketone: Negative  / Bili: Negative / Urobili: <2 mg/dL   Blood: x / Protein: Trace / Nitrite: Negative   Leuk Esterase: Negative / RBC: x / WBC x   Sq Epi: x / Non Sq Epi: x / Bacteria: x                RADIOLOGY:    PHYSICAL EXAM:  GEN: No acute distress, on 2L of oxygen comfortable  PULM/CHEST: Bilateral wheeze  CVS: Regular rate and rhythm, S1-S2, no murmurs  ABD: Soft, non-tender, non-distended, +BS  EXT: No edema  NEURO: AAOx3

## 2019-09-12 NOTE — PHARMACOTHERAPY INTERVENTION NOTE - COMMENTS
Completed med rec with patient, using [facility] outpatient system medication list. I spoke to her about maintenance inhalers that she should be using everyday, and she said she is on Breo Ellipta at home. Her technique was appropriate. She said she doesn't like Breo because it causes her mouth to become very dry. She had not been rinsing her mouth afterwards. I counseled her to rinse mouth afterwards due to the steroid component. She demonstrated understanding of all instructions.

## 2019-09-12 NOTE — CHART NOTE - NSCHARTNOTEFT_GEN_A_CORE
Registered Dietitian Follow-Up     Patient Profile Reviewed                           Yes [x]   No []     Nutrition History Previously Obtained        Yes [x]  No []       Pertinent Subjective Information: Previously recommended supplement have not been ordered, however pt. with very good appetite at this time.      Pertinent Medical Interventions: COPD exacerbation? RLL Pneumonia vs fluid overload: on Lasix. Abnormal EKG. No CP- Nuclear Stress test today and was normal. Afib rate controlled.      Diet order: DASH/TLC, no conc K     Anthropometrics:  - Ht. 170.1cm  - Wt. 56kg on 9/10 vs. 48.8kg on 9/10- ? bed scale discrepancy, will continue ot monitor wt trends during LOS   - %wt change  - BMI 19.4- using most recent weight 56kg  - IBW 135lbs      Pertinent Lab Data: (9/12) WBC 11.25, BUN 34, glu 106, eGFR 54     Pertinent Meds: Prednisone, Lisinopril, Lasix, Protonix, Atorvastatin      Physical Findings:  - Appearance: alert&oriented   - GI function: no symptoms noted, last BM 9/11  - Tubes: none noted  - Oral/Mouth cavity: no symptoms noted  - Skin: intact (BS 19)      Nutrition Requirements (from RD note on 9/9)  Weight Used: 48.8kg     Estimated Energy Needs    Continue [x]  Adjust [] 1201-1501kcal (MSJ x 1.2-1.5 AF) for recent weight loss  Adjusted Energy Recommendations:   kcal/day        Estimated Protein Needs    Continue [x]  Adjust [] 54-63g (1.1-1.3g/kg CBW) as above  Adjusted Protein Recommendations:   gm/day        Estimated Fluid Needs        Continue [x]  Adjust [] 1ml/kcal or per LIP  Adjusted Fluid Recommendations:   mL/day     Nutrient Intake: 100% PO at meals         [] Previous Nutrition Diagnosis: Unintended Weight Loss            [] Ongoing          [] Resolved    No further weight loss noted. Will reassess in 7 days.      Nutrition Intervention: meals and snacks     Rec: Continue DASH/TLC, no conc K diet order      Goal/Expected Outcome: In 7 days pt. to continue to consume 100% PO at meals. Weight maintenance within 1-2kg.      Indicator/Monitoring: energy intake, body composition, NFPF, glucose profile Registered Dietitian Follow-Up     Patient Profile Reviewed                           Yes [x]   No []     Nutrition History Previously Obtained        Yes [x]  No []       Pertinent Subjective Information: Previously recommended supplement have not been ordered, however pt. with very good appetite at this time.      Pertinent Medical Interventions: COPD exacerbation? RLL Pneumonia vs fluid overload: on Lasix. Abnormal EKG. No CP- Nuclear Stress test today and was normal. Afib rate controlled.      Diet order: DASH/TLC, no conc K     Anthropometrics:  - Ht. 170.1cm  - Wt. 56kg on 9/10 vs. 48.8kg on 9/18- ? bed scale discrepancy, will continue ot monitor wt trends during LOS   - %wt change  - BMI 19.4- using most recent weight 56kg  - IBW 135lbs      Pertinent Lab Data: (9/12) WBC 11.25, BUN 34, glu 106, eGFR 54     Pertinent Meds: Prednisone, Lisinopril, Lasix, Protonix, Atorvastatin      Physical Findings:  - Appearance: alert&oriented   - GI function: no symptoms noted, last BM 9/11  - Tubes: none noted  - Oral/Mouth cavity: no symptoms noted  - Skin: intact (BS 19)      Nutrition Requirements (from RD note on 9/9)  Weight Used: 48.8kg     Estimated Energy Needs    Continue [x]  Adjust [] 1201-1501kcal (MSJ x 1.2-1.5 AF) for recent weight loss  Adjusted Energy Recommendations:   kcal/day        Estimated Protein Needs    Continue [x]  Adjust [] 54-63g (1.1-1.3g/kg CBW) as above  Adjusted Protein Recommendations:   gm/day        Estimated Fluid Needs        Continue [x]  Adjust [] 1ml/kcal or per LIP  Adjusted Fluid Recommendations:   mL/day     Nutrient Intake: 100% PO at meals         [] Previous Nutrition Diagnosis: Unintended Weight Loss            [] Ongoing          [] Resolved    No further weight loss noted. Will reassess in 7 days.      Nutrition Intervention: meals and snacks     Rec: Continue DASH/TLC, no conc K diet order      Goal/Expected Outcome: In 7 days pt. to continue to consume 100% PO at meals. Weight maintenance within 1-2kg.      Indicator/Monitoring: energy intake, body composition, NFPF, glucose profile

## 2019-09-12 NOTE — PROGRESS NOTE ADULT - ASSESSMENT
Acute COPD exacerbation ; improved   Right lower lobe pneumonia, likely gram negative. Sepsis ruled out on admission.   CXR showed right lower lung infiltrates   s/p IV rocephin 6 doses. 5 doses azithromycin   taoer steroids   will need home o2     chronic hypercapnic resp acidosis with metabolic compensation:    bicarp 39 hold lasix     lactic acid is 3.1 yesterday likely hypoperfusion asymptomatic repeat lactic acid today     Chronic Atrial fibrillation and NSVT on tele   Continue Digoxin, Cardizem and Coumadin. INR 2.31 y therapeutic. follow inr target 2-3 and dose coumadin  accordingly   Echo showed normal LVEF 50-55%, mild to mod MR  stress test done negative for ischemia   avoiding BB because of COPD exacerbation   per cardiology OPT follow up         HTN  better controlled   on lisinopril 40 qday and cardizem 360 qday   avoid bb for now patient with acute COPD       #Progress Note Handoff:  Pending (specify):anticipate discharge tomorrow. repeat lactic acid, follow bicarbonate   Family discussion: with patient and son at bedside   Disposition: Home with home with care. She will  need home O2.

## 2019-09-12 NOTE — PROGRESS NOTE ADULT - ASSESSMENT
79 year old female with a history of afib on coumadin, htn, hld, copd (not on home 02) presents here for worsening sob for the last several days.  On 09/10 Patients morning Xray looks better and she is clinically much better. Patients Nebs were switched to PRN and IV steroids for one more day.  Patient clinically better 09/11 and will underwent stress test today and was negative. Duplex scan negative.    Patient seen by cardio and they agree with continuing current management. Patient had a brief episode of disorientation last night but was able to be talked down possible steroid induced. Switched to oral from today. Patient stable now    # COPD exacerbation? RLL Pneumonia vs fluid overload   - D/C IV solumedrol 60 twice daily  - Will get oral Prednisone 40mg today  - nebs q6 PRN  - D/C ceftriaxone and azithromycin, got 5 and 6 doses respectively  - Speech and Swallow recommend Regular, thins diet  - Patient still on 2L of oxygen and likely will need home oxygen  - Desaturated to 86% without oxygen on ambulation  - Continue Lasix 40mg daily BNP is elevated, EF of 50-55% on echo  - keep I<O  - XRay Chest yesterday shows improvement  - Urine Strep and Legionella negative, Mycoplasma antibody titers sent    # Abnormal EKG. No CP  - T wave inversions seen  - Mild to moderate MR on echo and EF of 50-55%  - Nuclear Stress test today and was normal  - outpt f/u with Dr. Hairston    # Afib rate controlled   - Continue Cardizem 360 digoxin and coumadin 5 QD  - dig level 1.0  - INR 2.42  - duplex to rule out DVT    # Insomnia  - patient states that only ambient wrks but PMD refuses to prescribe in light of COPD and CO2 retention which is reasonable  - outpatient sleep specialist referral   - Patient sleeping well for last 2 days but stressed about the stress test didn't     # HTN  - Continue enalapril    # Hyperkalemia  - K of 5.1 today  - Put on K restricted diet    # DVT ppx on AC  # DASH diet   # FULL code   Dispo as per PT 79 year old female with a history of afib on coumadin, htn, hld, copd (not on home 02) presents here for worsening sob for the last several days.  On 09/10 Patients morning Xray looks better and she is clinically much better. Patients Nebs were switched to PRN and IV steroids for one more day.  Patient clinically better 09/11 and will underwent stress test today and was negative. Duplex scan negative.    Patient seen by cardio and they agree with continuing current management. Patient had a brief episode of disorientation last night but was able to be talked down possible steroid induced. Switched to oral from today. Patient stable now. Breathing worse compared to yesterday. ABGs done and CO2 was 39    # SOB due to COPD exacerbation vs fluid overload   - D/C IV solumedrol 60 twice daily  - Continue oral Prednisone 40mg today  - nebs q6 PRN  - D/C ceftriaxone and azithromycin, got 5 and 6 doses respectively  - Speech and Swallow recommend Regular, thins diet  - Patient still on 2L of oxygen and will need home oxygen  - Desaturated to 86% without oxygen on ambulation  - D/C Lasix 40mg daily   - EF of 50-55% on echo  - keep I<O  - Urine Strep and Legionella negative, Mycoplasma antibody titers sent    # Abnormal EKG. No CP  - T wave inversions seen  - Mild to moderate MR on echo and EF of 50-55%  - Nuclear Stress test today and was normal  - outpatient f/u with Dr. Hairston    # Afib rate controlled   - Continue Cardizem 360 digoxin and coumadin 4mg once daily  - dig level 1.0  - INR 2.31  - duplex to rule out DVT    # Insomnia  - patient states that only ambient wrks but PMD refuses to prescribe in light of COPD and CO2 retention which is reasonable  - outpatient sleep specialist referral   - Patient slept well last night and had just had one bad night during the stay when she was due for the stress test    # HTN  - Continue enalapril    # Hyperkalemia  - K of 4.7 today  - Continue on K restricted diet    # DVT ppx on AC  # DASH diet   # FULL code   Dispo as per PT

## 2019-09-12 NOTE — PROGRESS NOTE ADULT - SUBJECTIVE AND OBJECTIVE BOX
no chest pain   SOB better     Vital Signs Last 24 Hrs  T(C): 36.9 (12 Sep 2019 12:55), Max: 36.9 (12 Sep 2019 12:55)  T(F): 98.4 (12 Sep 2019 12:55), Max: 98.4 (12 Sep 2019 12:55)  HR: 82 (12 Sep 2019 12:55) (82 - 98)  BP: 151/67 (12 Sep 2019 12:55) (148/73 - 188/72)  BP(mean): --  RR: 18 (12 Sep 2019 12:55) (18 - 20)  SpO2: --    PHYSICAL EXAM:  GENERAL: NAD,  CHEST/LUNG: Clear to auscultation bilaterally; No wheeze   CV Regular rate and rhythm; No murmurs, rubs, or gallops  GI: Soft, Nontender, Nondistended; Bowel sounds present  EXTREMITIES:  2+ Peripheral Pulses, No clubbing, cyanosis, or edema  PSYCH: AAOx3  NEUROLOGY: non-focal  SKIN: No rashes or lesions                          12.2   11.25 )-----------( 355      ( 12 Sep 2019 06:02 )             40.6     09-12    138  |  88<L>  |  30<H>  ----------------------------<  168<H>  4.7   |  39<H>  |  0.9    Ca    8.7      12 Sep 2019 11:37  Phos  3.2     09-12  Mg     2.2     09-12    TPro  5.8<L>  /  Alb  3.5  /  TBili  0.4  /  DBili  x   /  AST  17  /  ALT  27  /  AlkPhos  71  09-12    LIVER FUNCTIONS - ( 12 Sep 2019 06:02 )  Alb: 3.5 g/dL / Pro: 5.8 g/dL / ALK PHOS: 71 U/L / ALT: 27 U/L / AST: 17 U/L / GGT: x           PT/INR - ( 12 Sep 2019 06:02 )   PT: 26.40 sec;   INR: 2.31 ratio               MEDICATIONS  (STANDING):  aspirin  chewable 81 milliGRAM(s) Oral daily  atorvastatin 10 milliGRAM(s) Oral at bedtime  chlorhexidine 4% Liquid 1 Application(s) Topical <User Schedule>  digoxin     Tablet 0.25 milliGRAM(s) Oral daily  diltiazem    milliGRAM(s) Oral daily  lisinopril 40 milliGRAM(s) Oral daily  melatonin 5 milliGRAM(s) Oral at bedtime  pantoprazole    Tablet 40 milliGRAM(s) Oral before breakfast  predniSONE   Tablet 40 milliGRAM(s) Oral daily    MEDICATIONS  (PRN):  ALBUTerol/ipratropium for Nebulization 3 milliLiter(s) Nebulizer every 6 hours PRN Shortness of Breath and/or Wheezing  guaiFENesin    Syrup 100 milliGRAM(s) Oral every 6 hours PRN Cough  zolpidem 5 milliGRAM(s) Oral at bedtime PRN Insomnia

## 2019-09-13 ENCOUNTER — TRANSCRIPTION ENCOUNTER (OUTPATIENT)
Age: 79
End: 2019-09-13

## 2019-09-13 VITALS
DIASTOLIC BLOOD PRESSURE: 72 MMHG | TEMPERATURE: 98 F | SYSTOLIC BLOOD PRESSURE: 169 MMHG | RESPIRATION RATE: 18 BRPM | HEART RATE: 83 BPM

## 2019-09-13 LAB
ALBUMIN SERPL ELPH-MCNC: 3.1 G/DL — LOW (ref 3.5–5.2)
ALP SERPL-CCNC: 67 U/L — SIGNIFICANT CHANGE UP (ref 30–115)
ALT FLD-CCNC: 25 U/L — SIGNIFICANT CHANGE UP (ref 0–41)
ANION GAP SERPL CALC-SCNC: 7 MMOL/L — SIGNIFICANT CHANGE UP (ref 7–14)
APTT BLD: 32.9 SEC — SIGNIFICANT CHANGE UP (ref 27–39.2)
AST SERPL-CCNC: 16 U/L — SIGNIFICANT CHANGE UP (ref 0–41)
BILIRUB SERPL-MCNC: 0.3 MG/DL — SIGNIFICANT CHANGE UP (ref 0.2–1.2)
BUN SERPL-MCNC: 30 MG/DL — HIGH (ref 10–20)
CALCIUM SERPL-MCNC: 8.6 MG/DL — SIGNIFICANT CHANGE UP (ref 8.5–10.1)
CHLORIDE SERPL-SCNC: 94 MMOL/L — LOW (ref 98–110)
CO2 SERPL-SCNC: 40 MMOL/L — HIGH (ref 17–32)
CREAT SERPL-MCNC: 0.7 MG/DL — SIGNIFICANT CHANGE UP (ref 0.7–1.5)
GLUCOSE SERPL-MCNC: 78 MG/DL — SIGNIFICANT CHANGE UP (ref 70–99)
HCT VFR BLD CALC: 39.3 % — SIGNIFICANT CHANGE UP (ref 37–47)
HGB BLD-MCNC: 11.7 G/DL — LOW (ref 12–16)
INR BLD: 2.15 RATIO — HIGH (ref 0.65–1.3)
LACTATE SERPL-SCNC: 1.2 MMOL/L — SIGNIFICANT CHANGE UP (ref 0.5–2.2)
MAGNESIUM SERPL-MCNC: 2.1 MG/DL — SIGNIFICANT CHANGE UP (ref 1.8–2.4)
MCHC RBC-ENTMCNC: 26.1 PG — LOW (ref 27–31)
MCHC RBC-ENTMCNC: 29.8 G/DL — LOW (ref 32–37)
MCV RBC AUTO: 87.7 FL — SIGNIFICANT CHANGE UP (ref 81–99)
NRBC # BLD: 0 /100 WBCS — SIGNIFICANT CHANGE UP (ref 0–0)
PLATELET # BLD AUTO: 317 K/UL — SIGNIFICANT CHANGE UP (ref 130–400)
POTASSIUM SERPL-MCNC: 4.6 MMOL/L — SIGNIFICANT CHANGE UP (ref 3.5–5)
POTASSIUM SERPL-SCNC: 4.6 MMOL/L — SIGNIFICANT CHANGE UP (ref 3.5–5)
PROT SERPL-MCNC: 5.1 G/DL — LOW (ref 6–8)
PROTHROM AB SERPL-ACNC: 24.5 SEC — HIGH (ref 9.95–12.87)
RBC # BLD: 4.48 M/UL — SIGNIFICANT CHANGE UP (ref 4.2–5.4)
RBC # FLD: 14.6 % — HIGH (ref 11.5–14.5)
SODIUM SERPL-SCNC: 141 MMOL/L — SIGNIFICANT CHANGE UP (ref 135–146)
WBC # BLD: 10.62 K/UL — SIGNIFICANT CHANGE UP (ref 4.8–10.8)
WBC # FLD AUTO: 10.62 K/UL — SIGNIFICANT CHANGE UP (ref 4.8–10.8)

## 2019-09-13 PROCEDURE — 99239 HOSP IP/OBS DSCHRG MGMT >30: CPT

## 2019-09-13 RX ORDER — DIGOXIN 250 MCG
1 TABLET ORAL
Qty: 0 | Refills: 0 | DISCHARGE

## 2019-09-13 RX ORDER — DILTIAZEM HCL 120 MG
1 CAPSULE, EXT RELEASE 24 HR ORAL
Qty: 30 | Refills: 0
Start: 2019-09-13 | End: 2019-10-12

## 2019-09-13 RX ORDER — DIGOXIN 250 MCG
1 TABLET ORAL
Qty: 30 | Refills: 0
Start: 2019-09-13 | End: 2019-10-12

## 2019-09-13 RX ORDER — ALBUTEROL 90 UG/1
2 AEROSOL, METERED ORAL
Qty: 1 | Refills: 0
Start: 2019-09-13 | End: 2019-10-12

## 2019-09-13 RX ORDER — LISINOPRIL 2.5 MG/1
1 TABLET ORAL
Qty: 30 | Refills: 0
Start: 2019-09-13 | End: 2019-10-12

## 2019-09-13 RX ORDER — MOMETASONE FUROATE AND FORMOTEROL FUMARATE DIHYDRATE 200; 5 UG/1; UG/1
2 AEROSOL RESPIRATORY (INHALATION)
Qty: 0 | Refills: 0 | DISCHARGE

## 2019-09-13 RX ORDER — ATORVASTATIN CALCIUM 80 MG/1
1 TABLET, FILM COATED ORAL
Qty: 30 | Refills: 0
Start: 2019-09-13 | End: 2019-10-12

## 2019-09-13 RX ORDER — DILTIAZEM HCL 120 MG
1 CAPSULE, EXT RELEASE 24 HR ORAL
Qty: 0 | Refills: 0 | DISCHARGE

## 2019-09-13 RX ADMIN — Medication 360 MILLIGRAM(S): at 06:02

## 2019-09-13 RX ADMIN — LISINOPRIL 40 MILLIGRAM(S): 2.5 TABLET ORAL at 06:02

## 2019-09-13 RX ADMIN — Medication 81 MILLIGRAM(S): at 12:17

## 2019-09-13 RX ADMIN — Medication 40 MILLIGRAM(S): at 06:02

## 2019-09-13 RX ADMIN — ZOLPIDEM TARTRATE 5 MILLIGRAM(S): 10 TABLET ORAL at 00:07

## 2019-09-13 RX ADMIN — Medication 0.25 MILLIGRAM(S): at 06:02

## 2019-09-13 RX ADMIN — CHLORHEXIDINE GLUCONATE 1 APPLICATION(S): 213 SOLUTION TOPICAL at 06:01

## 2019-09-13 RX ADMIN — PANTOPRAZOLE SODIUM 40 MILLIGRAM(S): 20 TABLET, DELAYED RELEASE ORAL at 06:02

## 2019-09-13 NOTE — DISCHARGE NOTE PROVIDER - CARE PROVIDER_API CALL
Nirmal Guadarrama)  Internal Medicine; Pulmonary Disease  [address] [place] [address], [place] [address]  Phone: [phone]  Fax: [phone]  Follow Up Time: 1 week

## 2019-09-13 NOTE — DISCHARGE NOTE PROVIDER - HOSPITAL COURSE
HPI:    79 year old female with a history of afib on coumadin, HTN, HLD, COPD (not on home 02) presents here for worsening sob for 5 days duration.      patient was doing well until 4 days prior to presentation when she started productive cough and worsening SOB, with no fever or chills, no sick contact,     Patient denies cp n/v abdominal pain, urinary or symptoms     in ed:    T(C): 36.8 (06 Sep 2019 20:00), Max: 36.8 (06 Sep 2019 20:00)    T(F): 98.2 (06 Sep 2019 20:00), Max: 98.2 (06 Sep 2019 20:00)    HR: 110 (06 Sep 2019 20:00) (101 - 120)    BP: 128/81 (06 Sep 2019 20:00) (128/81 - 203/87)    RR: 20 (06 Sep 2019 20:00) (20 - 22)    SpO2: 95% (06 Sep 2019 20:00) (89% - 95%)    CXR showed RLL opacity, with no leukocytosis, received Rocephin and azithromycin (06 Sep 2019 20:15)        Patient was treated for the diagnosis of Acute COPD exacerbation and was treated with IV solumedrol 60 twice daily later changed to oral Prednisone 40mg. Patient got nebs q6 PRN    and got ceftriaxone and azithromycin for 5 and 6 days respectively. Patient was also seen by speech and swallow who cleared for Regular diet. Patient was kept on telemetry due to Afib rate controlled with Cardizem 360 digoxin and coumadin 4mg once daily, duplex was done to rule out DVT. Patient was kept on 2L of oxygen and will need home oxygen. Pulmonology saw the patient and recommended followup in one week. For EKG changes on admission patient was seen by cardiologist and got a stress test which was negative. Patient was given lasix for fluid overload and was discontinued once the patients condition improved. Patient was monitored with regular xrays and ABGs and her condition was stable. Patient will be discharged today.

## 2019-09-13 NOTE — DISCHARGE NOTE PROVIDER - NSDCCPCAREPLAN_GEN_ALL_CORE_FT
PRINCIPAL DISCHARGE DIAGNOSIS  Diagnosis: COPD exacerbation  Assessment and Plan of Treatment: You presented to ED for worsening sob for 5 days duration.    In ED CXR showed RLL opacity, with no leukocytosis, you received Rocephin and azithromycin   You were treated for the diagnosis of Acute COPD exacerbation and were treated with IV solumedrol 60 twice daily later changed to oral Prednisone 40mg. You got nebulizers q6 PRN and got ceftriaxone and azithromycin for 5 and 6 days respectively. You were kept on 2L of oxygen and will need home oxygen. Pulmonology consulted and recommended followup in one week. You were given lasix for fluid overload and was discontinued once the patients your condition was stable. You will be discharged today.      SECONDARY DISCHARGE DIAGNOSES  Diagnosis: Abnormal EKG  Assessment and Plan of Treatment: For EKG changes on admission you were seen by cardiologist and got a stress test which was negative. Cardiology was consulted and they wanted to continue with COPD management    Diagnosis: Atrial fibrillation with RVR  Assessment and Plan of Treatment: Patient was kept on telemetry due to Afib rate controlled with Cardizem 360 digoxin and coumadin 4mg once daily, duplex was done to rule out DVT. Your Heart rate remained stable

## 2019-09-13 NOTE — PROGRESS NOTE ADULT - ASSESSMENT
Acute COPD exacerbation ; improved   Right lower lobe pneumonia, likely gram negative. Sepsis ruled out on admission.   CXR showed right lower lung infiltrates   s/p IV rocephin 6 doses. 5 doses azithromycin   taoer steroids   going home with oxygen   resume home inhalers     no need for lasix there is no evidence of CHF     lactic acid was  3.1 likely  hypoperfusion asymptomatic repeat lactic acid today     Chronic Atrial fibrillation and NSVT on tele   Continue Digoxin, Cardizem and Coumadin. INR 2.31 y therapeutic. follow inr target 2-3 and dose coumadin  accordingly   Echo showed normal LVEF 50-55%, mild to mod MR  stress test done negative for ischemia   avoiding BB because of COPD exacerbation   per cardiology OPT follow up         HTN  better controlled   on lisinopril 40 qday and cardizem 360 qday   avoid bb for now patient with acute COPD       discharge today spent 32 min on discharge

## 2019-09-13 NOTE — CHART NOTE - NSCHARTNOTEFT_GEN_A_CORE
<<<RESIDENT DISCHARGE NOTE>>>     GENIE ROSENBERG  MRN-[id number]    VITAL SIGNS:  T(F): 97.6 (09-13-19 @ 12:50), Max: 98.4 (09-12-19 @ 20:16)  HR: 83 (09-13-19 @ 12:50)  BP: 169/72 (09-13-19 @ 12:50)  SpO2: 95% (09-13-19 @ 05:58)      PHYSICAL EXAMINATION:  GEN: No acute distress, on 2L of oxygen comfortable  PULM/CHEST: mild bilateral wheeze  CVS: Regular rate and rhythm, S1-S2, no murmurs  ABD: Soft, non-tender, non-distended, +BS  EXT: No edema  NEURO: AAOx3  TEST RESULTS:                        11.7   10.62 )-----------( 317      ( 13 Sep 2019 05:28 )             39.3       09-13    141  |  94<L>  |  30<H>  ----------------------------<  78  4.6   |  40<H>  |  0.7    Ca    8.6      13 Sep 2019 05:28  Phos  3.2     09-12  Mg     2.1     09-13    TPro  5.1<L>  /  Alb  3.1<L>  /  TBili  0.3  /  DBili  x   /  AST  16  /  ALT  25  /  AlkPhos  67  09-13      FINAL DISCHARGE INTERVIEW:  Resident(s) Present: (Name: Kurt West, RN Present: (Name:  Geovanna)    DISCHARGE MEDICATION RECONCILIATION  reviewed with Attending (Name: Francine Peraza)    DISPOSITION:   [  ] Home,    [  ] Home with Visiting Nursing Services,   [    ]  SNF/ NH,    [   ] Acute Rehab (4A),   [   ] Other (Specify:_________) <<<RESIDENT DISCHARGE NOTE>>>     GENIE ROSENBERG  MRN-[id number]    VITAL SIGNS:  T(F): 97.6 (09-13-19 @ 12:50), Max: 98.4 (09-12-19 @ 20:16)  HR: 83 (09-13-19 @ 12:50)  BP: 169/72 (09-13-19 @ 12:50)  SpO2: 95% (09-13-19 @ 05:58)      PHYSICAL EXAMINATION:  GEN: No acute distress, on 2L of oxygen comfortable  PULM/CHEST: mild bilateral wheeze  CVS: Regular rate and rhythm, S1-S2, no murmurs  ABD: Soft, non-tender, non-distended, +BS  EXT: No edema  NEURO: AAOx3  TEST RESULTS:                        11.7   10.62 )-----------( 317      ( 13 Sep 2019 05:28 )             39.3       09-13    141  |  94<L>  |  30<H>  ----------------------------<  78  4.6   |  40<H>  |  0.7    Ca    8.6      13 Sep 2019 05:28  Phos  3.2     09-12  Mg     2.1     09-13    TPro  5.1<L>  /  Alb  3.1<L>  /  TBili  0.3  /  DBili  x   /  AST  16  /  ALT  25  /  AlkPhos  67  09-13      FINAL DISCHARGE INTERVIEW:  Resident(s) Present: (Name: Kurt West, RN Present: (Name:  Geovanna)    DISCHARGE MEDICATION RECONCILIATION  reviewed with Attending (Name: Francine Peraza)    DISPOSITION:   [  ] Home,    [ x ] Home with Visiting Nursing Services,   [    ]  SNF/ NH,    [   ] Acute Rehab (4A),   [   ] Other (Specify:_________)

## 2019-09-13 NOTE — DISCHARGE NOTE PROVIDER - NSDCFUADDINST_GEN_ALL_CORE_FT
Please use the home oxygen and take your medications regularly. Please follow up with your appointment with Dr. Guadarrama within one week. In case of any worsening breathing please refer to your doctor

## 2019-09-13 NOTE — CONSULT NOTE ADULT - SUBJECTIVE AND OBJECTIVE BOX
Patient is a 79y old  Female who presents with a chief complaint of SOB (12 Sep 2019 13:14)      HPI:  79 year old female with a history of afib on coumadin, HTN, HLD, COPD (not on home 02) presents here for worsening sob for 5 days duration.    patient was doing well until 4 days prior to presentation when she started productive cough and worsening SOB, with no fever or chills, no sick contact,   Patient denies cp n/v abdominal pain, urinary or symptoms   in ed:  T(C): 36.8 (06 Sep 2019 20:00), Max: 36.8 (06 Sep 2019 20:00)  T(F): 98.2 (06 Sep 2019 20:00), Max: 98.2 (06 Sep 2019 20:00)  HR: 110 (06 Sep 2019 20:00) (101 - 120)  BP: 128/81 (06 Sep 2019 20:00) (128/81 - 203/87)  RR: 20 (06 Sep 2019 20:00) (20 - 22)  SpO2: 95% (06 Sep 2019 20:00) (89% - 95%)  CXR showed RLL opacity, with no leukocytosis, received Rocephin and azithromycin (06 Sep 2019 20:15)      PAST MEDICAL & SURGICAL HISTORY:  COPD without exacerbation  Afib      SOCIAL HX:   former smoker    FAMILY HISTORY:  FH: HTN (hypertension)  .  No cardiovascular or pulmonary family history     Review of System:  See HPI    Allergies    No Known Allergies    Intolerances          PHYSICAL EXAM  Vital Signs Last 24 Hrs  T(C): 36 (13 Sep 2019 05:58), Max: 36.9 (12 Sep 2019 12:55)  T(F): 96.8 (13 Sep 2019 05:58), Max: 98.4 (12 Sep 2019 12:55)  HR: 82 (13 Sep 2019 05:58) (82 - 85)  BP: 144/72 (13 Sep 2019 05:58) (144/72 - 184/74)  BP(mean): --  RR: 18 (13 Sep 2019 05:58) (16 - 18)  SpO2: 95% (13 Sep 2019 05:58) (95% - 95%)    General: In NAD  HEENT: TARA             Lymphatic system: No cervical LN     Lungs: clear to auscultation over bilateral lung fields; no wheezing  Cardiovascular: Regular  Gastrointestinal: Soft.  + BS   Musculoskeletal: No Clubbing.  Full range of motion.. Moves all extremities  Skin: Warm.  Intact  Neurological: No motor or sensory deficit       LABS:                          11.7   10.62 )-----------( 317      ( 13 Sep 2019 05:28 )             39.3                                               09-13    141  |  94<L>  |  30<H>  ----------------------------<  78  4.6   |  40<H>  |  0.7    Ca    8.6      13 Sep 2019 05:28  Phos  3.2     09-12  Mg     2.1     09-13    TPro  5.1<L>  /  Alb  3.1<L>  /  TBili  0.3  /  DBili  x   /  AST  16  /  ALT  25  /  AlkPhos  67  09-13      PT/INR - ( 13 Sep 2019 05:28 )   PT: 24.50 sec;   INR: 2.15 ratio         PTT - ( 13 Sep 2019 05:28 )  PTT:32.9 sec                                                                                     LIVER FUNCTIONS - ( 13 Sep 2019 05:28 )  Alb: 3.1 g/dL / Pro: 5.1 g/dL / ALK PHOS: 67 U/L / ALT: 25 U/L / AST: 16 U/L / GGT: x                                                                                            ABG - ( 12 Sep 2019 10:23 )  pH, Arterial: 7.42  pH, Blood: x     /  pCO2: 70    /  pO2: 97    / HCO3: 46    / Base Excess: 17.6  /  SaO2: 98                  MEDICATIONS  (STANDING):  aspirin  chewable 81 milliGRAM(s) Oral daily  atorvastatin 10 milliGRAM(s) Oral at bedtime  chlorhexidine 4% Liquid 1 Application(s) Topical <User Schedule>  digoxin     Tablet 0.25 milliGRAM(s) Oral daily  diltiazem    milliGRAM(s) Oral daily  lisinopril 40 milliGRAM(s) Oral daily  melatonin 5 milliGRAM(s) Oral at bedtime  pantoprazole    Tablet 40 milliGRAM(s) Oral before breakfast  predniSONE   Tablet 40 milliGRAM(s) Oral daily  warfarin 4 milliGRAM(s) Oral at bedtime    MEDICATIONS  (PRN):  ALBUTerol/ipratropium for Nebulization 3 milliLiter(s) Nebulizer every 6 hours PRN Shortness of Breath and/or Wheezing  guaiFENesin    Syrup 100 milliGRAM(s) Oral every 6 hours PRN Cough  zolpidem 5 milliGRAM(s) Oral at bedtime PRN Insomnia

## 2019-09-13 NOTE — PROGRESS NOTE ADULT - SUBJECTIVE AND OBJECTIVE BOX
no sob   no chest pain     Vital Signs Last 24 Hrs  T(C): 36 (13 Sep 2019 05:58), Max: 36.9 (12 Sep 2019 12:55)  T(F): 96.8 (13 Sep 2019 05:58), Max: 98.4 (12 Sep 2019 12:55)  HR: 82 (13 Sep 2019 05:58) (82 - 85)  BP: 144/72 (13 Sep 2019 05:58) (144/72 - 184/74)  BP(mean): --  RR: 18 (13 Sep 2019 05:58) (16 - 18)  SpO2: 95% (13 Sep 2019 05:58) (95% - 95%)    PHYSICAL EXAM:  GENERAL: NAD,   CHEST/LUNG: Clear to auscultation bilaterally; No wheeze  HEART: Regular rate and rhythm; No murmurs, rubs, or gallops  ABDOMEN: Soft, Nontender, Nondistended; Bowel sounds present  EXTREMITIES:  2+ Peripheral Pulses, No clubbing, cyanosis, or edema  PSYCH: AAOx3  NEUROLOGY: non-focal  SKIN: No rashes or lesions                          11.7   10.62 )-----------( 317      ( 13 Sep 2019 05:28 )             39.3     09-13    141  |  94<L>  |  30<H>  ----------------------------<  78  4.6   |  40<H>  |  0.7    Ca    8.6      13 Sep 2019 05:28  Phos  3.2     09-12  Mg     2.1     09-13    TPro  5.1<L>  /  Alb  3.1<L>  /  TBili  0.3  /  DBili  x   /  AST  16  /  ALT  25  /  AlkPhos  67  09-13    LIVER FUNCTIONS - ( 13 Sep 2019 05:28 )  Alb: 3.1 g/dL / Pro: 5.1 g/dL / ALK PHOS: 67 U/L / ALT: 25 U/L / AST: 16 U/L / GGT: x           PT/INR - ( 13 Sep 2019 05:28 )   PT: 24.50 sec;   INR: 2.15 ratio         PTT - ( 13 Sep 2019 05:28 )  PTT:32.9 sec      MEDICATIONS  (STANDING):  aspirin  chewable 81 milliGRAM(s) Oral daily  atorvastatin 10 milliGRAM(s) Oral at bedtime  chlorhexidine 4% Liquid 1 Application(s) Topical <User Schedule>  digoxin     Tablet 0.25 milliGRAM(s) Oral daily  diltiazem    milliGRAM(s) Oral daily  lisinopril 40 milliGRAM(s) Oral daily  melatonin 5 milliGRAM(s) Oral at bedtime  pantoprazole    Tablet 40 milliGRAM(s) Oral before breakfast  predniSONE   Tablet 40 milliGRAM(s) Oral daily  warfarin 4 milliGRAM(s) Oral at bedtime    MEDICATIONS  (PRN):  ALBUTerol/ipratropium for Nebulization 3 milliLiter(s) Nebulizer every 6 hours PRN Shortness of Breath and/or Wheezing  guaiFENesin    Syrup 100 milliGRAM(s) Oral every 6 hours PRN Cough  zolpidem 5 milliGRAM(s) Oral at bedtime PRN Insomnia

## 2019-09-13 NOTE — CONSULT NOTE ADULT - ASSESSMENT
IMPRESSION:  Acute exacerbation of chronic obstructive pulmonary disease  RLL pneumonia  Afib on anticoagulant    Plan:  Prednisone 40mg for total of 5 days  Continue inhaler therapy  Check pulse ox on RA and ambulation  May need home O2  HOB>45  OOB to chair  outpatient pulm follow up

## 2019-09-16 ENCOUNTER — APPOINTMENT (OUTPATIENT)
Dept: CARDIOLOGY | Facility: CLINIC | Age: 79
End: 2019-09-16
Payer: MEDICARE

## 2019-09-16 VITALS — HEART RATE: 68 BPM | SYSTOLIC BLOOD PRESSURE: 130 MMHG | RESPIRATION RATE: 18 BRPM | DIASTOLIC BLOOD PRESSURE: 70 MMHG

## 2019-09-16 VITALS — WEIGHT: 126 LBS | BODY MASS INDEX: 19.78 KG/M2 | HEIGHT: 67 IN

## 2019-09-16 DIAGNOSIS — E78.5 HYPERLIPIDEMIA, UNSPECIFIED: ICD-10-CM

## 2019-09-16 DIAGNOSIS — R73.03 PREDIABETES.: ICD-10-CM

## 2019-09-16 DIAGNOSIS — I34.0 NONRHEUMATIC MITRAL (VALVE) INSUFFICIENCY: ICD-10-CM

## 2019-09-16 DIAGNOSIS — I10 ESSENTIAL (PRIMARY) HYPERTENSION: ICD-10-CM

## 2019-09-16 PROBLEM — I48.91 UNSPECIFIED ATRIAL FIBRILLATION: Chronic | Status: ACTIVE | Noted: 2019-09-06

## 2019-09-16 PROBLEM — J44.9 CHRONIC OBSTRUCTIVE PULMONARY DISEASE, UNSPECIFIED: Chronic | Status: ACTIVE | Noted: 2019-09-06

## 2019-09-16 PROCEDURE — 93000 ELECTROCARDIOGRAM COMPLETE: CPT

## 2019-09-16 PROCEDURE — 99214 OFFICE O/P EST MOD 30 MIN: CPT

## 2019-09-16 NOTE — PHYSICAL EXAM
[Normal Appearance] : normal appearance [General Appearance - Well Developed] : well developed [Well Groomed] : well groomed [No Deformities] : no deformities [General Appearance - Well Nourished] : well nourished [Normal Conjunctiva] : the conjunctiva exhibited no abnormalities [General Appearance - In No Acute Distress] : no acute distress [Eyelids - No Xanthelasma] : the eyelids demonstrated no xanthelasmas [Normal Oral Mucosa] : normal oral mucosa [No Oral Pallor] : no oral pallor [No Oral Cyanosis] : no oral cyanosis [Normal Jugular Venous A Waves Present] : normal jugular venous A waves present [No Jugular Venous Monreal A Waves] : no jugular venous monreal A waves [Normal Jugular Venous V Waves Present] : normal jugular venous V waves present [Heart Rate And Rhythm] : heart rate and rhythm were normal [Irregularly Irregular] : the rhythm was irregularly irregular [Systolic grade ___/6] : A grade [unfilled]/6 systolic murmur was heard. [Respiration, Rhythm And Depth] : normal respiratory rhythm and effort [Exaggerated Use Of Accessory Muscles For Inspiration] : no accessory muscle use [Bowel Sounds] : normal bowel sounds [FreeTextEntry1] : expiratory wheezes   [Abdomen Soft] : soft [Abdomen Tenderness] : non-tender [Abdomen Mass (___ Cm)] : no abdominal mass palpated [Abnormal Walk] : normal gait [Nail Clubbing] : no clubbing of the fingernails [Cyanosis, Localized] : no localized cyanosis [Petechial Hemorrhages (___cm)] : no petechial hemorrhages [] : no rash [Skin Color & Pigmentation] : normal skin color and pigmentation [No Venous Stasis] : no venous stasis [No Skin Ulcers] : no skin ulcer [Skin Lesions] : no skin lesions [Oriented To Time, Place, And Person] : oriented to person, place, and time [No Xanthoma] : no  xanthoma was observed

## 2019-09-17 LAB
M PNEUMO IGG SER IA-ACNC: 1.49 INDEX — HIGH
M PNEUMO IGG SER IA-ACNC: POSITIVE
M PNEUMO IGM SER-ACNC: 99 UNITS/ML — SIGNIFICANT CHANGE UP
MYCOPLASMA AG SPEC QL: NEGATIVE — SIGNIFICANT CHANGE UP

## 2019-09-17 RX ORDER — PRAVASTATIN SODIUM 40 MG/1
40 TABLET ORAL
Qty: 90 | Refills: 0 | Status: ACTIVE | COMMUNITY
Start: 2017-08-14

## 2019-09-17 RX ORDER — DILTIAZEM HYDROCHLORIDE 360 MG/1
360 CAPSULE, EXTENDED RELEASE ORAL
Qty: 90 | Refills: 0 | Status: DISCONTINUED | COMMUNITY
Start: 2017-08-14 | End: 2019-09-17

## 2019-09-19 ENCOUNTER — OUTPATIENT (OUTPATIENT)
Dept: OUTPATIENT SERVICES | Facility: HOSPITAL | Age: 79
LOS: 1 days | Discharge: HOME | End: 2019-09-19

## 2019-09-19 DIAGNOSIS — I48.91 UNSPECIFIED ATRIAL FIBRILLATION: ICD-10-CM

## 2019-09-19 DIAGNOSIS — Z79.01 LONG TERM (CURRENT) USE OF ANTICOAGULANTS: ICD-10-CM

## 2019-09-19 LAB
POCT INR: 1.7 RATIO — HIGH (ref 0.9–1.2)
POCT PT: 20.6 SEC — HIGH (ref 10–13.4)

## 2019-10-15 ENCOUNTER — OUTPATIENT (OUTPATIENT)
Dept: OUTPATIENT SERVICES | Facility: HOSPITAL | Age: 79
LOS: 1 days | Discharge: HOME | End: 2019-10-15

## 2019-10-15 DIAGNOSIS — Z79.01 LONG TERM (CURRENT) USE OF ANTICOAGULANTS: ICD-10-CM

## 2019-10-15 DIAGNOSIS — I48.91 UNSPECIFIED ATRIAL FIBRILLATION: ICD-10-CM

## 2019-10-15 LAB
POCT INR: 1.5 RATIO — HIGH (ref 0.9–1.2)
POCT PT: 18.1 SEC — HIGH (ref 10–13.4)

## 2019-10-21 ENCOUNTER — OUTPATIENT (OUTPATIENT)
Dept: OUTPATIENT SERVICES | Facility: HOSPITAL | Age: 79
LOS: 1 days | Discharge: HOME | End: 2019-10-21

## 2019-10-21 DIAGNOSIS — Z79.01 LONG TERM (CURRENT) USE OF ANTICOAGULANTS: ICD-10-CM

## 2019-10-21 DIAGNOSIS — I48.91 UNSPECIFIED ATRIAL FIBRILLATION: ICD-10-CM

## 2019-10-30 ENCOUNTER — OUTPATIENT (OUTPATIENT)
Dept: OUTPATIENT SERVICES | Facility: HOSPITAL | Age: 79
LOS: 1 days | Discharge: HOME | End: 2019-10-30

## 2019-10-30 DIAGNOSIS — I48.91 UNSPECIFIED ATRIAL FIBRILLATION: ICD-10-CM

## 2019-10-30 DIAGNOSIS — Z79.01 LONG TERM (CURRENT) USE OF ANTICOAGULANTS: ICD-10-CM

## 2019-10-30 LAB
POCT INR: 1.4 RATIO — HIGH (ref 0.9–1.2)
POCT PT: 16.9 SEC — HIGH (ref 10–13.4)

## 2019-10-31 ENCOUNTER — OUTPATIENT (OUTPATIENT)
Dept: OUTPATIENT SERVICES | Facility: HOSPITAL | Age: 79
LOS: 1 days | Discharge: HOME | End: 2019-10-31
Payer: MEDICARE

## 2019-10-31 ENCOUNTER — TRANSCRIPTION ENCOUNTER (OUTPATIENT)
Age: 79
End: 2019-10-31

## 2019-10-31 ENCOUNTER — RESULT REVIEW (OUTPATIENT)
Age: 79
End: 2019-10-31

## 2019-10-31 VITALS
WEIGHT: 123.9 LBS | HEART RATE: 95 BPM | DIASTOLIC BLOOD PRESSURE: 74 MMHG | SYSTOLIC BLOOD PRESSURE: 124 MMHG | TEMPERATURE: 98 F | RESPIRATION RATE: 18 BRPM | HEIGHT: 67 IN

## 2019-10-31 VITALS — SYSTOLIC BLOOD PRESSURE: 127 MMHG | RESPIRATION RATE: 16 BRPM | DIASTOLIC BLOOD PRESSURE: 68 MMHG | HEART RATE: 84 BPM

## 2019-10-31 DIAGNOSIS — Z90.49 ACQUIRED ABSENCE OF OTHER SPECIFIED PARTS OF DIGESTIVE TRACT: Chronic | ICD-10-CM

## 2019-10-31 DIAGNOSIS — N28.1 CYST OF KIDNEY, ACQUIRED: Chronic | ICD-10-CM

## 2019-10-31 PROCEDURE — 88305 TISSUE EXAM BY PATHOLOGIST: CPT | Mod: 26

## 2019-10-31 NOTE — ASU PATIENT PROFILE, ADULT - ABILITY TO HEAR (WITH HEARING AID OR HEARING APPLIANCE IF NORMALLY USED):
Pt states that she has B/L hearing aids at home./Mildly to Moderately Impaired: difficulty hearing in some environments or speaker may need to increase volume or speak distinctly

## 2019-11-01 LAB — SURGICAL PATHOLOGY STUDY: SIGNIFICANT CHANGE UP

## 2019-11-04 PROBLEM — I10 ESSENTIAL (PRIMARY) HYPERTENSION: Chronic | Status: ACTIVE | Noted: 2019-10-31

## 2019-11-05 ENCOUNTER — OUTPATIENT (OUTPATIENT)
Dept: OUTPATIENT SERVICES | Facility: HOSPITAL | Age: 79
LOS: 1 days | Discharge: HOME | End: 2019-11-05

## 2019-11-05 DIAGNOSIS — K57.30 DIVERTICULOSIS OF LARGE INTESTINE WITHOUT PERFORATION OR ABSCESS WITHOUT BLEEDING: ICD-10-CM

## 2019-11-05 DIAGNOSIS — N28.1 CYST OF KIDNEY, ACQUIRED: Chronic | ICD-10-CM

## 2019-11-05 DIAGNOSIS — D64.9 ANEMIA, UNSPECIFIED: ICD-10-CM

## 2019-11-05 DIAGNOSIS — K52.9 NONINFECTIVE GASTROENTERITIS AND COLITIS, UNSPECIFIED: ICD-10-CM

## 2019-11-05 DIAGNOSIS — D12.3 BENIGN NEOPLASM OF TRANSVERSE COLON: ICD-10-CM

## 2019-11-05 DIAGNOSIS — I48.91 UNSPECIFIED ATRIAL FIBRILLATION: ICD-10-CM

## 2019-11-05 DIAGNOSIS — K64.8 OTHER HEMORRHOIDS: ICD-10-CM

## 2019-11-05 DIAGNOSIS — K62.1 RECTAL POLYP: ICD-10-CM

## 2019-11-05 DIAGNOSIS — Z79.01 LONG TERM (CURRENT) USE OF ANTICOAGULANTS: ICD-10-CM

## 2019-11-05 DIAGNOSIS — Z12.11 ENCOUNTER FOR SCREENING FOR MALIGNANT NEOPLASM OF COLON: ICD-10-CM

## 2019-11-05 DIAGNOSIS — D12.4 BENIGN NEOPLASM OF DESCENDING COLON: ICD-10-CM

## 2019-11-05 DIAGNOSIS — Z90.49 ACQUIRED ABSENCE OF OTHER SPECIFIED PARTS OF DIGESTIVE TRACT: Chronic | ICD-10-CM

## 2019-11-05 LAB
POCT INR: 1.8 RATIO — HIGH (ref 0.9–1.2)
POCT PT: 22.2 SEC — HIGH (ref 10–13.4)

## 2019-11-07 ENCOUNTER — OUTPATIENT (OUTPATIENT)
Dept: OUTPATIENT SERVICES | Facility: HOSPITAL | Age: 79
LOS: 1 days | Discharge: HOME | End: 2019-11-07
Payer: MEDICARE

## 2019-11-07 ENCOUNTER — RESULT REVIEW (OUTPATIENT)
Age: 79
End: 2019-11-07

## 2019-11-07 ENCOUNTER — TRANSCRIPTION ENCOUNTER (OUTPATIENT)
Age: 79
End: 2019-11-07

## 2019-11-07 VITALS
RESPIRATION RATE: 20 BRPM | TEMPERATURE: 98 F | HEART RATE: 90 BPM | SYSTOLIC BLOOD PRESSURE: 129 MMHG | HEIGHT: 67 IN | DIASTOLIC BLOOD PRESSURE: 86 MMHG | WEIGHT: 123.9 LBS

## 2019-11-07 VITALS
OXYGEN SATURATION: 99 % | DIASTOLIC BLOOD PRESSURE: 80 MMHG | RESPIRATION RATE: 18 BRPM | HEART RATE: 78 BPM | SYSTOLIC BLOOD PRESSURE: 125 MMHG

## 2019-11-07 DIAGNOSIS — Z90.49 ACQUIRED ABSENCE OF OTHER SPECIFIED PARTS OF DIGESTIVE TRACT: Chronic | ICD-10-CM

## 2019-11-07 DIAGNOSIS — N28.1 CYST OF KIDNEY, ACQUIRED: Chronic | ICD-10-CM

## 2019-11-07 PROCEDURE — 88305 TISSUE EXAM BY PATHOLOGIST: CPT | Mod: 26

## 2019-11-07 PROCEDURE — 88312 SPECIAL STAINS GROUP 1: CPT | Mod: 26

## 2019-11-07 NOTE — ASU PATIENT PROFILE, ADULT - ABILITY TO HEAR (WITH HEARING AID OR HEARING APPLIANCE IF NORMALLY USED):
Pt states that she has B/L hearing aids at home./Mildly to Moderately Impaired: difficulty hearing in some environments or speaker may need to increase volume or speak distinctly Mildly to Moderately Impaired: difficulty hearing in some environments or speaker may need to increase volume or speak distinctly

## 2019-11-07 NOTE — ASU PREOP CHECKLIST - DENTURES
Good FM. Feeling contractions, no lof/vb. gbs today. NST reactive, DVP 5.7cm. S/sx of labor and FM reviewed.    no

## 2019-11-11 ENCOUNTER — OUTPATIENT (OUTPATIENT)
Dept: OUTPATIENT SERVICES | Facility: HOSPITAL | Age: 79
LOS: 1 days | Discharge: HOME | End: 2019-11-11

## 2019-11-11 DIAGNOSIS — Z79.01 LONG TERM (CURRENT) USE OF ANTICOAGULANTS: ICD-10-CM

## 2019-11-11 DIAGNOSIS — Z90.49 ACQUIRED ABSENCE OF OTHER SPECIFIED PARTS OF DIGESTIVE TRACT: Chronic | ICD-10-CM

## 2019-11-11 DIAGNOSIS — I48.91 UNSPECIFIED ATRIAL FIBRILLATION: ICD-10-CM

## 2019-11-11 DIAGNOSIS — N28.1 CYST OF KIDNEY, ACQUIRED: Chronic | ICD-10-CM

## 2019-11-11 LAB
POCT INR: 1.8 RATIO — HIGH (ref 0.9–1.2)
POCT PT: 21.9 SEC — HIGH (ref 10–13.4)
SURGICAL PATHOLOGY STUDY: SIGNIFICANT CHANGE UP

## 2019-11-13 DIAGNOSIS — K44.9 DIAPHRAGMATIC HERNIA WITHOUT OBSTRUCTION OR GANGRENE: ICD-10-CM

## 2019-11-13 DIAGNOSIS — K31.819 ANGIODYSPLASIA OF STOMACH AND DUODENUM WITHOUT BLEEDING: ICD-10-CM

## 2019-11-13 DIAGNOSIS — D64.9 ANEMIA, UNSPECIFIED: ICD-10-CM

## 2019-11-13 DIAGNOSIS — K20.8 OTHER ESOPHAGITIS: ICD-10-CM

## 2019-11-13 DIAGNOSIS — K29.50 UNSPECIFIED CHRONIC GASTRITIS WITHOUT BLEEDING: ICD-10-CM

## 2019-11-13 DIAGNOSIS — B96.81 HELICOBACTER PYLORI [H. PYLORI] AS THE CAUSE OF DISEASES CLASSIFIED ELSEWHERE: ICD-10-CM

## 2019-11-18 ENCOUNTER — OUTPATIENT (OUTPATIENT)
Dept: OUTPATIENT SERVICES | Facility: HOSPITAL | Age: 79
LOS: 1 days | Discharge: HOME | End: 2019-11-18

## 2019-11-18 DIAGNOSIS — I48.91 UNSPECIFIED ATRIAL FIBRILLATION: ICD-10-CM

## 2019-11-18 DIAGNOSIS — Z90.49 ACQUIRED ABSENCE OF OTHER SPECIFIED PARTS OF DIGESTIVE TRACT: Chronic | ICD-10-CM

## 2019-11-18 DIAGNOSIS — N28.1 CYST OF KIDNEY, ACQUIRED: Chronic | ICD-10-CM

## 2019-11-18 DIAGNOSIS — Z79.01 LONG TERM (CURRENT) USE OF ANTICOAGULANTS: ICD-10-CM

## 2019-11-18 LAB
POCT INR: 2.9 RATIO — HIGH (ref 0.9–1.2)
POCT PT: 34.8 SEC — HIGH (ref 10–13.4)

## 2019-11-22 ENCOUNTER — OUTPATIENT (OUTPATIENT)
Dept: OUTPATIENT SERVICES | Facility: HOSPITAL | Age: 79
LOS: 1 days | Discharge: HOME | End: 2019-11-22

## 2019-11-22 DIAGNOSIS — Z90.49 ACQUIRED ABSENCE OF OTHER SPECIFIED PARTS OF DIGESTIVE TRACT: Chronic | ICD-10-CM

## 2019-11-22 DIAGNOSIS — N28.1 CYST OF KIDNEY, ACQUIRED: Chronic | ICD-10-CM

## 2019-11-22 DIAGNOSIS — Z79.01 LONG TERM (CURRENT) USE OF ANTICOAGULANTS: ICD-10-CM

## 2019-11-22 DIAGNOSIS — I48.91 UNSPECIFIED ATRIAL FIBRILLATION: ICD-10-CM

## 2019-11-22 LAB
POCT INR: 1.4 RATIO — HIGH (ref 0.9–1.2)
POCT PT: 17.4 SEC — HIGH (ref 10–13.4)

## 2019-11-25 ENCOUNTER — OUTPATIENT (OUTPATIENT)
Dept: OUTPATIENT SERVICES | Facility: HOSPITAL | Age: 79
LOS: 1 days | Discharge: HOME | End: 2019-11-25

## 2019-11-25 DIAGNOSIS — I48.91 UNSPECIFIED ATRIAL FIBRILLATION: ICD-10-CM

## 2019-11-25 DIAGNOSIS — Z90.49 ACQUIRED ABSENCE OF OTHER SPECIFIED PARTS OF DIGESTIVE TRACT: Chronic | ICD-10-CM

## 2019-11-25 DIAGNOSIS — N28.1 CYST OF KIDNEY, ACQUIRED: Chronic | ICD-10-CM

## 2019-11-25 DIAGNOSIS — Z79.01 LONG TERM (CURRENT) USE OF ANTICOAGULANTS: ICD-10-CM

## 2019-11-25 LAB
POCT INR: 1.6 RATIO — HIGH (ref 0.9–1.2)
POCT PT: 18.6 SEC — HIGH (ref 10–13.4)

## 2020-01-01 ENCOUNTER — APPOINTMENT (OUTPATIENT)
Dept: MEDICATION MANAGEMENT | Facility: CLINIC | Age: 80
End: 2020-01-01

## 2020-01-01 ENCOUNTER — OUTPATIENT (OUTPATIENT)
Dept: OUTPATIENT SERVICES | Facility: HOSPITAL | Age: 80
LOS: 1 days | Discharge: HOME | End: 2020-01-01

## 2020-01-01 ENCOUNTER — LABORATORY RESULT (OUTPATIENT)
Age: 80
End: 2020-01-01

## 2020-01-01 ENCOUNTER — APPOINTMENT (OUTPATIENT)
Dept: CARE COORDINATION | Facility: HOME HEALTH | Age: 80
End: 2020-01-01

## 2020-01-01 ENCOUNTER — APPOINTMENT (OUTPATIENT)
Dept: CARDIOLOGY | Facility: CLINIC | Age: 80
End: 2020-01-01

## 2020-01-01 ENCOUNTER — INPATIENT (INPATIENT)
Facility: HOSPITAL | Age: 80
LOS: 8 days | Discharge: HOME | End: 2020-08-02
Attending: INTERNAL MEDICINE | Admitting: INTERNAL MEDICINE
Payer: MEDICARE

## 2020-01-01 ENCOUNTER — TRANSCRIPTION ENCOUNTER (OUTPATIENT)
Age: 80
End: 2020-01-01

## 2020-01-01 ENCOUNTER — RECORD ABSTRACTING (OUTPATIENT)
Age: 80
End: 2020-01-01

## 2020-01-01 ENCOUNTER — APPOINTMENT (OUTPATIENT)
Dept: CARDIOLOGY | Facility: CLINIC | Age: 80
End: 2020-01-01
Payer: MEDICARE

## 2020-01-01 ENCOUNTER — APPOINTMENT (OUTPATIENT)
Dept: CARE COORDINATION | Facility: HOME HEALTH | Age: 80
End: 2020-01-01
Payer: MEDICARE

## 2020-01-01 VITALS — DIASTOLIC BLOOD PRESSURE: 71 MMHG | HEART RATE: 70 BPM | SYSTOLIC BLOOD PRESSURE: 160 MMHG

## 2020-01-01 VITALS
HEART RATE: 54 BPM | DIASTOLIC BLOOD PRESSURE: 70 MMHG | OXYGEN SATURATION: 97 % | TEMPERATURE: 98 F | SYSTOLIC BLOOD PRESSURE: 146 MMHG | RESPIRATION RATE: 22 BRPM

## 2020-01-01 VITALS
BODY MASS INDEX: 21.97 KG/M2 | HEIGHT: 67 IN | WEIGHT: 140 LBS | HEART RATE: 101 BPM | DIASTOLIC BLOOD PRESSURE: 64 MMHG | SYSTOLIC BLOOD PRESSURE: 113 MMHG | TEMPERATURE: 98.7 F

## 2020-01-01 VITALS
SYSTOLIC BLOOD PRESSURE: 147 MMHG | RESPIRATION RATE: 16 BRPM | TEMPERATURE: 98 F | DIASTOLIC BLOOD PRESSURE: 70 MMHG | OXYGEN SATURATION: 95 %

## 2020-01-01 DIAGNOSIS — Z79.01 LONG TERM (CURRENT) USE OF ANTICOAGULANTS: ICD-10-CM

## 2020-01-01 DIAGNOSIS — N28.1 CYST OF KIDNEY, ACQUIRED: Chronic | ICD-10-CM

## 2020-01-01 DIAGNOSIS — J96.21 ACUTE AND CHRONIC RESPIRATORY FAILURE WITH HYPOXIA: ICD-10-CM

## 2020-01-01 DIAGNOSIS — E87.3 ALKALOSIS: ICD-10-CM

## 2020-01-01 DIAGNOSIS — I48.91 UNSPECIFIED ATRIAL FIBRILLATION: ICD-10-CM

## 2020-01-01 DIAGNOSIS — I49.5 SICK SINUS SYNDROME: ICD-10-CM

## 2020-01-01 DIAGNOSIS — J44.9 CHRONIC OBSTRUCTIVE PULMONARY DISEASE, UNSPECIFIED: ICD-10-CM

## 2020-01-01 DIAGNOSIS — J44.1 CHRONIC OBSTRUCTIVE PULMONARY DISEASE WITH (ACUTE) EXACERBATION: ICD-10-CM

## 2020-01-01 DIAGNOSIS — Z90.49 ACQUIRED ABSENCE OF OTHER SPECIFIED PARTS OF DIGESTIVE TRACT: Chronic | ICD-10-CM

## 2020-01-01 DIAGNOSIS — Z51.81 ENCOUNTER FOR THERAPEUTIC DRUG LVL MONITORING: ICD-10-CM

## 2020-01-01 DIAGNOSIS — I27.20 PULMONARY HYPERTENSION, UNSPECIFIED: ICD-10-CM

## 2020-01-01 DIAGNOSIS — Z79.01 ENCOUNTER FOR THERAPEUTIC DRUG LVL MONITORING: ICD-10-CM

## 2020-01-01 DIAGNOSIS — Z02.9 ENCOUNTER FOR ADMINISTRATIVE EXAMINATIONS, UNSPECIFIED: ICD-10-CM

## 2020-01-01 DIAGNOSIS — I25.10 ATHEROSCLEROTIC HEART DISEASE OF NATIVE CORONARY ARTERY WITHOUT ANGINA PECTORIS: ICD-10-CM

## 2020-01-01 DIAGNOSIS — Z86.79 PERSONAL HISTORY OF OTHER DISEASES OF THE CIRCULATORY SYSTEM: ICD-10-CM

## 2020-01-01 DIAGNOSIS — E87.5 HYPERKALEMIA: ICD-10-CM

## 2020-01-01 DIAGNOSIS — Z66 DO NOT RESUSCITATE: ICD-10-CM

## 2020-01-01 DIAGNOSIS — N28.1 CYST OF KIDNEY, ACQUIRED: ICD-10-CM

## 2020-01-01 DIAGNOSIS — Z87.891 PERSONAL HISTORY OF NICOTINE DEPENDENCE: ICD-10-CM

## 2020-01-01 DIAGNOSIS — I48.20 CHRONIC ATRIAL FIBRILLATION, UNSPECIFIED: ICD-10-CM

## 2020-01-01 DIAGNOSIS — I10 ESSENTIAL (PRIMARY) HYPERTENSION: ICD-10-CM

## 2020-01-01 DIAGNOSIS — I48.19 OTHER PERSISTENT ATRIAL FIBRILLATION: ICD-10-CM

## 2020-01-01 DIAGNOSIS — Z45.018 ENCOUNTER FOR ADJUSTMENT AND MANAGEMENT OF OTHER PART OF CARDIAC PACEMAKER: ICD-10-CM

## 2020-01-01 DIAGNOSIS — E78.5 HYPERLIPIDEMIA, UNSPECIFIED: ICD-10-CM

## 2020-01-01 DIAGNOSIS — I34.0 NONRHEUMATIC MITRAL (VALVE) INSUFFICIENCY: ICD-10-CM

## 2020-01-01 DIAGNOSIS — Z79.82 LONG TERM (CURRENT) USE OF ASPIRIN: ICD-10-CM

## 2020-01-01 DIAGNOSIS — K64.9 UNSPECIFIED HEMORRHOIDS: ICD-10-CM

## 2020-01-01 LAB
ALBUMIN SERPL ELPH-MCNC: 3.5 G/DL — SIGNIFICANT CHANGE UP (ref 3.5–5.2)
ALBUMIN SERPL ELPH-MCNC: 3.9 G/DL — SIGNIFICANT CHANGE UP (ref 3.5–5.2)
ALBUMIN SERPL ELPH-MCNC: 3.9 G/DL — SIGNIFICANT CHANGE UP (ref 3.5–5.2)
ALBUMIN SERPL ELPH-MCNC: 4 G/DL — SIGNIFICANT CHANGE UP (ref 3.5–5.2)
ALP SERPL-CCNC: 72 U/L — SIGNIFICANT CHANGE UP (ref 30–115)
ALP SERPL-CCNC: 82 U/L — SIGNIFICANT CHANGE UP (ref 30–115)
ALP SERPL-CCNC: 89 U/L — SIGNIFICANT CHANGE UP (ref 30–115)
ALP SERPL-CCNC: 91 U/L — SIGNIFICANT CHANGE UP (ref 30–115)
ALT FLD-CCNC: 19 U/L — SIGNIFICANT CHANGE UP (ref 0–41)
ALT FLD-CCNC: 21 U/L — SIGNIFICANT CHANGE UP (ref 0–41)
ALT FLD-CCNC: 21 U/L — SIGNIFICANT CHANGE UP (ref 0–41)
ALT FLD-CCNC: 25 U/L — SIGNIFICANT CHANGE UP (ref 0–41)
ANION GAP SERPL CALC-SCNC: 12 MMOL/L — SIGNIFICANT CHANGE UP (ref 7–14)
ANION GAP SERPL CALC-SCNC: 13 MMOL/L — SIGNIFICANT CHANGE UP (ref 7–14)
ANION GAP SERPL CALC-SCNC: 13 MMOL/L — SIGNIFICANT CHANGE UP (ref 7–14)
ANION GAP SERPL CALC-SCNC: 14 MMOL/L — SIGNIFICANT CHANGE UP (ref 7–14)
ANION GAP SERPL CALC-SCNC: 14 MMOL/L — SIGNIFICANT CHANGE UP (ref 7–14)
ANION GAP SERPL CALC-SCNC: 15 MMOL/L — HIGH (ref 7–14)
ANION GAP SERPL CALC-SCNC: 18 MMOL/L — HIGH (ref 7–14)
ANION GAP SERPL CALC-SCNC: 8 MMOL/L — SIGNIFICANT CHANGE UP (ref 7–14)
ANION GAP SERPL CALC-SCNC: 9 MMOL/L — SIGNIFICANT CHANGE UP (ref 7–14)
ANION GAP SERPL CALC-SCNC: 9 MMOL/L — SIGNIFICANT CHANGE UP (ref 7–14)
APPEARANCE UR: CLEAR — SIGNIFICANT CHANGE UP
APTT BLD: 32.8 SEC — SIGNIFICANT CHANGE UP (ref 27–39.2)
APTT BLD: 33.9 SEC — SIGNIFICANT CHANGE UP (ref 27–39.2)
APTT BLD: 34.8 SEC — SIGNIFICANT CHANGE UP (ref 27–39.2)
AST SERPL-CCNC: 17 U/L — SIGNIFICANT CHANGE UP (ref 0–41)
AST SERPL-CCNC: 19 U/L — SIGNIFICANT CHANGE UP (ref 0–41)
AST SERPL-CCNC: 19 U/L — SIGNIFICANT CHANGE UP (ref 0–41)
AST SERPL-CCNC: 20 U/L — SIGNIFICANT CHANGE UP (ref 0–41)
BASE EXCESS BLDV CALC-SCNC: 4.2 MMOL/L — HIGH (ref -2–2)
BASOPHILS # BLD AUTO: 0.01 K/UL — SIGNIFICANT CHANGE UP (ref 0–0.2)
BASOPHILS # BLD AUTO: 0.02 K/UL — SIGNIFICANT CHANGE UP (ref 0–0.2)
BASOPHILS # BLD AUTO: 0.05 K/UL — SIGNIFICANT CHANGE UP (ref 0–0.2)
BASOPHILS NFR BLD AUTO: 0.1 % — SIGNIFICANT CHANGE UP (ref 0–1)
BASOPHILS NFR BLD AUTO: 0.2 % — SIGNIFICANT CHANGE UP (ref 0–1)
BASOPHILS NFR BLD AUTO: 0.5 % — SIGNIFICANT CHANGE UP (ref 0–1)
BILIRUB SERPL-MCNC: 0.3 MG/DL — SIGNIFICANT CHANGE UP (ref 0.2–1.2)
BILIRUB SERPL-MCNC: 0.4 MG/DL — SIGNIFICANT CHANGE UP (ref 0.2–1.2)
BILIRUB SERPL-MCNC: 0.4 MG/DL — SIGNIFICANT CHANGE UP (ref 0.2–1.2)
BILIRUB SERPL-MCNC: 0.8 MG/DL — SIGNIFICANT CHANGE UP (ref 0.2–1.2)
BILIRUB UR-MCNC: NEGATIVE — SIGNIFICANT CHANGE UP
BLD GP AB SCN SERPL QL: SIGNIFICANT CHANGE UP
BUN SERPL-MCNC: 15 MG/DL — SIGNIFICANT CHANGE UP (ref 10–20)
BUN SERPL-MCNC: 16 MG/DL — SIGNIFICANT CHANGE UP (ref 10–20)
BUN SERPL-MCNC: 17 MG/DL — SIGNIFICANT CHANGE UP (ref 10–20)
BUN SERPL-MCNC: 22 MG/DL — HIGH (ref 10–20)
BUN SERPL-MCNC: 22 MG/DL — HIGH (ref 10–20)
BUN SERPL-MCNC: 24 MG/DL — HIGH (ref 10–20)
BUN SERPL-MCNC: 26 MG/DL — HIGH (ref 10–20)
BUN SERPL-MCNC: 28 MG/DL — HIGH (ref 10–20)
BUN SERPL-MCNC: 28 MG/DL — HIGH (ref 10–20)
BUN SERPL-MCNC: 30 MG/DL — HIGH (ref 10–20)
CA-I SERPL-SCNC: 1.14 MMOL/L — SIGNIFICANT CHANGE UP (ref 1.12–1.3)
CALCIUM SERPL-MCNC: 10 MG/DL — SIGNIFICANT CHANGE UP (ref 8.5–10.1)
CALCIUM SERPL-MCNC: 8.4 MG/DL — LOW (ref 8.5–10.1)
CALCIUM SERPL-MCNC: 8.7 MG/DL — SIGNIFICANT CHANGE UP (ref 8.5–10.1)
CALCIUM SERPL-MCNC: 8.9 MG/DL — SIGNIFICANT CHANGE UP (ref 8.5–10.1)
CALCIUM SERPL-MCNC: 9 MG/DL — SIGNIFICANT CHANGE UP (ref 8.5–10.1)
CALCIUM SERPL-MCNC: 9 MG/DL — SIGNIFICANT CHANGE UP (ref 8.5–10.1)
CALCIUM SERPL-MCNC: 9.2 MG/DL — SIGNIFICANT CHANGE UP (ref 8.5–10.1)
CALCIUM SERPL-MCNC: 9.3 MG/DL — SIGNIFICANT CHANGE UP (ref 8.5–10.1)
CALCIUM SERPL-MCNC: 9.3 MG/DL — SIGNIFICANT CHANGE UP (ref 8.5–10.1)
CALCIUM SERPL-MCNC: 9.7 MG/DL — SIGNIFICANT CHANGE UP (ref 8.5–10.1)
CHLORIDE SERPL-SCNC: 100 MMOL/L — SIGNIFICANT CHANGE UP (ref 98–110)
CHLORIDE SERPL-SCNC: 101 MMOL/L — SIGNIFICANT CHANGE UP (ref 98–110)
CHLORIDE SERPL-SCNC: 101 MMOL/L — SIGNIFICANT CHANGE UP (ref 98–110)
CHLORIDE SERPL-SCNC: 95 MMOL/L — LOW (ref 98–110)
CHLORIDE SERPL-SCNC: 96 MMOL/L — LOW (ref 98–110)
CHLORIDE SERPL-SCNC: 96 MMOL/L — LOW (ref 98–110)
CHLORIDE SERPL-SCNC: 98 MMOL/L — SIGNIFICANT CHANGE UP (ref 98–110)
CHLORIDE SERPL-SCNC: 99 MMOL/L — SIGNIFICANT CHANGE UP (ref 98–110)
CO2 SERPL-SCNC: 22 MMOL/L — SIGNIFICANT CHANGE UP (ref 17–32)
CO2 SERPL-SCNC: 23 MMOL/L — SIGNIFICANT CHANGE UP (ref 17–32)
CO2 SERPL-SCNC: 24 MMOL/L — SIGNIFICANT CHANGE UP (ref 17–32)
CO2 SERPL-SCNC: 24 MMOL/L — SIGNIFICANT CHANGE UP (ref 17–32)
CO2 SERPL-SCNC: 25 MMOL/L — SIGNIFICANT CHANGE UP (ref 17–32)
CO2 SERPL-SCNC: 25 MMOL/L — SIGNIFICANT CHANGE UP (ref 17–32)
CO2 SERPL-SCNC: 26 MMOL/L — SIGNIFICANT CHANGE UP (ref 17–32)
CO2 SERPL-SCNC: 28 MMOL/L — SIGNIFICANT CHANGE UP (ref 17–32)
CO2 SERPL-SCNC: 30 MMOL/L — SIGNIFICANT CHANGE UP (ref 17–32)
CO2 SERPL-SCNC: 31 MMOL/L — SIGNIFICANT CHANGE UP (ref 17–32)
COLOR SPEC: SIGNIFICANT CHANGE UP
CREAT SERPL-MCNC: 0.7 MG/DL — SIGNIFICANT CHANGE UP (ref 0.7–1.5)
CREAT SERPL-MCNC: 0.8 MG/DL — SIGNIFICANT CHANGE UP (ref 0.7–1.5)
CREAT SERPL-MCNC: 0.9 MG/DL — SIGNIFICANT CHANGE UP (ref 0.7–1.5)
CREAT SERPL-MCNC: 1 MG/DL — SIGNIFICANT CHANGE UP (ref 0.7–1.5)
CREAT SERPL-MCNC: 1.1 MG/DL — SIGNIFICANT CHANGE UP (ref 0.7–1.5)
CREAT SERPL-MCNC: 1.2 MG/DL — SIGNIFICANT CHANGE UP (ref 0.7–1.5)
DIFF PNL FLD: NEGATIVE — SIGNIFICANT CHANGE UP
DIGOXIN SERPL-MCNC: 1.5 NG/ML — SIGNIFICANT CHANGE UP (ref 0.8–2)
DIGOXIN SERPL-MCNC: 1.5 NG/ML — SIGNIFICANT CHANGE UP (ref 0.8–2)
DIGOXIN SERPL-MCNC: 1.9 NG/ML — SIGNIFICANT CHANGE UP (ref 0.8–2)
EOSINOPHIL # BLD AUTO: 0 K/UL — SIGNIFICANT CHANGE UP (ref 0–0.7)
EOSINOPHIL # BLD AUTO: 0.09 K/UL — SIGNIFICANT CHANGE UP (ref 0–0.7)
EOSINOPHIL # BLD AUTO: 0.15 K/UL — SIGNIFICANT CHANGE UP (ref 0–0.7)
EOSINOPHIL # BLD AUTO: 0.23 K/UL — SIGNIFICANT CHANGE UP (ref 0–0.7)
EOSINOPHIL # BLD AUTO: 0.23 K/UL — SIGNIFICANT CHANGE UP (ref 0–0.7)
EOSINOPHIL NFR BLD AUTO: 0 % — SIGNIFICANT CHANGE UP (ref 0–8)
EOSINOPHIL NFR BLD AUTO: 0.8 % — SIGNIFICANT CHANGE UP (ref 0–8)
EOSINOPHIL NFR BLD AUTO: 1.5 % — SIGNIFICANT CHANGE UP (ref 0–8)
EOSINOPHIL NFR BLD AUTO: 2.3 % — SIGNIFICANT CHANGE UP (ref 0–8)
EOSINOPHIL NFR BLD AUTO: 2.4 % — SIGNIFICANT CHANGE UP (ref 0–8)
GAS PNL BLDV: 136 MMOL/L — SIGNIFICANT CHANGE UP (ref 136–145)
GAS PNL BLDV: SIGNIFICANT CHANGE UP
GLUCOSE BLDC GLUCOMTR-MCNC: 139 MG/DL — HIGH (ref 70–99)
GLUCOSE BLDC GLUCOMTR-MCNC: 213 MG/DL — HIGH (ref 70–99)
GLUCOSE SERPL-MCNC: 117 MG/DL — HIGH (ref 70–99)
GLUCOSE SERPL-MCNC: 123 MG/DL — HIGH (ref 70–99)
GLUCOSE SERPL-MCNC: 146 MG/DL — HIGH (ref 70–99)
GLUCOSE SERPL-MCNC: 172 MG/DL — HIGH (ref 70–99)
GLUCOSE SERPL-MCNC: 183 MG/DL — HIGH (ref 70–99)
GLUCOSE SERPL-MCNC: 206 MG/DL — HIGH (ref 70–99)
GLUCOSE SERPL-MCNC: 233 MG/DL — HIGH (ref 70–99)
GLUCOSE SERPL-MCNC: 252 MG/DL — HIGH (ref 70–99)
GLUCOSE SERPL-MCNC: 85 MG/DL — SIGNIFICANT CHANGE UP (ref 70–99)
GLUCOSE SERPL-MCNC: 87 MG/DL — SIGNIFICANT CHANGE UP (ref 70–99)
GLUCOSE UR QL: NEGATIVE — SIGNIFICANT CHANGE UP
HCO3 BLDV-SCNC: 29 MMOL/L — SIGNIFICANT CHANGE UP (ref 22–29)
HCT VFR BLD CALC: 36.4 % — LOW (ref 37–47)
HCT VFR BLD CALC: 36.9 % — LOW (ref 37–47)
HCT VFR BLD CALC: 37.1 % — SIGNIFICANT CHANGE UP (ref 37–47)
HCT VFR BLD CALC: 37.3 % — SIGNIFICANT CHANGE UP (ref 37–47)
HCT VFR BLD CALC: 37.8 % — SIGNIFICANT CHANGE UP (ref 37–47)
HCT VFR BLD CALC: 38.1 % — SIGNIFICANT CHANGE UP (ref 37–47)
HCT VFR BLD CALC: 38.6 % — SIGNIFICANT CHANGE UP (ref 37–47)
HCT VFR BLD CALC: 39.2 % — SIGNIFICANT CHANGE UP (ref 37–47)
HCT VFR BLDA CALC: 39 % — SIGNIFICANT CHANGE UP (ref 34–44)
HGB BLD CALC-MCNC: 12.7 G/DL — LOW (ref 14–18)
HGB BLD-MCNC: 11.7 G/DL — LOW (ref 12–16)
HGB BLD-MCNC: 11.8 G/DL — LOW (ref 12–16)
HGB BLD-MCNC: 11.9 G/DL — LOW (ref 12–16)
HGB BLD-MCNC: 11.9 G/DL — LOW (ref 12–16)
HGB BLD-MCNC: 12 G/DL — SIGNIFICANT CHANGE UP (ref 12–16)
HGB BLD-MCNC: 12.1 G/DL — SIGNIFICANT CHANGE UP (ref 12–16)
HGB BLD-MCNC: 12.2 G/DL — SIGNIFICANT CHANGE UP (ref 12–16)
HGB BLD-MCNC: 12.6 G/DL — SIGNIFICANT CHANGE UP (ref 12–16)
IMM GRANULOCYTES NFR BLD AUTO: 0.3 % — SIGNIFICANT CHANGE UP (ref 0.1–0.3)
IMM GRANULOCYTES NFR BLD AUTO: 0.6 % — HIGH (ref 0.1–0.3)
IMM GRANULOCYTES NFR BLD AUTO: 0.8 % — HIGH (ref 0.1–0.3)
IMM GRANULOCYTES NFR BLD AUTO: 1 % — HIGH (ref 0.1–0.3)
IMM GRANULOCYTES NFR BLD AUTO: 1.5 % — HIGH (ref 0.1–0.3)
INR BLD: 1.36 RATIO — HIGH (ref 0.65–1.3)
INR BLD: 1.46 RATIO — HIGH (ref 0.65–1.3)
INR BLD: 1.57 RATIO — HIGH (ref 0.65–1.3)
INR BLD: 1.77 RATIO — HIGH (ref 0.65–1.3)
INR BLD: 1.79 RATIO — HIGH (ref 0.65–1.3)
INR BLD: 1.92 RATIO — HIGH (ref 0.65–1.3)
INR BLD: 1.97 RATIO — HIGH (ref 0.65–1.3)
INR BLD: 2.57 RATIO — HIGH (ref 0.65–1.3)
INR BLD: 3.36 RATIO — HIGH (ref 0.65–1.3)
INR BLD: 3.83 RATIO — HIGH (ref 0.65–1.3)
INR PPP: 1.3 RATIO
INR PPP: 1.7 RATIO
INR PPP: 1.79
INR PPP: 1.8 RATIO
INR PPP: 1.9 RATIO
INR PPP: 2 RATIO
INR PPP: 2 RATIO
INR PPP: 2.1 RATIO
INR PPP: 2.1 RATIO
INR PPP: 2.4 RATIO
INR PPP: 2.5 RATIO
INR PPP: 2.6 RATIO
INR PPP: 2.9 RATIO
INR PPP: 2.9 RATIO
INR PPP: 3.2 RATIO
INR PPP: 3.3 RATIO
INR PPP: 3.4 RATIO
INR PPP: 3.8 RATIO
KETONES UR-MCNC: NEGATIVE — SIGNIFICANT CHANGE UP
LACTATE BLDV-MCNC: 1.5 MMOL/L — SIGNIFICANT CHANGE UP (ref 0.5–1.6)
LEUKOCYTE ESTERASE UR-ACNC: NEGATIVE — SIGNIFICANT CHANGE UP
LYMPHOCYTES # BLD AUTO: 0.45 K/UL — LOW (ref 1.2–3.4)
LYMPHOCYTES # BLD AUTO: 0.52 K/UL — LOW (ref 1.2–3.4)
LYMPHOCYTES # BLD AUTO: 0.53 K/UL — LOW (ref 1.2–3.4)
LYMPHOCYTES # BLD AUTO: 0.89 K/UL — LOW (ref 1.2–3.4)
LYMPHOCYTES # BLD AUTO: 1.24 K/UL — SIGNIFICANT CHANGE UP (ref 1.2–3.4)
LYMPHOCYTES # BLD AUTO: 1.47 K/UL — SIGNIFICANT CHANGE UP (ref 1.2–3.4)
LYMPHOCYTES # BLD AUTO: 1.78 K/UL — SIGNIFICANT CHANGE UP (ref 1.2–3.4)
LYMPHOCYTES # BLD AUTO: 10.5 % — LOW (ref 20.5–51.1)
LYMPHOCYTES # BLD AUTO: 15.6 % — LOW (ref 20.5–51.1)
LYMPHOCYTES # BLD AUTO: 17.5 % — LOW (ref 20.5–51.1)
LYMPHOCYTES # BLD AUTO: 3.7 % — LOW (ref 20.5–51.1)
LYMPHOCYTES # BLD AUTO: 4.8 % — LOW (ref 20.5–51.1)
LYMPHOCYTES # BLD AUTO: 6 % — LOW (ref 20.5–51.1)
LYMPHOCYTES # BLD AUTO: 8.7 % — LOW (ref 20.5–51.1)
MAGNESIUM SERPL-MCNC: 2 MG/DL — SIGNIFICANT CHANGE UP (ref 1.8–2.4)
MAGNESIUM SERPL-MCNC: 2 MG/DL — SIGNIFICANT CHANGE UP (ref 1.8–2.4)
MAGNESIUM SERPL-MCNC: 2.2 MG/DL — SIGNIFICANT CHANGE UP (ref 1.8–2.4)
MCHC RBC-ENTMCNC: 27.9 PG — SIGNIFICANT CHANGE UP (ref 27–31)
MCHC RBC-ENTMCNC: 28 PG — SIGNIFICANT CHANGE UP (ref 27–31)
MCHC RBC-ENTMCNC: 28.1 PG — SIGNIFICANT CHANGE UP (ref 27–31)
MCHC RBC-ENTMCNC: 28.4 PG — SIGNIFICANT CHANGE UP (ref 27–31)
MCHC RBC-ENTMCNC: 28.5 PG — SIGNIFICANT CHANGE UP (ref 27–31)
MCHC RBC-ENTMCNC: 28.6 PG — SIGNIFICANT CHANGE UP (ref 27–31)
MCHC RBC-ENTMCNC: 28.8 PG — SIGNIFICANT CHANGE UP (ref 27–31)
MCHC RBC-ENTMCNC: 28.9 PG — SIGNIFICANT CHANGE UP (ref 27–31)
MCHC RBC-ENTMCNC: 28.9 PG — SIGNIFICANT CHANGE UP (ref 27–31)
MCHC RBC-ENTMCNC: 31 G/DL — LOW (ref 32–37)
MCHC RBC-ENTMCNC: 31.3 G/DL — LOW (ref 32–37)
MCHC RBC-ENTMCNC: 31.5 G/DL — LOW (ref 32–37)
MCHC RBC-ENTMCNC: 31.9 G/DL — LOW (ref 32–37)
MCHC RBC-ENTMCNC: 32.1 G/DL — SIGNIFICANT CHANGE UP (ref 32–37)
MCHC RBC-ENTMCNC: 32.1 G/DL — SIGNIFICANT CHANGE UP (ref 32–37)
MCHC RBC-ENTMCNC: 32.5 G/DL — SIGNIFICANT CHANGE UP (ref 32–37)
MCHC RBC-ENTMCNC: 32.9 G/DL — SIGNIFICANT CHANGE UP (ref 32–37)
MCV RBC AUTO: 86.9 FL — SIGNIFICANT CHANGE UP (ref 81–99)
MCV RBC AUTO: 87.3 FL — SIGNIFICANT CHANGE UP (ref 81–99)
MCV RBC AUTO: 88.3 FL — SIGNIFICANT CHANGE UP (ref 81–99)
MCV RBC AUTO: 88.9 FL — SIGNIFICANT CHANGE UP (ref 81–99)
MCV RBC AUTO: 89.1 FL — SIGNIFICANT CHANGE UP (ref 81–99)
MCV RBC AUTO: 89.2 FL — SIGNIFICANT CHANGE UP (ref 81–99)
MCV RBC AUTO: 90 FL — SIGNIFICANT CHANGE UP (ref 81–99)
MCV RBC AUTO: 90.2 FL — SIGNIFICANT CHANGE UP (ref 81–99)
MCV RBC AUTO: 90.5 FL — SIGNIFICANT CHANGE UP (ref 81–99)
MCV RBC AUTO: 91.4 FL — SIGNIFICANT CHANGE UP (ref 81–99)
MCV RBC AUTO: 91.6 FL — SIGNIFICANT CHANGE UP (ref 81–99)
MONOCYTES # BLD AUTO: 0.2 K/UL — SIGNIFICANT CHANGE UP (ref 0.1–0.6)
MONOCYTES # BLD AUTO: 0.42 K/UL — SIGNIFICANT CHANGE UP (ref 0.1–0.6)
MONOCYTES # BLD AUTO: 0.59 K/UL — SIGNIFICANT CHANGE UP (ref 0.1–0.6)
MONOCYTES # BLD AUTO: 0.85 K/UL — HIGH (ref 0.1–0.6)
MONOCYTES # BLD AUTO: 1.04 K/UL — HIGH (ref 0.1–0.6)
MONOCYTES # BLD AUTO: 1.1 K/UL — HIGH (ref 0.1–0.6)
MONOCYTES # BLD AUTO: 1.25 K/UL — HIGH (ref 0.1–0.6)
MONOCYTES NFR BLD AUTO: 10.2 % — HIGH (ref 1.7–9.3)
MONOCYTES NFR BLD AUTO: 10.6 % — HIGH (ref 1.7–9.3)
MONOCYTES NFR BLD AUTO: 11.7 % — HIGH (ref 1.7–9.3)
MONOCYTES NFR BLD AUTO: 2.3 % — SIGNIFICANT CHANGE UP (ref 1.7–9.3)
MONOCYTES NFR BLD AUTO: 3.4 % — SIGNIFICANT CHANGE UP (ref 1.7–9.3)
MONOCYTES NFR BLD AUTO: 5.3 % — SIGNIFICANT CHANGE UP (ref 1.7–9.3)
MONOCYTES NFR BLD AUTO: 8.3 % — SIGNIFICANT CHANGE UP (ref 1.7–9.3)
NEUTROPHILS # BLD AUTO: 11.26 K/UL — HIGH (ref 1.4–6.5)
NEUTROPHILS # BLD AUTO: 6.55 K/UL — HIGH (ref 1.4–6.5)
NEUTROPHILS # BLD AUTO: 6.96 K/UL — HIGH (ref 1.4–6.5)
NEUTROPHILS # BLD AUTO: 7.95 K/UL — HIGH (ref 1.4–6.5)
NEUTROPHILS # BLD AUTO: 8.28 K/UL — HIGH (ref 1.4–6.5)
NEUTROPHILS # BLD AUTO: 9.13 K/UL — HIGH (ref 1.4–6.5)
NEUTROPHILS # BLD AUTO: 9.9 K/UL — HIGH (ref 1.4–6.5)
NEUTROPHILS NFR BLD AUTO: 68.3 % — SIGNIFICANT CHANGE UP (ref 42.2–75.2)
NEUTROPHILS NFR BLD AUTO: 69.4 % — SIGNIFICANT CHANGE UP (ref 42.2–75.2)
NEUTROPHILS NFR BLD AUTO: 77.2 % — HIGH (ref 42.2–75.2)
NEUTROPHILS NFR BLD AUTO: 80.7 % — HIGH (ref 42.2–75.2)
NEUTROPHILS NFR BLD AUTO: 89 % — HIGH (ref 42.2–75.2)
NEUTROPHILS NFR BLD AUTO: 91 % — HIGH (ref 42.2–75.2)
NEUTROPHILS NFR BLD AUTO: 91.8 % — HIGH (ref 42.2–75.2)
NITRITE UR-MCNC: NEGATIVE — SIGNIFICANT CHANGE UP
NRBC # BLD: 0 /100 WBCS — SIGNIFICANT CHANGE UP (ref 0–0)
NT-PROBNP SERPL-SCNC: 1125 PG/ML — HIGH (ref 0–300)
NT-PROBNP SERPL-SCNC: 1462 PG/ML — HIGH (ref 0–300)
PCO2 BLDV: 42 MMHG — SIGNIFICANT CHANGE UP (ref 41–51)
PH BLDV: 7.45 — HIGH (ref 7.26–7.43)
PH UR: 6 — SIGNIFICANT CHANGE UP (ref 5–8)
PHOSPHATE SERPL-MCNC: 3.9 MG/DL — SIGNIFICANT CHANGE UP (ref 2.1–4.9)
PHOSPHATE SERPL-MCNC: 4.4 MG/DL — SIGNIFICANT CHANGE UP (ref 2.1–4.9)
PLATELET # BLD AUTO: 297 K/UL — SIGNIFICANT CHANGE UP (ref 130–400)
PLATELET # BLD AUTO: 305 K/UL — SIGNIFICANT CHANGE UP (ref 130–400)
PLATELET # BLD AUTO: 305 K/UL — SIGNIFICANT CHANGE UP (ref 130–400)
PLATELET # BLD AUTO: 308 K/UL — SIGNIFICANT CHANGE UP (ref 130–400)
PLATELET # BLD AUTO: 309 K/UL — SIGNIFICANT CHANGE UP (ref 130–400)
PLATELET # BLD AUTO: 319 K/UL — SIGNIFICANT CHANGE UP (ref 130–400)
PLATELET # BLD AUTO: 321 K/UL — SIGNIFICANT CHANGE UP (ref 130–400)
PLATELET # BLD AUTO: 322 K/UL — SIGNIFICANT CHANGE UP (ref 130–400)
PLATELET # BLD AUTO: 325 K/UL — SIGNIFICANT CHANGE UP (ref 130–400)
PLATELET # BLD AUTO: 325 K/UL — SIGNIFICANT CHANGE UP (ref 130–400)
PLATELET # BLD AUTO: 328 K/UL — SIGNIFICANT CHANGE UP (ref 130–400)
PO2 BLDV: 42 MMHG — HIGH (ref 20–40)
POCT INR: 1.3 RATIO — HIGH (ref 0.9–1.2)
POCT INR: 1.7 RATIO — HIGH (ref 0.9–1.2)
POCT INR: 1.8 RATIO — HIGH (ref 0.9–1.2)
POCT INR: 1.9 RATIO — HIGH (ref 0.9–1.2)
POCT INR: 2 RATIO — HIGH (ref 0.9–1.2)
POCT INR: 2 RATIO — HIGH (ref 0.9–1.2)
POCT INR: 2.1 RATIO — HIGH (ref 0.9–1.2)
POCT INR: 2.1 RATIO — HIGH (ref 0.9–1.2)
POCT INR: 2.4 RATIO — HIGH (ref 0.9–1.2)
POCT INR: 2.5 RATIO — HIGH (ref 0.9–1.2)
POCT INR: 2.6 RATIO — HIGH (ref 0.9–1.2)
POCT INR: 2.9 RATIO — HIGH (ref 0.9–1.2)
POCT INR: 2.9 RATIO — HIGH (ref 0.9–1.2)
POCT INR: 3.2 RATIO — HIGH (ref 0.9–1.2)
POCT INR: 3.3 RATIO — HIGH (ref 0.9–1.2)
POCT INR: 3.4 RATIO — HIGH (ref 0.9–1.2)
POCT INR: 3.8 RATIO — HIGH (ref 0.9–1.2)
POCT INR: 3.9 RATIO — HIGH (ref 0.9–1.2)
POCT PT: 15.3 SEC — HIGH (ref 10–13.4)
POCT PT: 20.7 SEC — HIGH (ref 10–13.4)
POCT PT: 21 SEC — HIGH (ref 10–13.4)
POCT PT: 22.2 SEC — HIGH (ref 10–13.4)
POCT PT: 24.1 SEC — HIGH (ref 10–13.4)
POCT PT: 24.2 SEC — HIGH (ref 10–13.4)
POCT PT: 25.1 SEC — HIGH (ref 10–13.4)
POCT PT: 25.7 SEC — HIGH (ref 10–13.4)
POCT PT: 28.5 SEC — HIGH (ref 10–13.4)
POCT PT: 29.5 SEC — HIGH (ref 10–13.4)
POCT PT: 30.7 SEC — HIGH (ref 10–13.4)
POCT PT: 34.8 SEC — HIGH (ref 10–13.4)
POCT PT: 35 SEC — HIGH (ref 10–13.4)
POCT PT: 38 SEC — HIGH (ref 10–13.4)
POCT PT: 39.7 SEC — HIGH (ref 10–13.4)
POCT PT: 41.3 SEC — HIGH (ref 10–13.4)
POCT PT: 46.2 SEC — HIGH (ref 10–13.4)
POCT PT: 47.3 SEC — HIGH (ref 10–13.4)
POCT-PROTHROMBIN TIME: 15.3 SECS
POCT-PROTHROMBIN TIME: 20.7 SECS
POCT-PROTHROMBIN TIME: 21 SECS
POCT-PROTHROMBIN TIME: 22.2 SECS
POCT-PROTHROMBIN TIME: 24.1 SECS
POCT-PROTHROMBIN TIME: 24.2 SECS
POCT-PROTHROMBIN TIME: 25.1 SECS
POCT-PROTHROMBIN TIME: 25.7 SECS
POCT-PROTHROMBIN TIME: 28.5 SECS
POCT-PROTHROMBIN TIME: 29.5 SECS
POCT-PROTHROMBIN TIME: 30.7 SECS
POCT-PROTHROMBIN TIME: 34.8 SECS
POCT-PROTHROMBIN TIME: 35 SECS
POCT-PROTHROMBIN TIME: 38 SECS
POCT-PROTHROMBIN TIME: 39.7 SECS
POCT-PROTHROMBIN TIME: 41.3 SECS
POCT-PROTHROMBIN TIME: 46.2 SECS
POTASSIUM BLDV-SCNC: 4.1 MMOL/L — SIGNIFICANT CHANGE UP (ref 3.3–5.6)
POTASSIUM SERPL-MCNC: 4.5 MMOL/L — SIGNIFICANT CHANGE UP (ref 3.5–5)
POTASSIUM SERPL-MCNC: 4.5 MMOL/L — SIGNIFICANT CHANGE UP (ref 3.5–5)
POTASSIUM SERPL-MCNC: 4.6 MMOL/L — SIGNIFICANT CHANGE UP (ref 3.5–5)
POTASSIUM SERPL-MCNC: 4.6 MMOL/L — SIGNIFICANT CHANGE UP (ref 3.5–5)
POTASSIUM SERPL-MCNC: 4.8 MMOL/L — SIGNIFICANT CHANGE UP (ref 3.5–5)
POTASSIUM SERPL-MCNC: 4.9 MMOL/L — SIGNIFICANT CHANGE UP (ref 3.5–5)
POTASSIUM SERPL-MCNC: 5 MMOL/L — SIGNIFICANT CHANGE UP (ref 3.5–5)
POTASSIUM SERPL-MCNC: 5 MMOL/L — SIGNIFICANT CHANGE UP (ref 3.5–5)
POTASSIUM SERPL-MCNC: 5.1 MMOL/L — HIGH (ref 3.5–5)
POTASSIUM SERPL-MCNC: 6 MMOL/L — CRITICAL HIGH (ref 3.5–5)
POTASSIUM SERPL-SCNC: 4.5 MMOL/L — SIGNIFICANT CHANGE UP (ref 3.5–5)
POTASSIUM SERPL-SCNC: 4.5 MMOL/L — SIGNIFICANT CHANGE UP (ref 3.5–5)
POTASSIUM SERPL-SCNC: 4.6 MMOL/L — SIGNIFICANT CHANGE UP (ref 3.5–5)
POTASSIUM SERPL-SCNC: 4.6 MMOL/L — SIGNIFICANT CHANGE UP (ref 3.5–5)
POTASSIUM SERPL-SCNC: 4.8 MMOL/L — SIGNIFICANT CHANGE UP (ref 3.5–5)
POTASSIUM SERPL-SCNC: 4.9 MMOL/L — SIGNIFICANT CHANGE UP (ref 3.5–5)
POTASSIUM SERPL-SCNC: 5 MMOL/L — SIGNIFICANT CHANGE UP (ref 3.5–5)
POTASSIUM SERPL-SCNC: 5 MMOL/L — SIGNIFICANT CHANGE UP (ref 3.5–5)
POTASSIUM SERPL-SCNC: 5.1 MMOL/L — HIGH (ref 3.5–5)
POTASSIUM SERPL-SCNC: 6 MMOL/L — CRITICAL HIGH (ref 3.5–5)
PROT SERPL-MCNC: 5.4 G/DL — LOW (ref 6–8)
PROT SERPL-MCNC: 6.2 G/DL — SIGNIFICANT CHANGE UP (ref 6–8)
PROT SERPL-MCNC: 6.3 G/DL — SIGNIFICANT CHANGE UP (ref 6–8)
PROT SERPL-MCNC: 6.4 G/DL — SIGNIFICANT CHANGE UP (ref 6–8)
PROT UR-MCNC: NEGATIVE — SIGNIFICANT CHANGE UP
PROTHROM AB SERPL-ACNC: 15.6 SEC — HIGH (ref 9.95–12.87)
PROTHROM AB SERPL-ACNC: 16.8 SEC — HIGH (ref 9.95–12.87)
PROTHROM AB SERPL-ACNC: 18 SEC — HIGH (ref 9.95–12.87)
PROTHROM AB SERPL-ACNC: 20.4 SEC — HIGH (ref 9.95–12.87)
PROTHROM AB SERPL-ACNC: 20.6 SEC — HIGH (ref 9.95–12.87)
PROTHROM AB SERPL-ACNC: 22.1 SEC — HIGH (ref 9.95–12.87)
PROTHROM AB SERPL-ACNC: 22.6 SEC — HIGH (ref 9.95–12.87)
PROTHROM AB SERPL-ACNC: 29.6 SEC — HIGH (ref 9.95–12.87)
PROTHROM AB SERPL-ACNC: 38.6 SEC — HIGH (ref 9.95–12.87)
PROTHROM AB SERPL-ACNC: >40 SEC — HIGH (ref 9.95–12.87)
PT BLD: 20.6
QUALITY CONTROL: YES
RBC # BLD: 4.05 M/UL — LOW (ref 4.2–5.4)
RBC # BLD: 4.06 M/UL — LOW (ref 4.2–5.4)
RBC # BLD: 4.12 M/UL — LOW (ref 4.2–5.4)
RBC # BLD: 4.15 M/UL — LOW (ref 4.2–5.4)
RBC # BLD: 4.17 M/UL — LOW (ref 4.2–5.4)
RBC # BLD: 4.18 M/UL — LOW (ref 4.2–5.4)
RBC # BLD: 4.19 M/UL — LOW (ref 4.2–5.4)
RBC # BLD: 4.21 M/UL — SIGNIFICANT CHANGE UP (ref 4.2–5.4)
RBC # BLD: 4.27 M/UL — SIGNIFICANT CHANGE UP (ref 4.2–5.4)
RBC # BLD: 4.33 M/UL — SIGNIFICANT CHANGE UP (ref 4.2–5.4)
RBC # BLD: 4.44 M/UL — SIGNIFICANT CHANGE UP (ref 4.2–5.4)
RBC # FLD: 14.5 % — SIGNIFICANT CHANGE UP (ref 11.5–14.5)
RBC # FLD: 14.5 % — SIGNIFICANT CHANGE UP (ref 11.5–14.5)
RBC # FLD: 14.6 % — HIGH (ref 11.5–14.5)
RBC # FLD: 14.7 % — HIGH (ref 11.5–14.5)
RBC # FLD: 14.7 % — HIGH (ref 11.5–14.5)
RBC # FLD: 14.8 % — HIGH (ref 11.5–14.5)
SAO2 % BLDV: 78 % — SIGNIFICANT CHANGE UP
SARS-COV-2 RNA SPEC QL NAA+PROBE: SIGNIFICANT CHANGE UP
SARS-COV-2 RNA SPEC QL NAA+PROBE: SIGNIFICANT CHANGE UP
SODIUM SERPL-SCNC: 133 MMOL/L — LOW (ref 135–146)
SODIUM SERPL-SCNC: 134 MMOL/L — LOW (ref 135–146)
SODIUM SERPL-SCNC: 134 MMOL/L — LOW (ref 135–146)
SODIUM SERPL-SCNC: 135 MMOL/L — SIGNIFICANT CHANGE UP (ref 135–146)
SODIUM SERPL-SCNC: 138 MMOL/L — SIGNIFICANT CHANGE UP (ref 135–146)
SODIUM SERPL-SCNC: 139 MMOL/L — SIGNIFICANT CHANGE UP (ref 135–146)
SODIUM SERPL-SCNC: 139 MMOL/L — SIGNIFICANT CHANGE UP (ref 135–146)
SODIUM SERPL-SCNC: 141 MMOL/L — SIGNIFICANT CHANGE UP (ref 135–146)
SP GR SPEC: 1.02 — SIGNIFICANT CHANGE UP (ref 1.01–1.02)
T4 AB SER-ACNC: 6.2 UG/DL — SIGNIFICANT CHANGE UP (ref 4.6–12)
TROPONIN T SERPL-MCNC: 0.01 NG/ML — SIGNIFICANT CHANGE UP
TSH SERPL-MCNC: 0.56 UIU/ML — SIGNIFICANT CHANGE UP (ref 0.27–4.2)
UROBILINOGEN FLD QL: SIGNIFICANT CHANGE UP
WBC # BLD: 10.18 K/UL — SIGNIFICANT CHANGE UP (ref 4.8–10.8)
WBC # BLD: 10.25 K/UL — SIGNIFICANT CHANGE UP (ref 4.8–10.8)
WBC # BLD: 11.12 K/UL — HIGH (ref 4.8–10.8)
WBC # BLD: 11.82 K/UL — HIGH (ref 4.8–10.8)
WBC # BLD: 11.9 K/UL — HIGH (ref 4.8–10.8)
WBC # BLD: 12.26 K/UL — HIGH (ref 4.8–10.8)
WBC # BLD: 14.15 K/UL — HIGH (ref 4.8–10.8)
WBC # BLD: 14.9 K/UL — HIGH (ref 4.8–10.8)
WBC # BLD: 15.55 K/UL — HIGH (ref 4.8–10.8)
WBC # BLD: 8.73 K/UL — SIGNIFICANT CHANGE UP (ref 4.8–10.8)
WBC # BLD: 9.44 K/UL — SIGNIFICANT CHANGE UP (ref 4.8–10.8)
WBC # FLD AUTO: 10.18 K/UL — SIGNIFICANT CHANGE UP (ref 4.8–10.8)
WBC # FLD AUTO: 10.25 K/UL — SIGNIFICANT CHANGE UP (ref 4.8–10.8)
WBC # FLD AUTO: 11.12 K/UL — HIGH (ref 4.8–10.8)
WBC # FLD AUTO: 11.82 K/UL — HIGH (ref 4.8–10.8)
WBC # FLD AUTO: 11.9 K/UL — HIGH (ref 4.8–10.8)
WBC # FLD AUTO: 12.26 K/UL — HIGH (ref 4.8–10.8)
WBC # FLD AUTO: 14.15 K/UL — HIGH (ref 4.8–10.8)
WBC # FLD AUTO: 14.9 K/UL — HIGH (ref 4.8–10.8)
WBC # FLD AUTO: 15.55 K/UL — HIGH (ref 4.8–10.8)
WBC # FLD AUTO: 8.73 K/UL — SIGNIFICANT CHANGE UP (ref 4.8–10.8)
WBC # FLD AUTO: 9.44 K/UL — SIGNIFICANT CHANGE UP (ref 4.8–10.8)

## 2020-01-01 PROCEDURE — 99233 SBSQ HOSP IP/OBS HIGH 50: CPT

## 2020-01-01 PROCEDURE — 99223 1ST HOSP IP/OBS HIGH 75: CPT

## 2020-01-01 PROCEDURE — 99222 1ST HOSP IP/OBS MODERATE 55: CPT

## 2020-01-01 PROCEDURE — 93306 TTE W/DOPPLER COMPLETE: CPT | Mod: 26

## 2020-01-01 PROCEDURE — 99232 SBSQ HOSP IP/OBS MODERATE 35: CPT | Mod: 57

## 2020-01-01 PROCEDURE — 93288 INTERROG EVL PM/LDLS PM IP: CPT | Mod: 26

## 2020-01-01 PROCEDURE — 71045 X-RAY EXAM CHEST 1 VIEW: CPT | Mod: 26

## 2020-01-01 PROCEDURE — 99239 HOSP IP/OBS DSCHRG MGMT >30: CPT

## 2020-01-01 PROCEDURE — 93010 ELECTROCARDIOGRAM REPORT: CPT

## 2020-01-01 PROCEDURE — 33208 INSRT HEART PM ATRIAL & VENT: CPT | Mod: KX

## 2020-01-01 PROCEDURE — 99347 HOME/RES VST EST SF MDM 20: CPT | Mod: 95

## 2020-01-01 PROCEDURE — 99214 OFFICE O/P EST MOD 30 MIN: CPT

## 2020-01-01 PROCEDURE — 93280 PM DEVICE PROGR EVAL DUAL: CPT

## 2020-01-01 PROCEDURE — 99291 CRITICAL CARE FIRST HOUR: CPT | Mod: CS

## 2020-01-01 PROCEDURE — 99232 SBSQ HOSP IP/OBS MODERATE 35: CPT | Mod: 25

## 2020-01-01 PROCEDURE — 71046 X-RAY EXAM CHEST 2 VIEWS: CPT | Mod: 26

## 2020-01-01 RX ORDER — WARFARIN SODIUM 2.5 MG/1
2.5 TABLET ORAL ONCE
Refills: 0 | Status: COMPLETED | OUTPATIENT
Start: 2020-01-01 | End: 2020-01-01

## 2020-01-01 RX ORDER — HYDRALAZINE HCL 50 MG
10 TABLET ORAL DAILY
Refills: 0 | Status: DISCONTINUED | OUTPATIENT
Start: 2020-01-01 | End: 2020-01-01

## 2020-01-01 RX ORDER — WARFARIN SODIUM 2.5 MG/1
5 TABLET ORAL ONCE
Refills: 0 | Status: COMPLETED | OUTPATIENT
Start: 2020-01-01 | End: 2020-01-01

## 2020-01-01 RX ORDER — PAROXETINE HYDROCHLORIDE 10 MG/1
10 TABLET, FILM COATED ORAL DAILY
Refills: 0 | Status: COMPLETED | COMMUNITY
Start: 2020-01-01 | End: 2020-01-01

## 2020-01-01 RX ORDER — DILTIAZEM HCL 120 MG
120 CAPSULE, EXT RELEASE 24 HR ORAL ONCE
Refills: 0 | Status: COMPLETED | OUTPATIENT
Start: 2020-01-01 | End: 2020-01-01

## 2020-01-01 RX ORDER — WARFARIN SODIUM 2.5 MG/1
0 TABLET ORAL
Qty: 0 | Refills: 0 | DISCHARGE

## 2020-01-01 RX ORDER — POLYETHYLENE GLYCOL 3350 17 G/17G
17 POWDER, FOR SOLUTION ORAL
Qty: 255 | Refills: 0
Start: 2020-01-01 | End: 2020-01-01

## 2020-01-01 RX ORDER — ACTIVATED CHARCOAL 25 G/120ML
50 SUSPENSION, ORAL (FINAL DOSE FORM) ORAL ONCE
Refills: 0 | Status: DISCONTINUED | OUTPATIENT
Start: 2020-01-01 | End: 2020-01-01

## 2020-01-01 RX ORDER — DILTIAZEM HCL 120 MG
180 CAPSULE, EXT RELEASE 24 HR ORAL DAILY
Refills: 0 | Status: DISCONTINUED | OUTPATIENT
Start: 2020-01-01 | End: 2020-01-01

## 2020-01-01 RX ORDER — SODIUM CHLORIDE 9 MG/ML
1000 INJECTION, SOLUTION INTRAVENOUS
Refills: 0 | Status: DISCONTINUED | OUTPATIENT
Start: 2020-01-01 | End: 2020-01-01

## 2020-01-01 RX ORDER — METOPROLOL TARTRATE 50 MG
1 TABLET ORAL
Qty: 60 | Refills: 0
Start: 2020-01-01 | End: 2020-01-01

## 2020-01-01 RX ORDER — HYDRALAZINE HCL 50 MG
1 TABLET ORAL
Qty: 90 | Refills: 0
Start: 2020-01-01 | End: 2020-01-01

## 2020-01-01 RX ORDER — WARFARIN SODIUM 2.5 MG/1
1 TABLET ORAL
Qty: 0 | Refills: 0 | DISCHARGE

## 2020-01-01 RX ORDER — ENALAPRIL MALEATE 10 MG/1
10 TABLET ORAL TWICE DAILY
Refills: 0 | Status: ACTIVE | COMMUNITY

## 2020-01-01 RX ORDER — DEXTROSE 50 % IN WATER 50 %
12.5 SYRINGE (ML) INTRAVENOUS ONCE
Refills: 0 | Status: DISCONTINUED | OUTPATIENT
Start: 2020-01-01 | End: 2020-01-01

## 2020-01-01 RX ORDER — VANCOMYCIN HCL 1 G
1000 VIAL (EA) INTRAVENOUS ONCE
Refills: 0 | Status: COMPLETED | OUTPATIENT
Start: 2020-01-01 | End: 2020-01-01

## 2020-01-01 RX ORDER — DILTIAZEM HCL 120 MG
10 CAPSULE, EXT RELEASE 24 HR ORAL ONCE
Refills: 0 | Status: COMPLETED | OUTPATIENT
Start: 2020-01-01 | End: 2020-01-01

## 2020-01-01 RX ORDER — ACETAMINOPHEN 500 MG
2 TABLET ORAL
Qty: 0 | Refills: 0 | DISCHARGE
Start: 2020-01-01

## 2020-01-01 RX ORDER — HYDRALAZINE HCL 50 MG
25 TABLET ORAL THREE TIMES A DAY
Refills: 0 | Status: DISCONTINUED | OUTPATIENT
Start: 2020-01-01 | End: 2020-01-01

## 2020-01-01 RX ORDER — HYDRALAZINE HCL 50 MG
10 TABLET ORAL ONCE
Refills: 0 | Status: COMPLETED | OUTPATIENT
Start: 2020-01-01 | End: 2020-01-01

## 2020-01-01 RX ORDER — DILTIAZEM HCL 120 MG
1 CAPSULE, EXT RELEASE 24 HR ORAL
Qty: 0 | Refills: 0 | DISCHARGE

## 2020-01-01 RX ORDER — METOPROLOL TARTRATE 25 MG/1
25 TABLET, FILM COATED ORAL
Refills: 0 | Status: ACTIVE | COMMUNITY

## 2020-01-01 RX ORDER — INSULIN HUMAN 100 [IU]/ML
10 INJECTION, SOLUTION SUBCUTANEOUS ONCE
Refills: 0 | Status: COMPLETED | OUTPATIENT
Start: 2020-01-01 | End: 2020-01-01

## 2020-01-01 RX ORDER — ALBUTEROL 90 UG/1
2 AEROSOL, METERED ORAL EVERY 6 HOURS
Refills: 0 | Status: DISCONTINUED | OUTPATIENT
Start: 2020-01-01 | End: 2020-01-01

## 2020-01-01 RX ORDER — METOPROLOL TARTRATE 50 MG
25 TABLET ORAL
Refills: 0 | Status: DISCONTINUED | OUTPATIENT
Start: 2020-01-01 | End: 2020-01-01

## 2020-01-01 RX ORDER — HYDRALAZINE HCL 50 MG
25 TABLET ORAL ONCE
Refills: 0 | Status: COMPLETED | OUTPATIENT
Start: 2020-01-01 | End: 2020-01-01

## 2020-01-01 RX ORDER — DILTIAZEM HYDROCHLORIDE 240 MG/1
240 CAPSULE, EXTENDED RELEASE ORAL DAILY
Refills: 0 | Status: ACTIVE | COMMUNITY

## 2020-01-01 RX ORDER — ZOLPIDEM TARTRATE 10 MG/1
1 TABLET ORAL
Qty: 5 | Refills: 0
Start: 2020-01-01 | End: 2020-01-01

## 2020-01-01 RX ORDER — DEXTROSE 50 % IN WATER 50 %
25 SYRINGE (ML) INTRAVENOUS ONCE
Refills: 0 | Status: DISCONTINUED | OUTPATIENT
Start: 2020-01-01 | End: 2020-01-01

## 2020-01-01 RX ORDER — METOPROLOL TARTRATE 50 MG
1 TABLET ORAL
Qty: 0 | Refills: 0 | DISCHARGE
Start: 2020-01-01

## 2020-01-01 RX ORDER — HYDRALAZINE HYDROCHLORIDE 25 MG/1
25 TABLET ORAL 3 TIMES DAILY
Refills: 0 | Status: COMPLETED | COMMUNITY
End: 2020-01-01

## 2020-01-01 RX ORDER — PANTOPRAZOLE SODIUM 20 MG/1
1 TABLET, DELAYED RELEASE ORAL
Qty: 0 | Refills: 0 | DISCHARGE
Start: 2020-01-01

## 2020-01-01 RX ORDER — PREDNISONE 20 MG/1
20 TABLET ORAL
Qty: 9 | Refills: 0 | Status: COMPLETED | COMMUNITY
Start: 2019-09-13 | End: 2020-01-01

## 2020-01-01 RX ORDER — ALBUTEROL 90 UG/1
2 AEROSOL, METERED ORAL
Refills: 0 | Status: DISCONTINUED | OUTPATIENT
Start: 2020-01-01 | End: 2020-01-01

## 2020-01-01 RX ORDER — DILTIAZEM HCL 120 MG
120 CAPSULE, EXT RELEASE 24 HR ORAL DAILY
Refills: 0 | Status: DISCONTINUED | OUTPATIENT
Start: 2020-01-01 | End: 2020-01-01

## 2020-01-01 RX ORDER — FLUTICASONE FUROATE, UMECLIDINIUM BROMIDE AND VILANTEROL TRIFENATATE 200; 62.5; 25 UG/1; UG/1; UG/1
POWDER RESPIRATORY (INHALATION)
Refills: 0 | Status: ACTIVE | COMMUNITY

## 2020-01-01 RX ORDER — PANTOPRAZOLE SODIUM 20 MG/1
1 TABLET, DELAYED RELEASE ORAL
Qty: 5 | Refills: 0
Start: 2020-01-01 | End: 2020-01-01

## 2020-01-01 RX ORDER — NIFEDIPINE 30 MG
30 TABLET, EXTENDED RELEASE 24 HR ORAL DAILY
Refills: 0 | Status: DISCONTINUED | OUTPATIENT
Start: 2020-01-01 | End: 2020-01-01

## 2020-01-01 RX ORDER — ZOLPIDEM TARTRATE 10 MG/1
5 TABLET ORAL AT BEDTIME
Refills: 0 | Status: DISCONTINUED | OUTPATIENT
Start: 2020-01-01 | End: 2020-01-01

## 2020-01-01 RX ORDER — PANTOPRAZOLE SODIUM 20 MG/1
40 TABLET, DELAYED RELEASE ORAL
Refills: 0 | Status: DISCONTINUED | OUTPATIENT
Start: 2020-01-01 | End: 2020-01-01

## 2020-01-01 RX ORDER — ZALEPLON 10 MG
5 CAPSULE ORAL ONCE
Refills: 0 | Status: DISCONTINUED | OUTPATIENT
Start: 2020-01-01 | End: 2020-01-01

## 2020-01-01 RX ORDER — ACETAMINOPHEN 500 MG
650 TABLET ORAL EVERY 6 HOURS
Refills: 0 | Status: DISCONTINUED | OUTPATIENT
Start: 2020-01-01 | End: 2020-01-01

## 2020-01-01 RX ORDER — CHROMIUM 200 MCG
1000 TABLET ORAL DAILY
Refills: 0 | Status: COMPLETED | COMMUNITY
End: 2020-01-01

## 2020-01-01 RX ORDER — INSULIN HUMAN 100 [IU]/ML
10 INJECTION, SOLUTION SUBCUTANEOUS ONCE
Refills: 0 | Status: DISCONTINUED | OUTPATIENT
Start: 2020-01-01 | End: 2020-01-01

## 2020-01-01 RX ORDER — HYDRALAZINE HYDROCHLORIDE 25 MG/1
25 TABLET ORAL
Refills: 0 | Status: ACTIVE | COMMUNITY

## 2020-01-01 RX ORDER — ATORVASTATIN CALCIUM 80 MG/1
10 TABLET, FILM COATED ORAL AT BEDTIME
Refills: 0 | Status: DISCONTINUED | OUTPATIENT
Start: 2020-01-01 | End: 2020-01-01

## 2020-01-01 RX ORDER — SENNA PLUS 8.6 MG/1
2 TABLET ORAL
Qty: 30 | Refills: 0
Start: 2020-01-01 | End: 2020-01-01

## 2020-01-01 RX ORDER — ALBUTEROL SULFATE 90 UG/1
108 (90 BASE) AEROSOL, METERED RESPIRATORY (INHALATION)
Qty: 18 | Refills: 0 | Status: COMPLETED | COMMUNITY
Start: 2019-09-13 | End: 2020-01-01

## 2020-01-01 RX ORDER — DIGOXIN 250 MCG
0.25 TABLET ORAL
Qty: 0 | Refills: 0 | DISCHARGE

## 2020-01-01 RX ORDER — ENOXAPARIN SODIUM 100 MG/ML
60 INJECTION SUBCUTANEOUS EVERY 12 HOURS
Refills: 0 | Status: DISCONTINUED | OUTPATIENT
Start: 2020-01-01 | End: 2020-01-01

## 2020-01-01 RX ORDER — DILTIAZEM HCL 120 MG
1 CAPSULE, EXT RELEASE 24 HR ORAL
Qty: 30 | Refills: 0
Start: 2020-01-01 | End: 2020-01-01

## 2020-01-01 RX ORDER — GLUCAGON INJECTION, SOLUTION 0.5 MG/.1ML
1 INJECTION, SOLUTION SUBCUTANEOUS ONCE
Refills: 0 | Status: DISCONTINUED | OUTPATIENT
Start: 2020-01-01 | End: 2020-01-01

## 2020-01-01 RX ORDER — BUDESONIDE AND FORMOTEROL FUMARATE DIHYDRATE 160; 4.5 UG/1; UG/1
160-4.5 AEROSOL RESPIRATORY (INHALATION)
Refills: 0 | Status: COMPLETED | COMMUNITY
Start: 2020-01-01 | End: 2020-01-01

## 2020-01-01 RX ORDER — CHROMIUM 200 MCG
1000 TABLET ORAL
Refills: 0 | Status: ACTIVE | COMMUNITY

## 2020-01-01 RX ORDER — IPRATROPIUM/ALBUTEROL SULFATE 18-103MCG
3 AEROSOL WITH ADAPTER (GRAM) INHALATION
Refills: 0 | Status: COMPLETED | OUTPATIENT
Start: 2020-01-01 | End: 2020-01-01

## 2020-01-01 RX ORDER — HYDRALAZINE HCL 50 MG
1 TABLET ORAL
Qty: 0 | Refills: 0 | DISCHARGE
Start: 2020-01-01

## 2020-01-01 RX ORDER — DILTIAZEM HCL 120 MG
240 CAPSULE, EXT RELEASE 24 HR ORAL DAILY
Refills: 0 | Status: DISCONTINUED | OUTPATIENT
Start: 2020-01-01 | End: 2020-01-01

## 2020-01-01 RX ORDER — LISINOPRIL 2.5 MG/1
10 TABLET ORAL DAILY
Refills: 0 | Status: DISCONTINUED | OUTPATIENT
Start: 2020-01-01 | End: 2020-01-01

## 2020-01-01 RX ORDER — TIOTROPIUM BROMIDE 18 UG/1
1 CAPSULE ORAL; RESPIRATORY (INHALATION) DAILY
Refills: 0 | Status: DISCONTINUED | OUTPATIENT
Start: 2020-01-01 | End: 2020-01-01

## 2020-01-01 RX ORDER — ASPIRIN/CALCIUM CARB/MAGNESIUM 324 MG
1 TABLET ORAL
Qty: 0 | Refills: 0 | DISCHARGE

## 2020-01-01 RX ORDER — VANCOMYCIN HCL 1 G
1000 VIAL (EA) INTRAVENOUS EVERY 12 HOURS
Refills: 0 | Status: DISCONTINUED | OUTPATIENT
Start: 2020-01-01 | End: 2020-01-01

## 2020-01-01 RX ORDER — WARFARIN SODIUM 2.5 MG/1
5 TABLET ORAL DAILY
Refills: 0 | Status: DISCONTINUED | OUTPATIENT
Start: 2020-01-01 | End: 2020-01-01

## 2020-01-01 RX ORDER — FLUTICASONE FUROATE AND VILANTEROL TRIFENATATE 100; 25 UG/1; UG/1
1 POWDER RESPIRATORY (INHALATION)
Qty: 0 | Refills: 0 | DISCHARGE

## 2020-01-01 RX ORDER — LISINOPRIL 2.5 MG/1
20 TABLET ORAL DAILY
Refills: 0 | Status: DISCONTINUED | OUTPATIENT
Start: 2020-01-01 | End: 2020-01-01

## 2020-01-01 RX ORDER — ZOLPIDEM TARTRATE 5 MG/1
5 TABLET, FILM COATED ORAL
Refills: 0 | Status: ACTIVE | COMMUNITY

## 2020-01-01 RX ORDER — PANTOPRAZOLE 40 MG/1
40 TABLET, DELAYED RELEASE ORAL
Qty: 90 | Refills: 0 | Status: ACTIVE | COMMUNITY
Start: 2020-01-01

## 2020-01-01 RX ORDER — DILTIAZEM HCL 120 MG
1 CAPSULE, EXT RELEASE 24 HR ORAL
Qty: 0 | Refills: 0 | DISCHARGE
Start: 2020-01-01

## 2020-01-01 RX ORDER — DIGOXIN 250 MCG
0.25 TABLET ORAL DAILY
Refills: 0 | Status: DISCONTINUED | OUTPATIENT
Start: 2020-01-01 | End: 2020-01-01

## 2020-01-01 RX ORDER — NIFEDIPINE 30 MG
10 TABLET, EXTENDED RELEASE 24 HR ORAL ONCE
Refills: 0 | Status: COMPLETED | OUTPATIENT
Start: 2020-01-01 | End: 2020-01-01

## 2020-01-01 RX ORDER — IPRATROPIUM/ALBUTEROL SULFATE 18-103MCG
3 AEROSOL WITH ADAPTER (GRAM) INHALATION EVERY 6 HOURS
Refills: 0 | Status: DISCONTINUED | OUTPATIENT
Start: 2020-01-01 | End: 2020-01-01

## 2020-01-01 RX ORDER — DEXTROSE 10 % IN WATER 10 %
250 INTRAVENOUS SOLUTION INTRAVENOUS
Refills: 0 | Status: DISCONTINUED | OUTPATIENT
Start: 2020-01-01 | End: 2020-01-01

## 2020-01-01 RX ORDER — DEXTROSE 50 % IN WATER 50 %
15 SYRINGE (ML) INTRAVENOUS ONCE
Refills: 0 | Status: DISCONTINUED | OUTPATIENT
Start: 2020-01-01 | End: 2020-01-01

## 2020-01-01 RX ORDER — METOPROLOL SUCCINATE 25 MG/1
25 TABLET, EXTENDED RELEASE ORAL TWICE DAILY
Refills: 0 | Status: ACTIVE | COMMUNITY
Start: 2020-01-01

## 2020-01-01 RX ORDER — HYDROCHLOROTHIAZIDE 25 MG
25 TABLET ORAL DAILY
Refills: 0 | Status: DISCONTINUED | OUTPATIENT
Start: 2020-01-01 | End: 2020-01-01

## 2020-01-01 RX ORDER — BUDESONIDE AND FORMOTEROL FUMARATE DIHYDRATE 160; 4.5 UG/1; UG/1
2 AEROSOL RESPIRATORY (INHALATION)
Refills: 0 | Status: DISCONTINUED | OUTPATIENT
Start: 2020-01-01 | End: 2020-01-01

## 2020-01-01 RX ORDER — PREDNISONE 20 MG/1
20 TABLET ORAL
Refills: 0 | Status: ACTIVE | COMMUNITY

## 2020-01-01 RX ORDER — STANDARDIZED SENNA CONCENTRATE 8.6 MG/1
TABLET ORAL
Refills: 0 | Status: ACTIVE | COMMUNITY

## 2020-01-01 RX ORDER — ASPIRIN/CALCIUM CARB/MAGNESIUM 324 MG
81 TABLET ORAL DAILY
Refills: 0 | Status: DISCONTINUED | OUTPATIENT
Start: 2020-01-01 | End: 2020-01-01

## 2020-01-01 RX ORDER — WARFARIN SODIUM 2.5 MG/1
5 TABLET ORAL AT BEDTIME
Refills: 0 | Status: DISCONTINUED | OUTPATIENT
Start: 2020-01-01 | End: 2020-01-01

## 2020-01-01 RX ORDER — MAGNESIUM SULFATE 500 MG/ML
2 VIAL (ML) INJECTION ONCE
Refills: 0 | Status: COMPLETED | OUTPATIENT
Start: 2020-01-01 | End: 2020-01-01

## 2020-01-01 RX ORDER — FLUTICASONE FUROATE, UMECLIDINIUM BROMIDE AND VILANTEROL TRIFENATATE 200; 62.5; 25 UG/1; UG/1; UG/1
1 POWDER RESPIRATORY (INHALATION)
Qty: 0 | Refills: 0 | DISCHARGE

## 2020-01-01 RX ADMIN — Medication 650 MILLIGRAM(S): at 11:20

## 2020-01-01 RX ADMIN — BUDESONIDE AND FORMOTEROL FUMARATE DIHYDRATE 2 PUFF(S): 160; 4.5 AEROSOL RESPIRATORY (INHALATION) at 08:34

## 2020-01-01 RX ADMIN — LISINOPRIL 10 MILLIGRAM(S): 2.5 TABLET ORAL at 07:34

## 2020-01-01 RX ADMIN — Medication 25 MILLIGRAM(S): at 06:17

## 2020-01-01 RX ADMIN — Medication 180 MILLIGRAM(S): at 15:53

## 2020-01-01 RX ADMIN — Medication 3 MILLILITER(S): at 08:47

## 2020-01-01 RX ADMIN — Medication 3 MILLILITER(S): at 15:15

## 2020-01-01 RX ADMIN — Medication 25 MILLIGRAM(S): at 09:05

## 2020-01-01 RX ADMIN — Medication 110 MILLIGRAM(S): at 17:30

## 2020-01-01 RX ADMIN — Medication 3 MILLILITER(S): at 08:12

## 2020-01-01 RX ADMIN — Medication 240 MILLIGRAM(S): at 14:58

## 2020-01-01 RX ADMIN — Medication 20 MILLIGRAM(S): at 06:16

## 2020-01-01 RX ADMIN — Medication 5 MILLIGRAM(S): at 17:00

## 2020-01-01 RX ADMIN — Medication 81 MILLIGRAM(S): at 13:16

## 2020-01-01 RX ADMIN — Medication 250 MILLIGRAM(S): at 07:30

## 2020-01-01 RX ADMIN — BUDESONIDE AND FORMOTEROL FUMARATE DIHYDRATE 2 PUFF(S): 160; 4.5 AEROSOL RESPIRATORY (INHALATION) at 19:38

## 2020-01-01 RX ADMIN — Medication 25 MILLIGRAM(S): at 06:11

## 2020-01-01 RX ADMIN — ATORVASTATIN CALCIUM 10 MILLIGRAM(S): 80 TABLET, FILM COATED ORAL at 21:56

## 2020-01-01 RX ADMIN — Medication 25 MILLIGRAM(S): at 13:24

## 2020-01-01 RX ADMIN — TIOTROPIUM BROMIDE 1 CAPSULE(S): 18 CAPSULE ORAL; RESPIRATORY (INHALATION) at 07:55

## 2020-01-01 RX ADMIN — Medication 250 MILLIGRAM(S): at 09:05

## 2020-01-01 RX ADMIN — Medication 100 MILLIGRAM(S): at 17:40

## 2020-01-01 RX ADMIN — BUDESONIDE AND FORMOTEROL FUMARATE DIHYDRATE 2 PUFF(S): 160; 4.5 AEROSOL RESPIRATORY (INHALATION) at 20:34

## 2020-01-01 RX ADMIN — Medication 110 MILLIGRAM(S): at 05:37

## 2020-01-01 RX ADMIN — Medication 40 MILLIGRAM(S): at 06:06

## 2020-01-01 RX ADMIN — Medication 25 MILLIGRAM(S): at 12:39

## 2020-01-01 RX ADMIN — BUDESONIDE AND FORMOTEROL FUMARATE DIHYDRATE 2 PUFF(S): 160; 4.5 AEROSOL RESPIRATORY (INHALATION) at 21:19

## 2020-01-01 RX ADMIN — Medication 3 MILLILITER(S): at 20:23

## 2020-01-01 RX ADMIN — Medication 110 MILLIGRAM(S): at 17:21

## 2020-01-01 RX ADMIN — PANTOPRAZOLE SODIUM 40 MILLIGRAM(S): 20 TABLET, DELAYED RELEASE ORAL at 05:30

## 2020-01-01 RX ADMIN — Medication 3 MILLILITER(S): at 20:34

## 2020-01-01 RX ADMIN — Medication 25 MILLIGRAM(S): at 21:02

## 2020-01-01 RX ADMIN — LISINOPRIL 10 MILLIGRAM(S): 2.5 TABLET ORAL at 08:35

## 2020-01-01 RX ADMIN — Medication 5 MILLIGRAM(S): at 06:17

## 2020-01-01 RX ADMIN — BUDESONIDE AND FORMOTEROL FUMARATE DIHYDRATE 2 PUFF(S): 160; 4.5 AEROSOL RESPIRATORY (INHALATION) at 10:06

## 2020-01-01 RX ADMIN — TIOTROPIUM BROMIDE 1 CAPSULE(S): 18 CAPSULE ORAL; RESPIRATORY (INHALATION) at 08:05

## 2020-01-01 RX ADMIN — Medication 5 MILLIGRAM(S): at 17:40

## 2020-01-01 RX ADMIN — Medication 25 MILLIGRAM(S): at 07:34

## 2020-01-01 RX ADMIN — Medication 110 MILLIGRAM(S): at 05:30

## 2020-01-01 RX ADMIN — ZOLPIDEM TARTRATE 5 MILLIGRAM(S): 10 TABLET ORAL at 23:08

## 2020-01-01 RX ADMIN — Medication 25 MILLIGRAM(S): at 13:41

## 2020-01-01 RX ADMIN — Medication 81 MILLIGRAM(S): at 12:26

## 2020-01-01 RX ADMIN — Medication 40 MILLIGRAM(S): at 05:42

## 2020-01-01 RX ADMIN — Medication 650 MILLIGRAM(S): at 00:31

## 2020-01-01 RX ADMIN — Medication 25 MILLIGRAM(S): at 22:05

## 2020-01-01 RX ADMIN — Medication 25 MILLIGRAM(S): at 05:34

## 2020-01-01 RX ADMIN — TIOTROPIUM BROMIDE 1 CAPSULE(S): 18 CAPSULE ORAL; RESPIRATORY (INHALATION) at 08:58

## 2020-01-01 RX ADMIN — Medication 650 MILLIGRAM(S): at 11:50

## 2020-01-01 RX ADMIN — Medication 0.25 MILLIGRAM(S): at 08:35

## 2020-01-01 RX ADMIN — BUDESONIDE AND FORMOTEROL FUMARATE DIHYDRATE 2 PUFF(S): 160; 4.5 AEROSOL RESPIRATORY (INHALATION) at 08:05

## 2020-01-01 RX ADMIN — Medication 81 MILLIGRAM(S): at 11:20

## 2020-01-01 RX ADMIN — Medication 40 MILLIGRAM(S): at 06:10

## 2020-01-01 RX ADMIN — Medication 100 MILLIGRAM(S): at 17:10

## 2020-01-01 RX ADMIN — PANTOPRAZOLE SODIUM 40 MILLIGRAM(S): 20 TABLET, DELAYED RELEASE ORAL at 06:22

## 2020-01-01 RX ADMIN — Medication 25 MILLIGRAM(S): at 17:07

## 2020-01-01 RX ADMIN — Medication 81 MILLIGRAM(S): at 12:24

## 2020-01-01 RX ADMIN — Medication 40 MILLIGRAM(S): at 05:38

## 2020-01-01 RX ADMIN — Medication 25 MILLIGRAM(S): at 22:02

## 2020-01-01 RX ADMIN — Medication 81 MILLIGRAM(S): at 11:35

## 2020-01-01 RX ADMIN — Medication 120 MILLIGRAM(S): at 11:35

## 2020-01-01 RX ADMIN — PANTOPRAZOLE SODIUM 40 MILLIGRAM(S): 20 TABLET, DELAYED RELEASE ORAL at 05:43

## 2020-01-01 RX ADMIN — Medication 40 MILLIGRAM(S): at 18:28

## 2020-01-01 RX ADMIN — Medication 25 MILLIGRAM(S): at 05:38

## 2020-01-01 RX ADMIN — Medication 25 MILLIGRAM(S): at 13:33

## 2020-01-01 RX ADMIN — Medication 3 MILLILITER(S): at 15:12

## 2020-01-01 RX ADMIN — ATORVASTATIN CALCIUM 10 MILLIGRAM(S): 80 TABLET, FILM COATED ORAL at 21:14

## 2020-01-01 RX ADMIN — Medication 3 MILLILITER(S): at 15:13

## 2020-01-01 RX ADMIN — ZOLPIDEM TARTRATE 5 MILLIGRAM(S): 10 TABLET ORAL at 21:55

## 2020-01-01 RX ADMIN — WARFARIN SODIUM 5 MILLIGRAM(S): 2.5 TABLET ORAL at 21:47

## 2020-01-01 RX ADMIN — Medication 25 MILLIGRAM(S): at 05:30

## 2020-01-01 RX ADMIN — Medication 25 MILLIGRAM(S): at 21:48

## 2020-01-01 RX ADMIN — Medication 25 MILLIGRAM(S): at 21:47

## 2020-01-01 RX ADMIN — Medication 81 MILLIGRAM(S): at 12:19

## 2020-01-01 RX ADMIN — Medication 25 MILLIGRAM(S): at 18:49

## 2020-01-01 RX ADMIN — Medication 3 MILLILITER(S): at 03:07

## 2020-01-01 RX ADMIN — ATORVASTATIN CALCIUM 10 MILLIGRAM(S): 80 TABLET, FILM COATED ORAL at 21:02

## 2020-01-01 RX ADMIN — ATORVASTATIN CALCIUM 10 MILLIGRAM(S): 80 TABLET, FILM COATED ORAL at 22:02

## 2020-01-01 RX ADMIN — BUDESONIDE AND FORMOTEROL FUMARATE DIHYDRATE 2 PUFF(S): 160; 4.5 AEROSOL RESPIRATORY (INHALATION) at 11:44

## 2020-01-01 RX ADMIN — Medication 5 MILLIGRAM(S): at 17:26

## 2020-01-01 RX ADMIN — Medication 100 MILLIGRAM(S): at 06:11

## 2020-01-01 RX ADMIN — Medication 1000 MILLILITER(S): at 08:35

## 2020-01-01 RX ADMIN — ATORVASTATIN CALCIUM 10 MILLIGRAM(S): 80 TABLET, FILM COATED ORAL at 21:47

## 2020-01-01 RX ADMIN — ATORVASTATIN CALCIUM 10 MILLIGRAM(S): 80 TABLET, FILM COATED ORAL at 22:37

## 2020-01-01 RX ADMIN — WARFARIN SODIUM 5 MILLIGRAM(S): 2.5 TABLET ORAL at 21:48

## 2020-01-01 RX ADMIN — Medication 5 MILLIGRAM(S): at 17:07

## 2020-01-01 RX ADMIN — Medication 110 MILLIGRAM(S): at 18:30

## 2020-01-01 RX ADMIN — ATORVASTATIN CALCIUM 10 MILLIGRAM(S): 80 TABLET, FILM COATED ORAL at 22:05

## 2020-01-01 RX ADMIN — Medication 25 MILLIGRAM(S): at 05:43

## 2020-01-01 RX ADMIN — Medication 240 MILLIGRAM(S): at 05:34

## 2020-01-01 RX ADMIN — WARFARIN SODIUM 5 MILLIGRAM(S): 2.5 TABLET ORAL at 21:18

## 2020-01-01 RX ADMIN — Medication 20 MILLIGRAM(S): at 12:26

## 2020-01-01 RX ADMIN — BUDESONIDE AND FORMOTEROL FUMARATE DIHYDRATE 2 PUFF(S): 160; 4.5 AEROSOL RESPIRATORY (INHALATION) at 11:40

## 2020-01-01 RX ADMIN — Medication 5 MILLIGRAM(S): at 09:15

## 2020-01-01 RX ADMIN — ZOLPIDEM TARTRATE 5 MILLIGRAM(S): 10 TABLET ORAL at 22:39

## 2020-01-01 RX ADMIN — Medication 25 MILLIGRAM(S): at 21:14

## 2020-01-01 RX ADMIN — Medication 650 MILLIGRAM(S): at 15:00

## 2020-01-01 RX ADMIN — Medication 3 MILLILITER(S): at 14:22

## 2020-01-01 RX ADMIN — Medication 25 MILLIGRAM(S): at 06:22

## 2020-01-01 RX ADMIN — INSULIN HUMAN 10 UNIT(S): 100 INJECTION, SOLUTION SUBCUTANEOUS at 08:39

## 2020-01-01 RX ADMIN — BUDESONIDE AND FORMOTEROL FUMARATE DIHYDRATE 2 PUFF(S): 160; 4.5 AEROSOL RESPIRATORY (INHALATION) at 09:04

## 2020-01-01 RX ADMIN — ZOLPIDEM TARTRATE 5 MILLIGRAM(S): 10 TABLET ORAL at 22:08

## 2020-01-01 RX ADMIN — Medication 5 MILLIGRAM(S): at 05:44

## 2020-01-01 RX ADMIN — Medication 20 MILLIGRAM(S): at 05:34

## 2020-01-01 RX ADMIN — Medication 40 MILLIGRAM(S): at 05:30

## 2020-01-01 RX ADMIN — ZOLPIDEM TARTRATE 5 MILLIGRAM(S): 10 TABLET ORAL at 21:16

## 2020-01-01 RX ADMIN — Medication 25 MILLIGRAM(S): at 21:57

## 2020-01-01 RX ADMIN — Medication 5 MILLIGRAM(S): at 06:22

## 2020-01-01 RX ADMIN — Medication 120 MILLIGRAM(S): at 18:01

## 2020-01-01 RX ADMIN — Medication 3 MILLILITER(S): at 01:15

## 2020-01-01 RX ADMIN — Medication 3 MILLILITER(S): at 20:44

## 2020-01-01 RX ADMIN — WARFARIN SODIUM 5 MILLIGRAM(S): 2.5 TABLET ORAL at 21:02

## 2020-01-01 RX ADMIN — Medication 100 MILLIGRAM(S): at 05:44

## 2020-01-01 RX ADMIN — Medication 3 MILLILITER(S): at 08:33

## 2020-01-01 RX ADMIN — Medication 110 MILLIGRAM(S): at 05:45

## 2020-01-01 RX ADMIN — Medication 10 MILLIGRAM(S): at 17:06

## 2020-01-01 RX ADMIN — ALBUTEROL 2 PUFF(S): 90 AEROSOL, METERED ORAL at 20:34

## 2020-01-01 RX ADMIN — Medication 5 MILLIGRAM(S): at 05:34

## 2020-01-01 RX ADMIN — TIOTROPIUM BROMIDE 1 CAPSULE(S): 18 CAPSULE ORAL; RESPIRATORY (INHALATION) at 07:56

## 2020-01-01 RX ADMIN — Medication 250 MILLIGRAM(S): at 09:10

## 2020-01-01 RX ADMIN — Medication 650 MILLIGRAM(S): at 05:37

## 2020-01-01 RX ADMIN — ATORVASTATIN CALCIUM 10 MILLIGRAM(S): 80 TABLET, FILM COATED ORAL at 21:18

## 2020-01-01 RX ADMIN — BUDESONIDE AND FORMOTEROL FUMARATE DIHYDRATE 2 PUFF(S): 160; 4.5 AEROSOL RESPIRATORY (INHALATION) at 08:02

## 2020-01-01 RX ADMIN — Medication 81 MILLIGRAM(S): at 11:43

## 2020-01-01 RX ADMIN — Medication 81 MILLIGRAM(S): at 12:39

## 2020-01-01 RX ADMIN — WARFARIN SODIUM 5 MILLIGRAM(S): 2.5 TABLET ORAL at 20:35

## 2020-01-01 RX ADMIN — BUDESONIDE AND FORMOTEROL FUMARATE DIHYDRATE 2 PUFF(S): 160; 4.5 AEROSOL RESPIRATORY (INHALATION) at 21:02

## 2020-01-01 RX ADMIN — PANTOPRAZOLE SODIUM 40 MILLIGRAM(S): 20 TABLET, DELAYED RELEASE ORAL at 08:35

## 2020-01-01 RX ADMIN — PANTOPRAZOLE SODIUM 40 MILLIGRAM(S): 20 TABLET, DELAYED RELEASE ORAL at 06:06

## 2020-01-01 RX ADMIN — BUDESONIDE AND FORMOTEROL FUMARATE DIHYDRATE 2 PUFF(S): 160; 4.5 AEROSOL RESPIRATORY (INHALATION) at 20:12

## 2020-01-01 RX ADMIN — Medication 650 MILLIGRAM(S): at 21:18

## 2020-01-01 RX ADMIN — Medication 110 MILLIGRAM(S): at 16:11

## 2020-01-01 RX ADMIN — Medication 25 MILLIGRAM(S): at 15:11

## 2020-01-01 RX ADMIN — Medication 40 MILLIGRAM(S): at 05:45

## 2020-01-01 RX ADMIN — PANTOPRAZOLE SODIUM 40 MILLIGRAM(S): 20 TABLET, DELAYED RELEASE ORAL at 05:34

## 2020-01-01 RX ADMIN — Medication 40 MILLIGRAM(S): at 06:22

## 2020-01-01 RX ADMIN — Medication 650 MILLIGRAM(S): at 21:48

## 2020-01-01 RX ADMIN — Medication 10 MILLIGRAM(S): at 04:01

## 2020-01-01 RX ADMIN — Medication 104 MILLIGRAM(S): at 15:12

## 2020-01-01 RX ADMIN — WARFARIN SODIUM 2.5 MILLIGRAM(S): 2.5 TABLET ORAL at 21:19

## 2020-01-01 RX ADMIN — Medication 3 MILLILITER(S): at 15:37

## 2020-01-01 RX ADMIN — Medication 25 MILLIGRAM(S): at 15:53

## 2020-01-01 RX ADMIN — ATORVASTATIN CALCIUM 10 MILLIGRAM(S): 80 TABLET, FILM COATED ORAL at 21:48

## 2020-01-01 RX ADMIN — BUDESONIDE AND FORMOTEROL FUMARATE DIHYDRATE 2 PUFF(S): 160; 4.5 AEROSOL RESPIRATORY (INHALATION) at 22:04

## 2020-01-01 RX ADMIN — PANTOPRAZOLE SODIUM 40 MILLIGRAM(S): 20 TABLET, DELAYED RELEASE ORAL at 06:11

## 2020-01-01 RX ADMIN — Medication 650 MILLIGRAM(S): at 23:55

## 2020-01-01 RX ADMIN — Medication 25 MILLIGRAM(S): at 14:58

## 2020-01-01 RX ADMIN — Medication 25 MILLIGRAM(S): at 21:18

## 2020-01-01 RX ADMIN — Medication 12.5 GRAM(S): at 14:35

## 2020-01-01 RX ADMIN — Medication 3 MILLILITER(S): at 13:42

## 2020-01-01 RX ADMIN — BUDESONIDE AND FORMOTEROL FUMARATE DIHYDRATE 2 PUFF(S): 160; 4.5 AEROSOL RESPIRATORY (INHALATION) at 20:13

## 2020-01-01 RX ADMIN — Medication 5 MILLIGRAM(S): at 08:51

## 2020-01-01 RX ADMIN — PANTOPRAZOLE SODIUM 40 MILLIGRAM(S): 20 TABLET, DELAYED RELEASE ORAL at 06:01

## 2020-01-01 RX ADMIN — Medication 25 MILLIGRAM(S): at 13:44

## 2020-01-01 RX ADMIN — BUDESONIDE AND FORMOTEROL FUMARATE DIHYDRATE 2 PUFF(S): 160; 4.5 AEROSOL RESPIRATORY (INHALATION) at 20:00

## 2020-01-01 RX ADMIN — Medication 40 MILLIGRAM(S): at 17:30

## 2020-01-01 RX ADMIN — Medication 3 MILLILITER(S): at 08:35

## 2020-01-01 RX ADMIN — Medication 240 MILLIGRAM(S): at 06:16

## 2020-01-01 RX ADMIN — WARFARIN SODIUM 5 MILLIGRAM(S): 2.5 TABLET ORAL at 22:05

## 2020-01-01 RX ADMIN — PANTOPRAZOLE SODIUM 40 MILLIGRAM(S): 20 TABLET, DELAYED RELEASE ORAL at 06:16

## 2020-01-01 RX ADMIN — Medication 110 MILLIGRAM(S): at 06:05

## 2020-01-01 RX ADMIN — Medication 10 MILLIGRAM(S): at 19:08

## 2020-01-01 RX ADMIN — Medication 25 MILLIGRAM(S): at 13:59

## 2020-01-01 RX ADMIN — Medication 25 MILLIGRAM(S): at 17:26

## 2020-01-17 PROBLEM — Z79.01 LONG TERM (CURRENT) USE OF ANTICOAGULANTS: Status: ACTIVE | Noted: 2020-01-01

## 2020-07-24 NOTE — ED PROVIDER NOTE - PHYSICAL EXAMINATION
CONST: mild respiratory distress.   EYES: Sclera and conjunctiva clear.  NECK: Non-tender, no meningeal signs, supple,  CARD: Normal S1 S2; Normal rate and rhythm  RESP: + wheezing, decreased expiratory breath sounds, + tachypnea, speaking in full sentences, + accessory muscle use, mild distress.   GI: Soft, non-tender, non-distended.  MS: Normal ROM in all extremities. 1+ edema of lower extremities, no calf pain, radial pulses 2+ bilaterally  SKIN: Warm, dry, no acute rashes. Good turgor  NEURO: A&Ox3, No focal deficits. Strength 5/5 with no sensory deficits

## 2020-07-24 NOTE — H&P ADULT - ASSESSMENT
Patient is a 80 y.o female w/ hx of AFIB on dig/ Cardizem /warfarin, HTN, HLD, CAD, COPD, recently started on 2L supplemental  oxygen presents to the ED for evaluation of dyspnea and productive cough.     Dyspnea and Productive cough: likely COPD exacerbation.  -Doubt PE ( no     ),           Doubt Diastolic CHF Exacerbation   -Sepsis ruled out on admission, Recently started on 2L Supplemental oxygen sats   -Presented with one week hx of   -Never being Intubated?? Denies  Tobacco use  -Last Exacerbation   -Inspiratory wheezing on exam, Accessory muscle use  -Chest X-ray:                      VBG: Respiratory Alkalosis, compensated   -Desaturated  to ....................Improved on BIPAP   -Pulm recs. appreciated   -c/w Solumedrol, Inhaler and Bipap      Essential HTN  -BP At goal on Presentation, however became elevated likely from Anxiety caused by Dyspnea  -Better assessment when comfortable  -c/w     CAD + HLD  -ECHO 2019  -Stress test: unremarkable  -c/w     AFIB:  Presenting with Sinus Bradycardia and Subtherapeutic INR   -Takes Digoxin and Cardizem for rate control  -Dig Level in progress given bradycardia  -Continue AC with coumadin, will   -Takes      DVT PPX: On coumadin, but subtherapeutic, will   GI PPX:   Full CODE   Dispo: from Home  -Pending Clinical improvement and pulm eval Patient is a 80 y.o female w/ hx of AFIB on dig/ Cardizem /warfarin, HTN, HLD, CAD, COPD, recently started on 2L supplemental  oxygen presents to the ED for evaluation of dyspnea and productive cough.     Dyspnea and Productive cough: likely COPD exacerbation.  -Doubt PE ( no Tachycardia, on Coumadin) ,  Doubt Diastolic CHF Exacerbation: given no crackles or Edema, no orthopnea   -Sepsis ruled out on admission, Recently started on 2L Supplemental oxygen and Trelegy Ellipta   -Presented with worsening SOB and productive cough   -Never being Intubated, Former smoker PPD for 65yrs, Last Exacerbation was sept 2019  -Inspiratory wheezing on exam, No Accessory muscle use, no crackles  -Chest X-ray: B/L opacities and Effusion VBG: Respiratory Alkalosis, compensated   -Desaturated  to 91% on RA from 97% and was showing signs of Distress, Symptoms improved on BIPAP   -Pulm recs. appreciated:  c/w Solumedrol, Inhalers, Duoneb  and Bipap 4hrs on// 4hrs off     AFIB:  Presenting with Sinus Bradycardia and Subtherapeutic INR   -Takes Digoxin and Cardizem for rate control( Dig Level is therapeutic)  -Continue AC with coumadin, takes 5mg daily   -will add therapeutic Lovenox given subtherapeutic INR  -c/w Dig, Cardizem and warfarin     Bradycardia???       Essential HTN  -BP At goal on Presentation, however became elevated likely from Anxiety caused by Dyspnea  -Better assessment when comfortable  -c/w Enalapril and Cardizem XL, will add hydralazine 25mg     CAD + HLD  -ECHO 2019:M   -Stress test: unremarkable  -c/w       DVT PPX: On coumadin, but subtherapeutic, will   GI PPX: on PPI   Full CODE   Dispo: from Home  -Pending Clinical improvement and pulm eval Patient is a 80 y.o female w/ hx of AFIB on dig/ Cardizem /warfarin, HTN, HLD, CAD, COPD, recently started on 2L supplemental  oxygen presents to the ED for evaluation of dyspnea and productive cough.     Dyspnea and Productive cough: likely COPD exacerbation.  -Doubt PE: no Tachycardia, on Coumadin,   -Doubt Diastolic CHF Exacerbation: given no crackles or Edema, no orthopnea   -Sepsis ruled out on admission, Recently started on 2L Supplemental oxygen and Trelegy Ellipta   -Presented with worsening SOB and productive cough, States she is compliant with medication  -Never being Intubated, Former smoker PPD for 65yrs, Last Exacerbation was sept 2019  -Desaturated  to 91% on RA from 97% and was showing signs of Distress, Symptoms improved on BIPAP  -Inspiratory wheezing on exam, no accessory muscle use, speaking in full sentence but mildly laboured   -Chest X-ray: B/L opacities and Effusion VBG: Respiratory Alkalosis, compensated   -Pulm recs. appreciated:  c/w Solumedrol, Inhalers, Duoneb  and Bipap 4hrs on// 4hrs off   -c/w Doxycycline to complete 5   -F/up AM Chest X-ray and BNP levels    AFIB:  Presenting with Sinus Bradycardia and Subtherapeutic INR   -Takes Digoxin and Cardizem for rate control( Dig Level is therapeutic)  -Continue AC with coumadin, takes 5mg daily   -will add therapeutic Lovenox given subtherapeutic INR  -c/w Dig, Cardizem and warfarin     Asymptomatic Sinus Bradycardia  -Check for Chronotropic response when ambulatory       Essential HTN  -BP At goal on Presentation, however became elevated likely from Anxiety caused by Dyspnea  -Better assessment when comfortable  -c/w Enalapril and Cardizem XL  -hydralazine 25mg Added for persistently elevated BP     CAD + HLD  -ECHO 2019: EF: 50-55%, Mild- mod mitral valve regurgitation  -Stress test from 2019: unremarkable  -c/w ASA and Statin       DVT PPX: On coumadin, but subtherapeutic.   -Will add Therapeutic Lovenox until INR is therapeutic  GI PPX: on PPI   Full CODE   Dispo: from Home  -Pending Clinical improvement and pulm eval Patient is a 80 y.o female w/ hx of AFIB on dig/ Cardizem /warfarin, HTN, HLD, CAD, COPD, recently started on 2L supplemental  oxygen presents to the ED for evaluation of dyspnea and productive cough.     Dyspnea and Productive cough: likely COPD exacerbation.  -Doubt PE: no Tachycardia, on Coumadin,   -Doubt Diastolic CHF Exacerbation: given no crackles or Edema, no orthopnea   -Sepsis ruled out on admission, Recently started on 2L Supplemental oxygen and Trelegy Ellipta   -Presented with worsening SOB and productive cough, States she is compliant with medication  -Never being Intubated, Former smoker PPD for 65yrs, Last Exacerbation was sept 2019  -Desaturated  to 91% on RA from 97% and was showing signs of Distress, Symptoms improved on BIPAP  -Inspiratory wheezing on exam, no accessory muscle use, speaking in full sentence but mildly laboured   -Chest X-ray: B/L opacities and Effusion VBG: Respiratory Alkalosis, compensated   -Pulm recs. appreciated:  c/w Solumedrol, Inhalers, Duoneb  and Bipap 4hrs on// 4hrs off   -c/w Doxycycline to complete 5   -F/up AM Chest X-ray and BNP levels    AFIB:  Presenting with Sinus Bradycardia and Subtherapeutic INR   -Couldn't find EKG, will order for AM   -Takes Digoxin and Cardizem for rate control ( Dig Level is therapeutic)  -Continue AC with coumadin, takes 5mg daily   -will add therapeutic Lovenox given subtherapeutic INR  -Will hold Cardizem and dig for now and monitor HR    Asymptomatic Sinus Bradycardia  -Check for Chronotropic response when ambulatory   -Holding Cardizem and Digoxin  for now  -Restart when appropriate Takes Cardizem 240 ER daily and Dig 0.25mg daily      Essential HTN  -BP At goal on Presentation, however became elevated likely from Anxiety caused by Dyspnea  -Better assessment when comfortable, gave hydralazine IV for Persistently elevated BP  -c/w Enalapril and Nifedipine XL daily , holding Cardizem for bradycardia      CAD + HLD  -ECHO 2019: EF: 50-55%, Mild- mod mitral valve regurgitation  -Stress test from 2019: unremarkable  -c/w ASA and Statin       DVT PPX: On coumadin, but subtherapeutic.   -Will add Therapeutic Lovenox until INR is therapeutic  GI PPX: on PPI   Full CODE   Dispo: from Home  -Pending Clinical improvement and pulm eval

## 2020-07-24 NOTE — CONSULT NOTE ADULT - SUBJECTIVE AND OBJECTIVE BOX
Pulmonary Consult    HPI:    81 y/o F with PMHx of HTN, COPD on home O2 recently started on 2 L O2 by Dr. Guadarrama, Afib on coumadin, cardizem and digoxin presents for worsening SOB. She is on albuterol and trelegy for her COPD but says that none is helping. She has been having worsening SOB for the past 2 days, she barely can make few steps before she runs out of breath. No fever/chills, cough or chest pain. She has chronic cough of white phlegm that occurs once in a while and not on a daily basis and she is a past heavy smoker of at least 60 PY.  In ED, SaO2 98% on 3L O2 via NC, HR 42/min, EKG to be performed, all labs and imaging still to be done. COVID-19 swab was sent. She was placed in critical care area for Non invasive ventilation via BiPAP. She is comfortable and speaking full sentences.    PAST MEDICAL & SURGICAL HISTORY:  HTN (hypertension)  COPD without exacerbation  Afib  History of appendectomy  Kidney cysts        FAMILY HISTORY:  FH: HTN (hypertension)      SOCIAL HISTORY:    No smoking no drug or alcohol abuse .     Allergies    No Known Allergies    Intolerances              REVIEW OF SYSTEMS:  Constitutional: No fevers or chills or weight loss.   Eyes: No itching or discharge from the eyes  ENT:  No post nasal drip. No epistaxis. No throat pain. . No difficulty swallowing.   CV: No chest pain. No palpitations.  or dizziness.   Resp: SOB on mild exertion.  GI: No nausea. No vomiting. No diarrhea or abdominal pain   MSK: No joint pain or pain in any extremities  Integumentary: No skin lesions. No pedal edema.  Neurological: No gross motor weakness. No sensory changes.      OBJECTIVE:        PHYSICAL EXAM:  General: Awake, alert, oriented X 3. on BiPAP   HEENT: Atraumatic, normocephalic.   Neck: No JVD no lymphadenopathy   Respiratory: Inspiratory wheezing, no crackles. Kyphosis  Cardiovascular: S1 S2 normal. No murmurs, rubs or gallops.   Abdomen: Soft, non-tender, non-distended. No organomegaly.  Extremities: Warm to touch. Peripheral pulse palpable. No pedal edema.   Skin: telangiectasis on LE  Neurological: Motor and sensory examination equal and normal in all four extremities.  Psychiatry: Appropriate mood and affect.    HOSPITAL MEDICATIONS:  MEDICATIONS  (STANDING):  albuterol/ipratropium for Nebulization.. 3 milliLiter(s) Nebulizer every 20 minutes  methylPREDNISolone sodium succinate IVPB 125 milliGRAM(s) IV Intermittent Once    MEDICATIONS  (PRN):      LABS:

## 2020-07-24 NOTE — ED ADULT NURSE NOTE - NSIMPLEMENTINTERV_GEN_ALL_ED
Implemented All Fall with Harm Risk Interventions:  [place] to call system. Call bell, personal items and telephone within reach. Instruct patient to call for assistance. Room bathroom lighting operational. Non-slip footwear when patient is off stretcher. Physically safe environment: no spills, clutter or unnecessary equipment. Stretcher in lowest position, wheels locked, appropriate side rails in place. Provide visual cue, wrist band, yellow gown, etc. Monitor gait and stability. Monitor for mental status changes and reorient to person, place, and time. Review medications for side effects contributing to fall risk. Reinforce activity limits and safety measures with patient and family. Provide visual clues: red socks.

## 2020-07-24 NOTE — ED PROVIDER NOTE - CLINICAL SUMMARY MEDICAL DECISION MAKING FREE TEXT BOX
79 yo F presented to Ed for respiratory distress. Patient placed on bipap with improvement. EKG demonstrated bradycardia with scooped ST depressions which were unchanged. Pts xray also concerning for pneumonia and was started on antibiotics. Patient admitted for further management.

## 2020-07-24 NOTE — ED PROVIDER NOTE - ATTENDING CONTRIBUTION TO CARE
81 yo F with PMH of afib on dig/cardizem/warfarin, HTN, HLD, CAD, COPD recently placed on home O2 presents to ED for worsening SOB x1 week. Patient has dry cough. No fever, chills, CP, LE swelling.       Eyes: PERRL, no conjunctival injection  HENT:  Neck supple without meningismus   CV: RRR, Warm, well-perfused extremities  RESP: decreased breath sounds, + tachypnea   GI: soft, non-tender, non-distended  MSK: No gross deformities appreciated  Skin: Warm, dry. No rashes  Neuro: Alert, CNs II-XII grossly intact. Sensation and motor function of extremities grossly intact.  Psych: Appropriate mood and affect.    Will start patient on bipap, do labs, CXR

## 2020-07-24 NOTE — H&P ADULT - NSHPPHYSICALEXAM_GEN_ALL_CORE
General:    Heart:     Lungs:     Abdomen:     Musculoskeletal:     Neuro General: NAD, BIPAP in place, Speaking in full sentences     Heart: S1/S2 appreciated, irregularly irregular, no murmurs     Lungs: Inspiratory wheezing b/l, no crackles     Abdomen: Soft, NT, ND     Musculoskeletal: Atraumatic, No LE edema b/l    Neuro: AO x 3, no focal deficit

## 2020-07-24 NOTE — H&P ADULT - NSHPREVIEWOFSYSTEMS_GEN_ALL_CORE
CONSTITUTIONAL: No weakness, fevers or chills    RESPIRATORY:     CARDIOVASCULAR: No chest pain or palpitations    GASTROINTESTINAL: No abdominal  pain, no nausea, vomiting, No diarrhea or constipation    GENITOURINARY: No dysuria, frequency or hematuria    NEUROLOGICAL: No numbness or weakness CONSTITUTIONAL: No weakness, fevers or chills    RESPIRATORY: INSPIRATORY WHEEZING B/L, no crackles     CARDIOVASCULAR: No chest pain or palpitations    GASTROINTESTINAL: No abdominal  pain, no nausea, vomiting, No diarrhea or constipation    GENITOURINARY: No dysuria, frequency or hematuria    NEUROLOGICAL: No numbness or weakness

## 2020-07-24 NOTE — ED PROVIDER NOTE - OBJECTIVE STATEMENT
80 y.o female w/ hx of afib on dig/cardizem/warfarin, HTN, HLD, CAD, COPD presents to the ED for evaluation of dyspnea x 1 week.  Gradually worsening dyspnea, worse w/ exertion, improved w/ rest, minimal improvement w/ home meds, moderate severity, no associated pain. Saw pulm Guadarrama monday who started pt on 2L NC.  Per family spo2 mid 80s when not on supplemental oxygen.  Also admits to dry cough. no fever, chills, chest pain, edema of lower extremities, calf pain.  No further complaints.  states this feels similar to previous COPD exacerbations.

## 2020-07-24 NOTE — H&P ADULT - HISTORY OF PRESENT ILLNESS
Patient is a 80 y.o female w/ hx of AFIB on dig/cardizem/warfarin, HTN, HLD, CAD, COPD, recently started on 2L supplemental  oxygen presents to the ED for evaluation of dyspnea and productive cough.     Symptoms started Patient is a 80 y.o female w/ hx of AFIB on dig/cardizem/warfarin, HTN, HLD, CAD, COPD, recently started on 2L supplemental  oxygen presents to the ED for evaluation of dyspnea and productive cough. Pt admits to baseline dyspnea, but started to worsen last week. She went to see her pulmonologist Dr thacker this past Monday and was started on 2L supplemental Oxygen and Trilogy, and was advised to come to the hospital but she refused. Pt is presenting today because her SOB has bot improved despite the new addition to her regimen. She has also been complaining of productive cough with white phlegm for about a month. She is a previous smoker, 1PPD for 65yrs, quit one year ago, she lives alone at home and denies any sick contact and states she has been compliant with her inhalers    In ED: pt saturating on 97% on RA, placed on BIPAP, Asymptomatic bradycardia 40-50BPM  ABG: Compensated  respiratory  Alkalosis  Chest X-ray: B/L opacities and Effusions  EKG: Patient is a 80 y.o female w/ hx of AFIB on dig/cardizem/warfarin, HTN, HLD, CAD, COPD, recently started on 2L supplemental  oxygen presents to the ED for evaluation of dyspnea and productive cough. Pt admits to baseline dyspnea, but started to worsen last week. She went to see her pulmonologist Dr thacker this past Monday and was started on 2L supplemental Oxygen and Trelegy Ellipta, and was advised to come to the hospital but she refused. Pt is presenting today because her SOB has not improved despite the new additions to her regimen. She has also been complaining of productive cough with white phlegm for about a month. She is a previous smoker, 1PPD for 65yrs, quit one year ago, she lives alone at home and denies any sick contact and states she has been compliant with her inhalers    In ED: pt saturating on 97% on RA, placed on BIPAP, Asymptomatic bradycardia 40-50BPM  ABG: Compensated respiratory Alkalosis  Chest X-ray: B/L opacities and Effusions

## 2020-07-24 NOTE — H&P ADULT - NSHPLABSRESULTS_GEN_ALL_CORE
12.2   10.25 )-----------( 297      ( 24 Jul 2020 15:17 )             37.1     07-24    138  |  100  |  15  ----------------------------<  123<H>  4.8   |  24  |  0.9    Ca    9.3      24 Jul 2020 15:17  Mg     2.2     07-24    TPro  6.4  /  Alb  4.0  /  TBili  0.8  /  DBili  x   /  AST  20  /  ALT  19  /  AlkPhos  91  07-24          PT/INR - ( 24 Jul 2020 15:23 )   PT: 20.40 sec;   INR: 1.77 ratio         PTT - ( 24 Jul 2020 15:23 )  PTT:34.8 sec  Lactate Trend    CARDIAC MARKERS ( 24 Jul 2020 15:17 )  x     / 0.01 ng/mL / x     / x     / x          CAPILLARY BLOOD GLUCOSE    RADIOLOGY:    < from: Xray Chest 1 View AP/PA (07.24.20 @ 15:34) >    Impression:      Basilar opacifications and effusions with cardiomegaly.    Worsened.    EKGS:

## 2020-07-24 NOTE — CONSULT NOTE ADULT - ASSESSMENT
IMPRESSION:  Hypoxemia - Probable acute on chronic respiratory failure ( VBG still needs to be done)  COPD on home O2  Afib on coumadin - bradycardia now    PLAN:  HOB at 45 degrees  F/up COVID19 PCR  1 dose of solumedrol 125 mg IV  BIPAP 4h ON , 4h OFF and at night for now  Duoneb q6h for SOB when off BIPAP  Symbicort q12h  F/up VBG  F/up CXR  Low threshold for ICU upgrade  Telemetry  Recommend cardiology evaluation for bradycardia if needed

## 2020-07-24 NOTE — H&P ADULT - ATTENDING COMMENTS
I saw and evaluated the patient. I have reviewed and agree with the findings and plan of care as documented above in the resident’s note (unless indicated differently below). Any necessary changes were made in the body of the text.    79 yo F pt w/ a relevant hx of COPD (recently started on 2 L/min O2 via NC) p/w "I cannot catch my breath and have persistent coughing and phlegm." Onset: x 1 week. Course: persistent and progressively worsening. Quality: more phlegm (whitish to yellowish), w/ increased coughing and SOB w/ minimal exertion. Intensity of symptoms: moderate to severe. Precipitating factors: cannot think of any (denies recent travel history or sick contacts). Alleviating factors: none. Aggravating factors: exertion. Last hospitalized in 2019. Says that she was able to walk up to a block without needing to rest until a few months ago. Now only able to walk a few feet before getting dyspneic. Extensive smoking hx; quit one year ago. Reports compliance w/ treatments and f/u visits.    ROS:  Constitutional: no fevers; no chills  Eyes: no conjunctivitis; no itching  ENT: no dysphagia; no odynophagia  CVS: no PND; +orthopnea; no chest pain  Resp: +SOB; +coughing; +sputum production  GI: no nausea; no vomiting; no diarrhea; no abd pain  : no dysuria; no hematuria  MSK: no myalgias; no arthralgias  Skin: no rashes; no ulcers  Neuro: no focal weakness; no headache  All other systems reviewed and are negative    PMHx, home medications, SurHx, FHx and Social history as above in the corresponding sections of the note - reviewed and edited where appropriate    Exam:  Vitals: BP = 169/72; P = 70; T = 97.8; RR = 20; SpO2 98 on 3 L/min via NC  General: appears stated age; cooperative  Eyes: anicteric sclera; moist conjunctiva; PERRL  HENT: NC/AT; clear oropharynx; nL hard/soft palate  Neck: supple w/ FROM; trachea midline; no thyromegaly; no carotid bruits  Lungs: mild tachypnea; no accessory muscle usage; no wheezing; no rhonci; decreased breath sounds b/l; crackles bibasilar more prominent on left  CVS: RRR; S1 and S2 w/o MRGs  Abd: BS+; soft; non-tender to palpation x 4; no masses or HSM  Ext: no peripheral edema; pulses 2+ b/l  Skin: +seborrheic keratoses; otherwise normal temp, turgor and texture w/ no rashes, ulcers or nodules  Neuro: CN II-XII intact; str and sensation grossly intact  Psych: appropriate affect; alert and oriented to person, place, time and situation    Labs significant for INR 1.77, gluc 123, proBNP 1,125, pH 7.45, pCO2 42, dig level 1.5  CXR: basilar opacifications and effusions w/ cardiomegaly  EKG: sinus bradycardia; old non-specific ST-T wave changes  ECHO (2019): LVEF of 50 to 55% w/ mild to moderate MV regurg & thickening of MV leaflets    Assessment:  (1) COPD exacerbation possibly 2/2 CAP  --- Chronic hypoxic respiratory failure: recently started on home O2  (2) Paroxysmal A-fib - in sinus laura at the time of admission:  --- [id number] 4 on warfarin w/ subtherapeutic INR  (3) HTN - uncontrolled  (4) Dyslipidemia - on tx    Plan:  (1) Supplemental O2 at 3 L/min; NIPPV support PRN and qHS  (2) Systemic steroids: titrate down as clinical condition improves  (3) KATHRYN/JULITO q6hrly  --- At home, pt is on LAMA  --- As such, would use tiotropium instead of ipratropium [will defer to Pulm recs]  (4) LABA/ICS as dosed  (5) Abx x 5 days as dosed  (6) Can continue warfarin - adjust dose as clinically appropriate based on daily PT/INR  --- For now, no need to bridge  (7) Telemetry monitoring given bradycardia on presentation:  --- Continue dig - therapeutic level  --- Can re-introduce diltiazem 180 controlled release in AM if HR remains stable  --- As per out-pt med review, pt was on dilt 360 until recently when script was sent for 180  --- Can pursue out-pt records, if possible, to determine why this was changed  (8) HTN: uncertain why pt was on both nifedipine and diltiazem  --- D/c nifedipine  --- Start lisinopril 10 mg daily (titrate up as appropriate to control BP)  (9) Continue statin  (10) Rest of medications as dosed  (11) Supportive care: keep head of bed elevated > 35 degrees  (12) Tentative d/c plannin-72 hrs - pending Clinical improvement, pulm f/u & recs    Code status: full code I saw and evaluated the patient. I have reviewed and agree with the findings and plan of care as documented above in the resident’s note (unless indicated differently below). Any necessary changes were made in the body of the text.    79 yo F pt w/ a relevant hx of COPD (recently started on 2 L/min O2 via NC) p/w "I cannot catch my breath and have persistent coughing and phlegm." Onset: x 1 week. Course: persistent and progressively worsening. Quality: more phlegm (whitish to yellowish), w/ increased coughing and SOB w/ minimal exertion. Intensity of symptoms: moderate to severe. Precipitating factors: cannot think of any (denies recent travel history or sick contacts). Alleviating factors: none. Aggravating factors: exertion. Last hospitalized in 2019. Says that she was able to walk up to a block without needing to rest until a few months ago. Now only able to walk a few feet before getting dyspneic. Extensive smoking hx; quit one year ago. Reports compliance w/ treatments and f/u visits.    ROS:  Constitutional: no fevers; no chills  Eyes: no conjunctivitis; no itching  ENT: no dysphagia; no odynophagia  CVS: no PND; +orthopnea; no chest pain  Resp: +SOB; +coughing; +sputum production  GI: no nausea; no vomiting; no diarrhea; no abd pain  : no dysuria; no hematuria  MSK: no myalgias; no arthralgias  Skin: no rashes; no ulcers  Neuro: no focal weakness; no headache  All other systems reviewed and are negative    PMHx, home medications, SurHx, FHx and Social history as above in the corresponding sections of the note - reviewed and edited where appropriate    Exam:  Vitals: BP = 169/72; P = 70; T = 97.8; RR = 20; SpO2 98 on 3 L/min via NC  General: appears stated age; cooperative  Eyes: anicteric sclera; moist conjunctiva; PERRL  HENT: NC/AT; clear oropharynx; nL hard/soft palate  Neck: supple w/ FROM; trachea midline; no thyromegaly; no carotid bruits  Lungs: mild tachypnea; no accessory muscle usage; no wheezing; no rhonci; decreased breath sounds b/l; crackles bibasilar more prominent on left  CVS: RRR; S1 and S2 w/o MRGs  Abd: BS+; soft; non-tender to palpation x 4; no masses or HSM  Ext: no peripheral edema; pulses 2+ b/l  Skin: +seborrheic keratoses; otherwise normal temp, turgor and texture w/ no rashes, ulcers or nodules  Neuro: CN II-XII intact; str and sensation grossly intact  Psych: appropriate affect; alert and oriented to person, place, time and situation    Labs significant for INR 1.77, gluc 123, proBNP 1,125, pH 7.45, pCO2 42, dig level 1.5  CXR: basilar opacifications and effusions w/ cardiomegaly  EKG: sinus bradycardia; old non-specific ST-T wave changes  ECHO (2019): LVEF of 50 to 55% w/ mild to moderate MV regurg & thickening of MV leaflets    Assessment:  (1) COPD exacerbation possibly 2/2 CAP  --- Chronic hypoxic respiratory failure: recently started on home O2  (2) Paroxysmal A-fib - in sinus laura at the time of admission:  --- [id number] 4 on warfarin w/ subtherapeutic INR  (3) HTN - uncontrolled  (4) Dyslipidemia - on tx    Plan:  (1) Supplemental O2 at 3 L/min; NIPPV support PRN and qHS  (2) Systemic steroids: titrate down as clinical condition improves  (3) KATHRYN/JULITO q6hrly; KATHRYN q2hrly PRN  --- At home, pt is on LAMA  --- As such, would use tiotropium instead of ipratropium [will defer to Pulm recs]  (4) LABA/ICS as dosed  (5) Abx x 5 days as dosed  (6) Can continue warfarin - adjust dose as clinically appropriate based on daily PT/INR  --- For now, no need to bridge  (7) Telemetry monitoring given bradycardia on presentation:  --- Continue dig - therapeutic level  --- Can re-introduce diltiazem 180 controlled release in AM if HR remains stable  --- As per out-pt med review, pt was on dilt 360 until recently when script was sent for 180  --- Can pursue out-pt records, if possible, to determine why this was changed  (8) HTN: uncertain why pt was on both nifedipine and diltiazem  --- D/c nifedipine  --- Start lisinopril 10 mg daily (titrate up as appropriate to control BP)  (9) Continue statin  (10) Rest of medications as dosed  (11) Supportive care: keep head of bed elevated > 35 degrees  (12) Tentative d/c plannin-72 hrs - pending Clinical improvement, pulm f/u & recs    Code status: full code

## 2020-07-24 NOTE — ED PROVIDER NOTE - NS ED ROS FT
Constitutional: See HPI.  Eyes: No visual changes, eye pain or discharge.   ENMT: No hearing changes, pain, discharge or infections.   Cardiac: . No chest pain with exertion.  Respiratory: + cough. mild respiratory distress. No hemoptysis.   GI: No nausea, vomiting, diarrhea or abdominal pain.  : No dysuria, frequency or burning. No Discharge  MS: No myalgia, muscle weakness, joint pain or back pain.  Neuro: No headache or weakness.   Skin: No skin rash.  Except as documented in the HPI, all other systems are negative.

## 2020-07-25 NOTE — ED ADULT NURSE REASSESSMENT NOTE - NS ED NURSE REASSESS COMMENT FT1
SHASHANK MCKEON made aware pt bradycardic. Pt asymptomatic. Pt on Cardiac/O2 monitor. EKG to be ordered. Awaiting further orders.
upon assessment of administering duonebs, pt appeared to be tachypneic, increased work of breathing. placed back on BIPAP.
patient a/ox3 at bedside on cardiac monitor and bipap. patient was attempted to be weaned off bipap and on 3L nc however patient wasn't able to tolerate NC. bipap was placed back on.     MD made aware of patient vitals ( see flow sheet), awaiting iv blood pressure medication by MD. patient takes warfarin however is being delayed at this time due to bipap will attempt to wean patient off bipap again. MD on call made is aware of plan.  oncoming nurse will be made aware. will continue to assess and monitor

## 2020-07-25 NOTE — PROGRESS NOTE ADULT - SUBJECTIVE AND OBJECTIVE BOX
SUBJECTIVE:    Patient is a 80y old Female who presents with a chief complaint of COPD exacerbation (24 Jul 2020 16:46)    Currently admitted to medicine with the primary diagnosis of COPD exacerbation     Today is hospital day 1d. This morning she is resting comfortably in bed and reports no new issues or overnight events.     PAST MEDICAL & SURGICAL HISTORY  HTN (hypertension)  COPD without exacerbation  Afib  History of appendectomy  Kidney cysts    SOCIAL HISTORY:  Negative for smoking/alcohol/drug use.     ALLERGIES:  No Known Allergies    MEDICATIONS:  STANDING MEDICATIONS  albuterol/ipratropium for Nebulization 3 milliLiter(s) Nebulizer every 6 hours  aspirin  chewable 81 milliGRAM(s) Oral daily  atorvastatin 10 milliGRAM(s) Oral at bedtime  budesonide 160 MICROgram(s)/formoterol 4.5 MICROgram(s) Inhaler 2 Puff(s) Inhalation two times a day  dextrose 10%. 250 milliLiter(s) IV Continuous <Continuous>  doxycycline IVPB 100 milliGRAM(s) IV Intermittent every 12 hours  lisinopril 10 milliGRAM(s) Oral daily  methylPREDNISolone sodium succinate Injectable 40 milliGRAM(s) IV Push every 12 hours  pantoprazole    Tablet 40 milliGRAM(s) Oral before breakfast  warfarin 5 milliGRAM(s) Oral at bedtime    PRN MEDICATIONS  ALBUTerol    90 MICROgram(s) HFA Inhaler 2 Puff(s) Inhalation every 2 hours PRN    VITALS:   T(F): 98.7  HR: 62  BP: 175/97  RR: 22  SpO2: 99%    LABS:                        12.6   8.73  )-----------( 325      ( 25 Jul 2020 05:55 )             39.2     07-25    139  |  100  |  16  ----------------------------<  146<H>  6.0<HH>   |  24  |  0.9    Ca    10.0      25 Jul 2020 05:55  Mg     2.2     07-24    TPro  6.4  /  Alb  4.0  /  TBili  0.8  /  DBili  x   /  AST  20  /  ALT  19  /  AlkPhos  91  07-24    PT/INR - ( 25 Jul 2020 05:55 )   PT: 22.10 sec;   INR: 1.92 ratio         PTT - ( 24 Jul 2020 15:23 )  PTT:34.8 sec      Troponin T, Serum: 0.01 ng/mL (07-24-20 @ 15:17)      CARDIAC MARKERS ( 24 Jul 2020 15:17 )  x     / 0.01 ng/mL / x     / x     / x          RADIOLOGY:  < from: Xray Chest 1 View AP/PA (07.24.20 @ 15:34) >  Impression:      Basilar opacifications and effusions with cardiomegaly.    Worsened.    < end of copied text >  < from: [facility] Duplex Lower Ext Vein Scan, Bilat (09.11.19 @ 14:52) >  Impression:    No evidence of deep venous thrombosis or superficial thrombophlebitis in   the bilateral lower extremities.    < end of copied text >    PHYSICAL EXAM:  General: appears stated age; cooperative  Eyes: anicteric sclera; moist conjunctiva; PERRL  HENT: NC/AT; clear oropharynx; nL hard/soft palate  Neck: supple w/ FROM; trachea midline; no thyromegaly; no carotid bruits  Lungs: mild tachypnea; no accessory muscle usage; no wheezing; no rhonci; decreased breath sounds b/l; crackles bibasilar more prominent on left  CVS: RRR; S1 and S2 w/o MRGs  Abd: BS+; soft; non-tender to palpation x 4; no masses or HSM  Ext: no peripheral edema; pulses 2+ b/l  Skin: +seborrheic keratoses; otherwise normal temp, turgor and texture w/ no rashes, ulcers or nodules  Neuro: CN II-XII intact; str and sensation grossly intact  Psych: appropriate affect; alert and oriented to person, place, time and situation

## 2020-07-25 NOTE — PROGRESS NOTE ADULT - ASSESSMENT
Patient is a 80 y.o female w/ hx of AFIB on dig/ Cardizem /warfarin, HTN, HLD, CAD, COPD, recently started on 2L supplemental  oxygen presents to the ED for evaluation of dyspnea and productive cough.     # Hypoxemia secondary to chronic COPD  - Presented with worsening SOB and productive cough, States she is compliant with medication  - Never being Intubated, Former smoker PPD for 65yrs, Last Exacerbation was sept 2019  - Desaturated  to 91% on RA from 97% and was showing signs of Distress, tachypneic Symptoms improved on BIPAP  -Chest X-ray: B/L opacities and Effusion VBG: Respiratory Alkalosis, compensated   -Pulm recs. appreciated:  c/w Solumedrol, Inhalers, Duoneb  and Bipap 4hrs on// 4hrs off   -c/w Doxycycline to complete 5   -F/up AM Chest X-ray and BNP levels    # AFIB:  Presenting with Sinus Bradycardia and Subtherapeutic INR   - Takes Digoxin and Cardizem for rate control ( Dig Level is therapeutic)  - Continue AC with coumadin, takes 5mg daily   - will hold digoxin given hyperkalemia and sinus bradycardia, c/w Cardizem 180 qd (home dose 240) for rate control, hold if laura.     # Suspected Digoxin toxicity - Asymptomatic Sinus Bradycardia  - hypekalema and sinus bradycardia  - digoxin level WNL 1.5, denies any GI, visual or neurological symptoms  - f/u BMP and digoxin levels    # Essential HTN  - c/w Enalapril and Nifedipine XL daily ,     # CAD + HLD  - ECHO 2019: EF: 50-55%, Mild- mod mitral valve regurgitation  - Stress test from 2019: unremarkable  - c/w ASA and Statin     # Diet: DASH/TLC  # DVT Prophylaxis: warfarin  # GI Prophylaxis: protonix  # Activity: IAT  # Dispo: from home  # Code Status: Full code

## 2020-07-25 NOTE — PATIENT PROFILE ADULT - NSPROGENSOURCEINFO_GEN_A_NUR
EXAM DATE/TIME:  10/25/2017 15:46 

 

[facility] COMPARISON:     

No previous studies available for comparison.

 

                     

INDICATIONS :     

Short of breath and cough.

                     

 

MEDICAL HISTORY :     

None.          

 

SURGICAL HISTORY :     

None.   

 

ENCOUNTER:     

Initial                                        

 

ACUITY:     

2 weeks      

 

PAIN SCORE:     

6/10

 

LOCATION:     

Bilateral chest 

 

FINDINGS:     

PA and lateral views of the chest demonstrate the lungs to be symmetrically aerated without evidence 
of mass, infiltrate or effusion.  The cardiomediastinal contours are unremarkable.  Mild S-shaped sco
liosis.  Hemoclips in the right upper quadrant..

 

CONCLUSION:     

No acute cardiopulmonary disease.

 

 

 

 Edmundo Nielsen MD on October 25, 2017 at 16:19           

Board Certified Radiologist.

 This report was verified electronically. patient

## 2020-07-26 NOTE — PROGRESS NOTE ADULT - ATTENDING COMMENTS
#copd exacerbation - cont solumderol, o2 prn,doxy, albuterol  #acute hypoxic resp failure - improving req bipap  # bradycardia - resolved - restart cardizem  #chronic afib - cont coumadin - inr monitor  #hyperkalemia - recheck bmp after tx - if still elevated can give lokelma   discussed with resident
Patient seen and examined independently. I agree with the resident's note, physical exam, and plan except as below.  Vital Signs Last 24 Hrs  T(C): 36.1 (26 Jul 2020 13:47), Max: 36.8 (25 Jul 2020 20:30)  T(F): 97 (26 Jul 2020 13:47), Max: 98.2 (25 Jul 2020 20:30)  HR: 64 (26 Jul 2020 13:47) (45 - 69)  BP: 173/74 (26 Jul 2020 13:47) (111/56 - 193/82)  BP(mean): --  RR: 18 (26 Jul 2020 13:47) (18 - 22)  SpO2: 96% (26 Jul 2020 03:30) (96% - 97%)  PE  nad  aaox3  [id number]  dec airentry bilat no wheeze  softntnd+_bs  no cce    MEDICATIONS  (STANDING):  albuterol/ipratropium for Nebulization 3 milliLiter(s) Nebulizer every 6 hours  aspirin  chewable 81 milliGRAM(s) Oral daily  atorvastatin 10 milliGRAM(s) Oral at bedtime  budesonide 160 MICROgram(s)/formoterol 4.5 MICROgram(s) Inhaler 2 Puff(s) Inhalation two times a day  doxycycline IVPB 100 milliGRAM(s) IV Intermittent every 12 hours  hydrALAZINE 25 milliGRAM(s) Oral three times a day  lisinopril 10 milliGRAM(s) Oral daily  methylPREDNISolone sodium succinate Injectable 40 milliGRAM(s) IV Push every 12 hours  pantoprazole    Tablet 40 milliGRAM(s) Oral before breakfast  warfarin 5 milliGRAM(s) Oral once    MEDICATIONS  (PRN):  ALBUTerol    90 MICROgram(s) HFA Inhaler 2 Puff(s) Inhalation every 2 hours PRN Shortness of Breath and/or Wheezing      #acute on chronic hypoxic resp fialure- home o2 recently started  #copd exacerbation - iv solumderol, nebs, doxy, o2 prn, laba/ics  #chronic afib - currently sinus laura - digoxin and cardizem held - on coumadin check inr  echo wnl  cardio eval Dr lopez - likely medication related -need foe eps eval?  #HTN - uncontrolled - can increase hydralizine if uncontrolled  #hyperkalemia - improved - if still elevated will need to hold lisinopril     discussed with pt, son, rn

## 2020-07-26 NOTE — CONSULT NOTE ADULT - ASSESSMENT
Patient is a 80 y.o female w/ hx of AFIB on dig/cardizem/warfarin, HTN, HLD, CAD, COPD, recently started on 2L supplemental  oxygen presents to the ED for evaluation of dyspnea and productive cough and admitted for copd exacerbation.      cardio (cardiologist = Dr Hairston) consulted for bradycardia (pt ranges 40-50 occasionally to 60-70s.   On cardizem 360, digoxin .25.        Bradycardia  - pt reports occasional lightheadedness, unsure if correlates to bradycardic episodes  - ekg:  appears to be junctional laura 7/24 ekg with unchanged inferolat st abn (compared to 2019)  - if still laura despite holding cardizem / dig --> may benefit from ep eval and/or stress testing for chronotrop competence.   - check tsh / t4    >> will d/w attending Patient is a 80 y.o female w/ hx of AFIB on dig/cardizem/warfarin, HTN, HLD, CAD, COPD, recently started on 2L supplemental  oxygen presents to the ED for evaluation of dyspnea and productive cough and admitted for copd exacerbation.      cardio (cardiologist = Dr Hairston) consulted for bradycardia (pt ranges 40-50 occasionally to 60-70s.   On cardizem 360, digoxin .25.        Bradycardia / AF  - pt reports occasional lightheadedness, unsure if correlates to bradycardic episodes  - ekg:  appears to be junctional laura 7/24 ekg with unchanged inferolat st abn (compared to 2019)  - check tsh / t4  - on tele,  pt now having tachy episodes now that her cardizem/dig are held  - restart cardizem @ 120mg for rate control Patient is a 80 y.o female w/ hx of AFIB on dig/cardizem/warfarin, HTN, HLD, CAD, COPD, recently started on 2L supplemental  oxygen presents to the ED for evaluation of dyspnea and productive cough and admitted for copd exacerbation.      cardio (cardiologist = Dr Hairston) consulted for bradycardia (pt ranges 40-50 occasionally to 60-70s.   On cardizem 360, digoxin .25.        Bradycardia / AF  - pt reports occasional lightheadedness, unsure if correlates to bradycardic episodes  - ekg:  appears to be junctional laura 7/24 ekg with unchanged inferolat st abn (compared to 2019)  - check tsh / t4  - on tele,  pt now having tachy episodes now that her cardizem/dig are held  - restart Cardizem @ 120 mg , hold dig  - up titrate ccb as tolerated for resting hr 60s

## 2020-07-26 NOTE — CONSULT NOTE ADULT - SUBJECTIVE AND OBJECTIVE BOX
HPI:  Patient is a 80 y.o female w/ hx of AFIB on dig/cardizem/warfarin, HTN, HLD, CAD, COPD, recently started on 2L supplemental  oxygen presents to the ED for evaluation of dyspnea and productive cough and admitted for copd exacerbation.      cardio consulted for bradycardia (pt ranges 40-50 occasionally to 60-70s.   On cardizem 360, digoxin .25.         PAST MEDICAL & SURGICAL HISTORY  HTN (hypertension)  COPD without exacerbation  Afib  History of appendectomy  Kidney cysts      FAMILY HISTORY:  FAMILY HISTORY:  FH: HTN (hypertension)      SOCIAL HISTORY:  []smoker  []Alcohol  []Drug    ALLERGIES:  No Known Allergies      MEDICATIONS:  MEDICATIONS  (STANDING):  albuterol/ipratropium for Nebulization 3 milliLiter(s) Nebulizer every 6 hours  aspirin  chewable 81 milliGRAM(s) Oral daily  atorvastatin 10 milliGRAM(s) Oral at bedtime  budesonide 160 MICROgram(s)/formoterol 4.5 MICROgram(s) Inhaler 2 Puff(s) Inhalation two times a day  doxycycline IVPB 100 milliGRAM(s) IV Intermittent every 12 hours  hydrALAZINE 25 milliGRAM(s) Oral three times a day  methylPREDNISolone sodium succinate Injectable 40 milliGRAM(s) IV Push every 12 hours  pantoprazole    Tablet 40 milliGRAM(s) Oral before breakfast    MEDICATIONS  (PRN):  ALBUTerol    90 MICROgram(s) HFA Inhaler 2 Puff(s) Inhalation every 2 hours PRN Shortness of Breath and/or Wheezing      HOME MEDICATIONS:  Home Medications:  aspirin 81 mg oral tablet: 1 tab(s) orally once a day (2019 13:01)  digoxin: 0.25  orally once a day (2019 13:01)  DilTIAZem (Eqv-Cardizem CD) 180 mg/24 hours oral capsule, extended release: 1 cap(s) orally once a day (2020 18:09)  pravastatin 40 mg oral tablet: 1 tab(s) orally once a day (2019 13:01)  Trelegy Ellipta inhalation powder: 1 puff(s) inhaled once a day (2020 18:11)  Vasotec 5 mg oral tablet: 1 tab(s) orally once a day (2019 13:01)  warfarin 5 mg oral tablet: 1 to 1.5 tab(s)  orally once a day as directed  (2019 13:01)      VITALS:   T(F): 96.1 ( @ 20:30), Max: 98.7 ( @ 08:24)  HR: 74 ( @ 20:30) (42 - 77)  BP: 157/68 ( @ 20:30) (111/56 - 195/77)  BP(mean): --  RR: 18 ( @ 20:30) (18 - 28)  SpO2: 100% ( @ 22:31) (91% - 100%)    I&O's Summary    2020 07:01  -  2020 00:00  --------------------------------------------------------  IN: 240 mL / OUT: 0 mL / NET: 240 mL        REVIEW OF SYSTEMS:  CONSTITUTIONAL: No weakness, fevers or chills  HEENT: No visual changes, neck/ear pain  RESPIRATORY: No cough, sob  CARDIOVASCULAR: No chest pain or palpitations  GASTROINTESTINAL: No abdominal pain. No nausea, vomiting, diarrhea   GENITOURINARY: No dysuria, frequency or hematuria  NEUROLOGICAL: No new focal deficits  SKIN: No new rashes    PHYSICAL EXAM:  General: Not in distress.  Non-toxic appearing.  on cpap currently  HEENT: EOMI  Cardio: Not laura at this time. Regular rate and rhythm, S1, S2, no murmur  Pulm: B/L BS.  No wheezing / crackles  Abdomen: Soft, non-tender, non-distended. Normoactive bowel sounds  Extremities: No edema b/l   Neuro: A&O x3. No focal deficits    LABS:                        11.7   12.26 )-----------( 328      ( 2020 05:24 )             36.4         135  |  98  |  26<H>  ----------------------------<  206<H>  5.1<H>   |  23  |  1.0    Ca    9.3      2020 05:24    TPro  6.2  /  Alb  3.9  /  TBili  0.4  /  DBili  x   /  AST  19  /  ALT  21  /  AlkPhos  89      PT/INR - ( 2020 05:24 )   PT: 29.60 sec;   INR: 2.57 ratio         PTT - ( 2020 05:24 )  PTT:33.9 sec          Troponin trend:    Serum Pro-Brain Natriuretic Peptide: 1462 pg/mL (20 @ 05:55)          RADIOLOGY:  -CXR:< from: Xray Chest 1 View- PORTABLE-Routine (20 @ 10:03) >  Improved but persistent bibasilar opacities/effusions.      -TTE: < from: Transthoracic Echocardiogram (20 @ 11:24) >   1. LV Ejection Fraction by Steve's Method with a biplane EF of 58 %.   2. Right atrial enlargement.   3. Mild to moderate mitral valve regurgitation.   4. Mitral annular calcification.   5. Thickening and calcification of the anterior and posterior mitral valve leaflets.   6. Mild-moderate tricuspid regurgitation.   7. Estimated pulmonary artery systolic pressure is 72.2 mmHg assuming a right atrial pressure of 15 mmHg, which is consistent with severe pulmonary hypertension.    < end of copied text >    -CCTA:  -STRESS TEST:  -CATHETERIZATION:    EC Lead ECG:   Ventricular Rate 67 BPM    Atrial Rate 67 BPM    P-R Interval 184 ms    QRS Duration 86 ms    Q-T Interval 392 ms    QTC Calculation(Bezet) 414 ms    P Axis 60 degrees    R Axis 83 degrees    T Axis 249 degrees    Diagnosis Line Normal sinus rhythm  Marked ST abnormality, possible inferior subendocardial injury  Abnormal ECG    Confirmed by Gerardo Owens ([id number]) on 2020 10:40:36 PM ( @ 10:55)      TELEMETRY EVENTS: HPI:  Patient is a 80 y.o female w/ hx of AFIB on dig/cardizem/warfarin, HTN, HLD, CAD, COPD, recently started on 2L supplemental  oxygen presents to the ED for evaluation of dyspnea and productive cough and admitted for copd exacerbation.      cardio consulted for bradycardia (pt ranges 40-50 occasionally to 60-70s.   On cardizem 360, digoxin .25.         PAST MEDICAL & SURGICAL HISTORY  HTN (hypertension)  COPD without exacerbation  Afib  History of appendectomy  Kidney cysts      FAMILY HISTORY:  FAMILY HISTORY:  FH: HTN (hypertension)      SOCIAL HISTORY:  []smoker  []Alcohol  []Drug    ALLERGIES:  No Known Allergies      MEDICATIONS:  MEDICATIONS  (STANDING):  albuterol/ipratropium for Nebulization 3 milliLiter(s) Nebulizer every 6 hours  aspirin  chewable 81 milliGRAM(s) Oral daily  atorvastatin 10 milliGRAM(s) Oral at bedtime  budesonide 160 MICROgram(s)/formoterol 4.5 MICROgram(s) Inhaler 2 Puff(s) Inhalation two times a day  doxycycline IVPB 100 milliGRAM(s) IV Intermittent every 12 hours  hydrALAZINE 25 milliGRAM(s) Oral three times a day  methylPREDNISolone sodium succinate Injectable 40 milliGRAM(s) IV Push every 12 hours  pantoprazole    Tablet 40 milliGRAM(s) Oral before breakfast    MEDICATIONS  (PRN):  ALBUTerol    90 MICROgram(s) HFA Inhaler 2 Puff(s) Inhalation every 2 hours PRN Shortness of Breath and/or Wheezing      HOME MEDICATIONS:  Home Medications:  aspirin 81 mg oral tablet: 1 tab(s) orally once a day (2019 13:01)  digoxin: 0.25  orally once a day (2019 13:01)  DilTIAZem (Eqv-Cardizem CD) 180 mg/24 hours oral capsule, extended release: 1 cap(s) orally once a day (2020 18:09)  pravastatin 40 mg oral tablet: 1 tab(s) orally once a day (2019 13:01)  Trelegy Ellipta inhalation powder: 1 puff(s) inhaled once a day (2020 18:11)  Vasotec 5 mg oral tablet: 1 tab(s) orally once a day (2019 13:01)  warfarin 5 mg oral tablet: 1 to 1.5 tab(s)  orally once a day as directed  (2019 13:01)      VITALS:   T(F): 96.1 ( @ 20:30), Max: 98.7 ( @ 08:24)  HR: 74 ( @ 20:30) (42 - 77)  BP: 157/68 ( @ 20:30) (111/56 - 195/77)  BP(mean): --  RR: 18 ( @ 20:30) (18 - 28)  SpO2: 100% ( @ 22:31) (91% - 100%)    I&O's Summary    2020 07:01  -  2020 00:00  --------------------------------------------------------  IN: 240 mL / OUT: 0 mL / NET: 240 mL        REVIEW OF SYSTEMS:  CONSTITUTIONAL: No weakness, fevers or chills  HEENT: No visual changes, neck/ear pain  RESPIRATORY: No cough, sob  CARDIOVASCULAR: No chest pain or palpitations  GASTROINTESTINAL: No abdominal pain. No nausea, vomiting, diarrhea   GENITOURINARY: No dysuria, frequency or hematuria  NEUROLOGICAL: No new focal deficits  SKIN: No new rashes    PHYSICAL EXAM:  General: Not in distress.  Non-toxic appearing.  on cpap currently  HEENT: EOMI  Cardio: Not laura at this time. Regular rate and rhythm, S1, S2, no murmur  Pulm: B/L BS.  No wheezing / crackles  Abdomen: Soft, non-tender, non-distended. Normoactive bowel sounds  Extremities: No edema b/l   Neuro: A&O x3. No focal deficits  Skin: intact    LABS:                        11.7   12.26 )-----------( 328      ( 2020 05:24 )             36.4         135  |  98  |  26<H>  ----------------------------<  206<H>  5.1<H>   |  23  |  1.0    Ca    9.3      2020 05:24    TPro  6.2  /  Alb  3.9  /  TBili  0.4  /  DBili  x   /  AST  19  /  ALT  21  /  AlkPhos  89      PT/INR - ( 2020 05:24 )   PT: 29.60 sec;   INR: 2.57 ratio         PTT - ( 2020 05:24 )  PTT:33.9 sec          Troponin trend:    Serum Pro-Brain Natriuretic Peptide: 1462 pg/mL (20 @ 05:55)          RADIOLOGY:  -CXR:< from: Xray Chest 1 View- PORTABLE-Routine (20 @ 10:03) >  Improved but persistent bibasilar opacities/effusions.      -TTE: < from: Transthoracic Echocardiogram (20 @ 11:24) >   1. LV Ejection Fraction by Steve's Method with a biplane EF of 58 %.   2. Right atrial enlargement.   3. Mild to moderate mitral valve regurgitation.   4. Mitral annular calcification.   5. Thickening and calcification of the anterior and posterior mitral valve leaflets.   6. Mild-moderate tricuspid regurgitation.   7. Estimated pulmonary artery systolic pressure is 72.2 mmHg assuming a right atrial pressure of 15 mmHg, which is consistent with severe pulmonary hypertension.    < end of copied text >    -CCTA:  -STRESS TEST:  -CATHETERIZATION:    EC Lead ECG:   Ventricular Rate 67 BPM    Atrial Rate 67 BPM    P-R Interval 184 ms    QRS Duration 86 ms    Q-T Interval 392 ms    QTC Calculation(Bezet) 414 ms    P Axis 60 degrees    R Axis 83 degrees    T Axis 249 degrees    Diagnosis Line Normal sinus rhythm  Marked ST abnormality, possible inferior subendocardial injury  Abnormal ECG    Confirmed by Gerardo Owens ([id number]) on 2020 10:40:36 PM ( @ 10:55)      TELEMETRY EVENTS:

## 2020-07-26 NOTE — PROGRESS NOTE ADULT - SUBJECTIVE AND OBJECTIVE BOX
Patient is a 80y old Female who presents with a chief complaint of COPD exacerbation (25 Jul 2020 09:59)    No acute events overnight. Patient has no complaints this morning. Denies chest pain, SOB, N/V/D,      Allergies  No Known Allergies      PAST MEDICAL & SURGICAL HISTORY:  HTN (hypertension)  COPD without exacerbation  Afib  History of appendectomy  Kidney cysts      PHYSICAL EXAM  Vital Signs Last 24 Hrs  T(C): 35.8 (26 Jul 2020 04:59), Max: 36.8 (25 Jul 2020 20:30)  T(F): 96.4 (26 Jul 2020 04:59), Max: 98.2 (25 Jul 2020 20:30)  HR: 45 (26 Jul 2020 05:30) (45 - 69)  BP: 131/60 (26 Jul 2020 05:30) (111/56 - 193/82)  BP(mean): --  RR: 18 (26 Jul 2020 04:59) (18 - 22)  SpO2: 96% (26 Jul 2020 03:30) (96% - 99%)    I&O's Summary    General: appears stated age; cooperative  Eyes: anicteric sclera; moist conjunctiva; PERRL  HENT: NC/AT; clear oropharynx; nL hard/soft palate  Lungs: mild tachypnea; no accessory muscle usage; no wheezing; no rhonci; decreased breath sounds b/l; crackles bibasilar more prominent on left  CVS: RRR; S1 and S2 w/o MRGs  Abd: BS+; soft; non-tender to palpation x 4; no masses or HSM  Ext: no peripheral edema; pulses 2+ b/l  Skin: +seborrheic keratoses; otherwise normal temp, turgor and texture w/ no rashes, ulcers or nodules  Neuro: CN II-XII intact; str and sensation grossly intact  Psych: appropriate affect; alert and oriented to person, place, time and situation      LABS                        11.7   12.26 )-----------( 328      ( 26 Jul 2020 05:24 )             36.4     Hemoglobin: 11.7 g/dL (07-26 @ 05:24)  Hemoglobin: 12.6 g/dL (07-25 @ 05:55)  Hemoglobin: 12.2 g/dL (07-24 @ 15:17)    CBC Full  -  ( 26 Jul 2020 05:24 )  WBC Count : 12.26 K/uL  RBC Count : 4.17 M/uL  Hemoglobin : 11.7 g/dL  Hematocrit : 36.4 %  Platelet Count - Automated : 328 K/uL  Mean Cell Volume : 87.3 fL  Mean Cell Hemoglobin : 28.1 pg  Mean Cell Hemoglobin Concentration : 32.1 g/dL  Auto Neutrophil # : 11.26 K/uL  Auto Lymphocyte # : 0.45 K/uL  Auto Monocyte # : 0.42 K/uL  Auto Eosinophil # : 0.00 K/uL  Auto Basophil # : 0.01 K/uL  Auto Neutrophil % : 91.8 %  Auto Lymphocyte % : 3.7 %  Auto Monocyte % : 3.4 %  Auto Eosinophil % : 0.0 %  Auto Basophil % : 0.1 %    07-26    135  |  98  |  26<H>  ----------------------------<  206<H>  5.1<H>   |  23  |  1.0    Ca    9.3      26 Jul 2020 05:24  Mg     2.2     07-24    TPro  6.2  /  Alb  3.9  /  TBili  0.4  /  DBili  x   /  AST  19  /  ALT  21  /  AlkPhos  89  07-26    Creatinine Trend: 1.0<--, 1.2<--, 0.9<--, 0.9<--, 0.9<--  LIVER FUNCTIONS - ( 26 Jul 2020 05:24 )  Alb: 3.9 g/dL / Pro: 6.2 g/dL / ALK PHOS: 89 U/L / ALT: 21 U/L / AST: 19 U/L / GGT: x           PT/INR - ( 26 Jul 2020 05:24 )   PT: 29.60 sec;   INR: 2.57 ratio         PTT - ( 26 Jul 2020 05:24 )  PTT:33.9 sec  CARDIAC MARKERS ( 24 Jul 2020 15:17 )  x     / 0.01 ng/mL / x     / x     / x          IMAGING:    Xray Chest 1 View- PORTABLE-Routine (07.25.20 @ 10:03)  Impression:  Improved but persistent bibasilar opacities/effusions.

## 2020-07-27 NOTE — PROGRESS NOTE ADULT - SUBJECTIVE AND OBJECTIVE BOX
Patient is a 80y old Female who presents with a chief complaint of COPD exacerbation (26 Jul 2020 23:59)    No acute events overnight. Patient has no complaints this morning. Denies chest pain, SOB, N/V/D,      Allergies  No Known Allergies        PAST MEDICAL & SURGICAL HISTORY  HTN (hypertension)  COPD without exacerbation  Afib  History of appendectomy  Kidney cysts      PHYSICAL EXAM  Vital Signs Last 24 Hrs  T(C): 36.5 (27 Jul 2020 05:08), Max: 36.5 (27 Jul 2020 05:08)  T(F): 97.7 (27 Jul 2020 05:08), Max: 97.7 (27 Jul 2020 05:08)  HR: 70 (27 Jul 2020 05:08) (64 - 74)  BP: 184/81 (27 Jul 2020 05:08) (157/68 - 195/77)  BP(mean): --  RR: 16 (27 Jul 2020 05:08) (16 - 18)  SpO2: 100% (26 Jul 2020 23:52) (100% - 100%)    I&O's Summary    26 Jul 2020 07:01  -  27 Jul 2020 06:52  --------------------------------------------------------  IN: 240 mL / OUT: 0 mL / NET: 240 mL      GENERAL: No acute distress, well-developed  HEAD:  Atraumatic, Normocephalic  ENT: PERRL, conjunctiva and sclera clear, neck supple, no JVD, moist mucosa, posterior oropharynx clear  CHEST/LUNG: Clear to auscultation bilaterally; No wheeze, equal breath sounds bilaterally, respirations nonlabored  HEART: Regular rate and rhythm; No murmurs, rubs, or gallops  ABDOMEN: Soft, nontender, nondistended; Bowel sounds present, no organomegaly  BACK: no spinal tenderness, no CVA tenderness  EXTREMITIES:  No clubbing, cyanosis, or edema  PSYCH: Nl behavior, nl affect  NEUROLOGY: AAOx3, non-focal, moves all extremities spontaneously  SKIN: Normal color, No rashes or lesions      LABS            IMAGING:    Xray Chest 1 View- PORTABLE-Routine (07.25.20 @ 10:03)  Impression:  Improved but persistent bibasilar opacities/effusions. Patient is a 80y old Female who presents with a chief complaint of COPD exacerbation (26 Jul 2020 23:59)    No acute events overnight. Patient has no complaints this morning. Denies chest pain, SOB, N/V/D,      Allergies  No Known Allergies        PAST MEDICAL & SURGICAL HISTORY  HTN (hypertension)  COPD without exacerbation  Afib  History of appendectomy  Kidney cysts      PHYSICAL EXAM  Vital Signs Last 24 Hrs  T(C): 36.5 (27 Jul 2020 05:08), Max: 36.5 (27 Jul 2020 05:08)  T(F): 97.7 (27 Jul 2020 05:08), Max: 97.7 (27 Jul 2020 05:08)  HR: 70 (27 Jul 2020 05:08) (64 - 74)  BP: 184/81 (27 Jul 2020 05:08) (157/68 - 195/77)  BP(mean): --  RR: 16 (27 Jul 2020 05:08) (16 - 18)  SpO2: 100% (26 Jul 2020 23:52) (100% - 100%)    I&O's Summary    26 Jul 2020 07:01  -  27 Jul 2020 06:52  --------------------------------------------------------  IN: 240 mL / OUT: 0 mL / NET: 240 mL      GENERAL: No acute distress, well-developed  HEAD:  Atraumatic, Normocephalic  CHEST/LUNG: Clear to auscultation bilaterally; No wheeze, equal breath sounds bilaterally, respirations nonlabored  HEART: Regular rate and rhythm; No murmurs, rubs, or gallops  ABDOMEN: Soft, nontender, nondistended; Bowel sounds present, no organomegaly  EXTREMITIES:  No clubbing, cyanosis, or edema  PSYCH: Nl behavior, nl affect  NEUROLOGY: AAOx3, non-focal, moves all extremities spontaneously  SKIN: Normal color, No rashes or lesions      LABS                            12.0   11.12 )-----------( 319      ( 27 Jul 2020 06:03 )             36.9     Hemoglobin: 12.0 g/dL (07-27 @ 06:03)  Hemoglobin: 11.7 g/dL (07-26 @ 05:24)  Hemoglobin: 12.6 g/dL (07-25 @ 05:55)  Hemoglobin: 12.2 g/dL (07-24 @ 15:17)    CBC Full  -  ( 27 Jul 2020 06:03 )  WBC Count : 11.12 K/uL  RBC Count : 4.15 M/uL  Hemoglobin : 12.0 g/dL  Hematocrit : 36.9 %  Platelet Count - Automated : 319 K/uL  Mean Cell Volume : 88.9 fL  Mean Cell Hemoglobin : 28.9 pg  Mean Cell Hemoglobin Concentration : 32.5 g/dL  Auto Neutrophil # : 9.90 K/uL  Auto Lymphocyte # : 0.53 K/uL  Auto Monocyte # : 0.59 K/uL  Auto Eosinophil # : 0.00 K/uL  Auto Basophil # : 0.01 K/uL  Auto Neutrophil % : 89.0 %  Auto Lymphocyte % : 4.8 %  Auto Monocyte % : 5.3 %  Auto Eosinophil % : 0.0 %  Auto Basophil % : 0.1 %    07-27    134<L>  |  96<L>  |  22<H>  ----------------------------<  172<H>  4.9   |  26  |  0.7    Ca    9.0      27 Jul 2020 06:03    TPro  6.3  /  Alb  3.9  /  TBili  0.4  /  DBili  x   /  AST  17  /  ALT  21  /  AlkPhos  82  07-27    Creatinine Trend: 0.7<--, 1.0<--, 1.2<--, 0.9<--, 0.9<--, 0.9<--  LIVER FUNCTIONS - ( 27 Jul 2020 06:03 )  Alb: 3.9 g/dL / Pro: 6.3 g/dL / ALK PHOS: 82 U/L / ALT: 21 U/L / AST: 17 U/L / GGT: x           PT/INR - ( 27 Jul 2020 06:03 )   PT: 38.60 sec;   INR: 3.36 ratio         PTT - ( 27 Jul 2020 06:03 )  PTT:32.8 sec          IMAGING:    Xray Chest 1 View- PORTABLE-Routine (07.25.20 @ 10:03)  Impression:  Improved but persistent bibasilar opacities/effusions.

## 2020-07-27 NOTE — PROGRESS NOTE ADULT - ASSESSMENT
Impression:  Acute Hypoxic Respiratory Failure secondary to COPD exacerbation, resolved  Chronic Respiratory failure secondary to severe COPD with emphysematous changes, on home O2  AFib on Coumadin    Plan:  Prednisone slow taper  c/w Symbicort bid  Start Tiotropium 18mcg qd  BiPAP 4h on/off as tolerated & HS  Duoneb q6h prn  Antihypertensive control  Maximize cardiac therapy and volume status  OP Pulm f/u

## 2020-07-27 NOTE — PROGRESS NOTE ADULT - ASSESSMENT
79 y/o woman with PMH of Afib on coumadin, HTN, hyperlipidemia, CAD, COPD and recently started on 2L supplemental oxygen presented to the ED for dyspnea and productive cough. Pt admits to baseline dyspnea, but it started to worsen last week. Hospital course complicated by junctional bradycardia and now Afib with RVR at times.    1. COPD exacerbation/chronic hypoxemic respiratory failure/ severe Pulm HTN (likely group 3)  improving on current therapy  Pulm f/u appreciated  slow steroid taper, nebs, symbicort, doxycycline  pt not using bipap - could only tolerate it for 2 hours last night  bipap prn    2. Afib with junctional bradycardia and now with RVR  keep off digoxin for now  cardio consult appreciated  cardizem dose increased to 180mg daily and monitor HR  on coumadin - hold dose tonight - goal INR 2-3    3. HTN - uncontrolled - cardizem restarted and monitor  on hydralazine  ACE-I stopped due to hyperkalemia    4. CAD/hyperlipidemia - on medical therapy    5. DVT prophylaxis on coumadin      PROGRESS NOTE HANDOFF    Pending: rate control of afib    Disposition: from home

## 2020-07-27 NOTE — PROGRESS NOTE ADULT - SUBJECTIVE AND OBJECTIVE BOX
Patient is a 80y old  Female who presents with a chief complaint of COPD exacerbation (27 Jul 2020 06:39)      HPI:  Patient is a 80 y.o female w/ hx of AFIB on dig/cardizem/warfarin, HTN, HLD, CAD, COPD, recently started on 2L supplemental  oxygen presents to the ED for evaluation of dyspnea and productive cough. Pt admits to baseline dyspnea, but started to worsen last week. She went to see her pulmonologist Dr thacker this past Monday and was started on 2L supplemental Oxygen and Trelegy Ellipta, and was advised to come to the hospital but she refused. Pt is presenting today because her SOB has not improved despite the new additions to her regimen. She has also been complaining of productive cough with white phlegm for about a month. She is a previous smoker, 1PPD for 65yrs, quit one year ago, she lives alone at home and denies any sick contact and states she has been compliant with her inhalers    In ED: pt saturating on 97% on RA, placed on BIPAP, Asymptomatic bradycardia 40-50BPM  ABG: Compensated respiratory Alkalosis  Chest X-ray: B/L opacities and Effusions (24 Jul 2020 16:46)      PAST MEDICAL & SURGICAL HISTORY:  HTN (hypertension)  COPD without exacerbation  Afib  History of appendectomy  Kidney cysts      SOCIAL HX:   Smoking                         ETOH                            Other    FAMILY HISTORY:  FH: HTN (hypertension)  .  No cardiovascular or pulmonary family history     REVIEW OF SYSTEMS:    All ROS are negative exept per HPI       Allergies    No Known Allergies    Intolerances          PHYSICAL EXAM  Vital Signs Last 24 Hrs  T(C): 36.5 (27 Jul 2020 05:08), Max: 36.5 (27 Jul 2020 05:08)  T(F): 97.7 (27 Jul 2020 05:08), Max: 97.7 (27 Jul 2020 05:08)  HR: 70 (27 Jul 2020 05:08) (64 - 74)  BP: 184/81 (27 Jul 2020 05:08) (157/68 - 195/77)  BP(mean): --  RR: 16 (27 Jul 2020 05:08) (16 - 18)  SpO2: 100% (26 Jul 2020 23:52) (100% - 100%)    CONSTITUTIONAL:  Well nourished.  NAD    ENT:   Airway patent,   No thrush    EYES:   Clear bilaterally,   pupils equal,   round and reactive to light.    CARDIAC:   Normal rate,   regular rhythm.  no edema    RESPIRATORY:  No wheezing  b/l LL minimal inspiratory crackles L > R    Decreased chest expansion  Not tachypneic,  No use of accessory muscles    GASTROINTESTINAL:  Abdomen soft, non-tender,   No guarding,   Positive BS    MUSCULOSKELETAL:   Range of motion is not limited,  Mild clubbing, No cyanosis    NEUROLOGICAL:   Alert and oriented   No motor deficits.  Anxious    SKIN:   Skin normal color for race,   No evidence of rash.      LABS:               12.0   11.12 )-----------( 319      ( 27 Jul 2020 06:03 )             36.9   07-27  134<L>  |  96<L>  |  22<H>  ----------------------------<  172<H>  4.9   |  26  |  0.7  Ca    9.0      27 Jul 2020 06:03  TPro  6.3  /  Alb  3.9  /  TBili  0.4  /  DBili  x   /  AST  17  /  ALT  21  /  AlkPhos  82  07-27  PT/INR - ( 27 Jul 2020 06:03 )   PT: 38.60 sec;   INR: 3.36 ratio    PTT - ( 27 Jul 2020 06:03 )  PTT:32.8 sec                                                                                     LIVER FUNCTIONS - ( 27 Jul 2020 06:03 )  Alb: 3.9 g/dL / Pro: 6.3 g/dL / ALK PHOS: 82 U/L / ALT: 21 U/L / AST: 17 U/L / GGT: x                                                                                                MEDICATIONS  (STANDING):  albuterol/ipratropium for Nebulization 3 milliLiter(s) Nebulizer every 6 hours  aspirin  chewable 81 milliGRAM(s) Oral daily  atorvastatin 10 milliGRAM(s) Oral at bedtime  budesonide 160 MICROgram(s)/formoterol 4.5 MICROgram(s) Inhaler 2 Puff(s) Inhalation two times a day  doxycycline IVPB 100 milliGRAM(s) IV Intermittent every 12 hours  hydrALAZINE 25 milliGRAM(s) Oral three times a day  methylPREDNISolone sodium succinate Injectable 40 milliGRAM(s) IV Push every 12 hours  pantoprazole    Tablet 40 milliGRAM(s) Oral before breakfast    MEDICATIONS  (PRN):  ALBUTerol    90 MICROgram(s) HFA Inhaler 2 Puff(s) Inhalation every 2 hours PRN Shortness of Breath and/or Wheezing      X-Rays reviewed:    CXR interpreted by me: Patient is a 80y old  Female who presents with a chief complaint of COPD exacerbation (27 Jul 2020 06:39)    HPI:  Patient is a 80 y.o female w/ hx of AFIB on dig/cardizem/warfarin, HTN, HLD, CAD, COPD, recently started on 2L supplemental  oxygen presents to the ED for evaluation of dyspnea and productive cough. Pt admits to baseline dyspnea, but started to worsen last week. She went to see her pulmonologist Dr thacker this past Monday and was started on 2L supplemental Oxygen and Trelegy Ellipta, and was advised to come to the hospital but she refused. Pt is presenting today because her SOB has not improved despite the new additions to her regimen. She has also been complaining of productive cough with white phlegm for about a month. She is a previous smoker, 1PPD for 65yrs, quit one year ago, she lives alone at home and denies any sick contact and states she has been compliant with her inhalers    In ED: pt saturating on 97% on RA, placed on BIPAP, Asymptomatic bradycardia 40-50BPM  ABG: Compensated respiratory Alkalosis  Chest X-ray: B/L opacities and Effusions (24 Jul 2020 16:46)    PAST MEDICAL & SURGICAL HISTORY:  HTN (hypertension)  COPD without exacerbation  Afib  History of appendectomy  Kidney cysts    SOCIAL HX:   Smoking                         ETOH                            Other    FAMILY HISTORY:  FH: HTN (hypertension)  .  No cardiovascular or pulmonary family history     REVIEW OF SYSTEMS:  All ROS are negative exept per HPI     Allergies  No Known Allergies  Intolerances      PHYSICAL EXAM  Vital Signs Last 24 Hrs  T(C): 36.5 (27 Jul 2020 05:08), Max: 36.5 (27 Jul 2020 05:08)  T(F): 97.7 (27 Jul 2020 05:08), Max: 97.7 (27 Jul 2020 05:08)  HR: 70 (27 Jul 2020 05:08) (64 - 74)  BP: 184/81 (27 Jul 2020 05:08) (157/68 - 195/77)  BP(mean): --  RR: 16 (27 Jul 2020 05:08) (16 - 18)  SpO2: 100% (26 Jul 2020 23:52) (100% - 100%)    CONSTITUTIONAL:  Frail. NAD. Anxious.    ENT:   Airway patent,   No thrush    EYES:   Clear bilaterally,   pupils equal,   round and reactive to light.    CARDIAC:   Normal rate,   irregular rhythm.  no edema    RESPIRATORY:  No wheezing  b/l LL minimal inspiratory crackles L > R  Decreased chest expansion  Decreased air flow  Not tachypneic,  No use of accessory muscles    GASTROINTESTINAL:  Abdomen soft, non-tender,   No guarding,   Positive BS    MUSCULOSKELETAL:   Range of motion is not limited,  Mild clubbing, No cyanosis    NEUROLOGICAL:   Alert and oriented   No motor deficits.    SKIN:   Skin normal color for race,   No evidence of rash.      LABS:               12.0   11.12 )-----------( 319      ( 27 Jul 2020 06:03 )             36.9   07-27  134<L>  |  96<L>  |  22<H>  ----------------------------<  172<H>  4.9   |  26  |  0.7  Ca    9.0      27 Jul 2020 06:03  TPro  6.3  /  Alb  3.9  /  TBili  0.4  /  DBili  x   /  AST  17  /  ALT  21  /  AlkPhos  82  07-27  PT/INR - ( 27 Jul 2020 06:03 )   PT: 38.60 sec;   INR: 3.36 ratio    PTT - ( 27 Jul 2020 06:03 )  PTT:32.8 sec                                            LIVER FUNCTIONS - ( 27 Jul 2020 06:03 )  Alb: 3.9 g/dL / Pro: 6.3 g/dL / ALK PHOS: 82 U/L / ALT: 21 U/L / AST: 17 U/L / GGT: x                                                     MEDICATIONS  (STANDING):  albuterol/ipratropium for Nebulization 3 milliLiter(s) Nebulizer every 6 hours  aspirin  chewable 81 milliGRAM(s) Oral daily  atorvastatin 10 milliGRAM(s) Oral at bedtime  budesonide 160 MICROgram(s)/formoterol 4.5 MICROgram(s) Inhaler 2 Puff(s) Inhalation two times a day  doxycycline IVPB 100 milliGRAM(s) IV Intermittent every 12 hours  hydrALAZINE 25 milliGRAM(s) Oral three times a day  methylPREDNISolone sodium succinate Injectable 40 milliGRAM(s) IV Push every 12 hours  pantoprazole    Tablet 40 milliGRAM(s) Oral before breakfast    MEDICATIONS  (PRN):  ALBUTerol    90 MICROgram(s) HFA Inhaler 2 Puff(s) Inhalation every 2 hours PRN Shortness of Breath and/or Wheezing      < from: Xray Chest 1 View- PORTABLE-Routine (07.25.20 @ 10:03) >  Impression:  Improved but persistent bibasilar opacities/effusions.  < end of copied text >

## 2020-07-27 NOTE — PROGRESS NOTE ADULT - ASSESSMENT
Patient is a 80 y.o female w/ hx of AFIB on dig/ Cardizem /warfarin, HTN, HLD, CAD, COPD, recently started on 2L supplemental  oxygen presents to the ED for evaluation of dyspnea and productive cough.     Hypoxemia secondary to chronic COPD  - Presented with worsening SOB and productive cough, desatturation to 91% on RA, states she is compliant with medication  - Never been intubated, former smoker 65 pack year hx, last COPD exacerbation was Sept 2019  - VBG with respiratory alkalosis, compensated   - Pulm recs appreciated:  c/w Solumedrol, inhalers, duoneb and BiPAP 4hrs on// 4hrs off   - C/w Doxycycline to complete 5   - CXR (7/25) improved from previous, f/u today    AFIB:  Presenting with Sinus Bradycardia and Subtherapeutic INR   - Takes Digoxin and Cardizem for rate control (Dig Level is therapeutic)  - Continue AC with coumadin, takes 5mg daily   - Hold digoxin given hyperkalemia and sinus bradycardia, c/w Cardizem 180 qd (home dose 240) for rate control, hold if laura.   - Cards consulted: ekg appears to show junctional laura and unchanged inferolateral st abnormality (compared to 2019). If still laura despite holding cardizem / dig --> may benefit from ep eval and/or stress testing for chronotrop competence.   - Check TSH / T4    Essential HTN  - C/w Enalapril and Nifedipine XL daily    CAD + HLD  - Echo (7/26): EF 58%, severe pulm HTN  - ECHO 2019: EF: 50-55%, Mild- mod mitral valve regurgitation  - Stress test from 2019: unremarkable  - C/w ASA and Statin     # Diet: DASH/TLC  # DVT Prophylaxis: warfarin  # GI Prophylaxis: protonix  # Activity: IAT  # Dispo: from home  # Code Status: Full code Patient is a 80 y.o female w/ hx of AFIB on dig/ Cardizem /warfarin, HTN, HLD, CAD, COPD, recently started on 2L supplemental  oxygen presents to the ED for evaluation of dyspnea and productive cough.     Hypoxemia secondary to chronic COPD  - Presented with worsening SOB and productive cough, desatturation to 91% on RA, states she is compliant with medication  - Never been intubated, former smoker 65 pack year hx, last COPD exacerbation was Sept 2019  - VBG with respiratory alkalosis, compensated   - Pulm recs appreciated:  c/w Solumedrol, inhalers, duoneb and BiPAP 4hrs on// 4hrs off   - C/w Doxycycline to complete 5   - CXR (7/25) improved from previous, f/u today    AFIB:  Presenting with Sinus Bradycardia and Subtherapeutic INR   - Takes Digoxin and Cardizem for rate control (Dig Level is therapeutic)  - Continue AC with coumadin, takes 5mg daily   - Hold digoxin given hyperkalemia and sinus bradycardia, c/w Cardizem 180 qd (home dose 240) for rate control, hold if laura.   - Cards consulted: ekg appears to show junctional laura and unchanged inferolateral st abnormality (compared to 2019). If still laura despite holding cardizem / dig --> may benefit from ep eval and/or stress testing for chronotrop competence.   - F/u TSH / T4  - Cardizem 120mg qd restarted    Essential HTN  - C/w Enalapril and Nifedipine XL daily    CAD + HLD  - Echo (7/26): EF 58%, severe pulm HTN  - ECHO 2019: EF: 50-55%, Mild- mod mitral valve regurgitation  - Stress test from 2019: unremarkable  - C/w ASA and Statin     # Diet: DASH/TLC  # DVT Prophylaxis: warfarin  # GI Prophylaxis: protonix  # Activity: IAT  # Dispo: from home  # Code Status: Full code

## 2020-07-27 NOTE — PROGRESS NOTE ADULT - SUBJECTIVE AND OBJECTIVE BOX
CHET GENIE  80y Female    INTERVAL HPI/OVERNIGHT EVENTS:    Pt seen this morning - felt well when I saw her except for weakness.  No chest pain or SOB.  Case discussed with pulmonary attending today.  Pt developed Afib with RVR this afternoon - was just restarted on cardizem 120mg daily.    T(F): 97.1 (20 @ 12:33), Max: 97.7 (20 @ 05:08)  HR: 118 (20 @ 12:33) (70 - 118)  BP: 192/114 (20 @ 12:33) (157/68 - 195/77)  RR: 17 (20 @ 12:33) (16 - 18)  SpO2: 100% (20 @ 23:52) (100% - 100%) on 3L NC    I&O's Summary    2020 07:  -  2020 07:00  --------------------------------------------------------  IN: 240 mL / OUT: 0 mL / NET: 240 mL    2020 07:01  -  2020 16:14  --------------------------------------------------------  IN: 0 mL / OUT: 100 mL / NET: -100 mL    Daily Weight in k (2020 05:08)    CAPILLARY BLOOD GLUCOSE      POCT Blood Glucose.: 213 mg/dL (2020 12:29)        PHYSICAL EXAM:  GENERAL: NAD  HEAD:  Normocephalic  EYES:  conjunctiva and sclera clear  ENMT: Moist mucous membranes  NECK: Supple, No JVD  NERVOUS SYSTEM:  Alert, awake, Good concentration  CHEST/LUNG: decreased BS b/l  HEART: IRR  ABDOMEN: Soft, Nontender, Nondistended  EXTREMITIES:  No edema      Consultant(s) Notes Reviewed:  [x ] YES  [ ] NO  Care Discussed with Consultants/Other Providers [ x] YES  [ ] NO    MEDICATIONS  (STANDING):  albuterol/ipratropium for Nebulization 3 milliLiter(s) Nebulizer every 6 hours  aspirin  chewable 81 milliGRAM(s) Oral daily  atorvastatin 10 milliGRAM(s) Oral at bedtime  budesonide 160 MICROgram(s)/formoterol 4.5 MICROgram(s) Inhaler 2 Puff(s) Inhalation two times a day  dextrose 5%. 1000 milliLiter(s) (50 mL/Hr) IV Continuous <Continuous>  dextrose 50% Injectable 12.5 Gram(s) IV Push once  dextrose 50% Injectable 25 Gram(s) IV Push once  dextrose 50% Injectable 25 Gram(s) IV Push once  doxycycline IVPB 100 milliGRAM(s) IV Intermittent every 12 hours  hydrALAZINE 25 milliGRAM(s) Oral three times a day  pantoprazole    Tablet 40 milliGRAM(s) Oral before breakfast    MEDICATIONS  (PRN):  ALBUTerol    90 MICROgram(s) HFA Inhaler 2 Puff(s) Inhalation every 2 hours PRN Shortness of Breath and/or Wheezing  dextrose 40% Gel 15 Gram(s) Oral once PRN Blood Glucose LESS THAN 70 milliGRAM(s)/deciLiter  glucagon  Injectable 1 milliGRAM(s) IntraMuscular once PRN Glucose <70 milliGRAM(s)/deciLiter  zolpidem 5 milliGRAM(s) Oral at bedtime PRN Insomnia    EKG reviewed  Telemetry reviewed    LABS:                        12.0   1112 )-----------( 319      ( 2020 06:03 )             36.9     07-27    134<L>  |  96<L>  |  22<H>  ----------------------------<  172<H>  4.9   |  26  |  0.7    Ca    9.0      2020 06:03    TPro  6.3  /  Alb  3.9  /  TBili  0.4  /  DBili  x   /  AST  17  /  ALT  21  /  AlkPhos  82  07-27    PT/INR - ( 2020 06:03 )   PT: 38.60 sec;   INR: 3.36 ratio         PTT - ( 2020 06:03 )  PTT:32.8 sec          RADIOLOGY & ADDITIONAL TESTS:    Imaging or report Personally Reviewed:  [x ] YES  [ ] NO    < from: Transthoracic Echocardiogram (20 @ 11:24) >  Summary:   1. LV Ejection Fraction by Steve's Method with a biplane EF of 58 %.   2. Right atrial enlargement.   3. Mild to moderate mitral valve regurgitation.   4. Mitral annular calcification.   5. Thickening and calcification of the anterior and posterior mitral valve leaflets.   6. Mild-moderate tricuspid regurgitation.   7. Estimated pulmonary artery systolic pressure is 72.2 mmHg assuming a right atrial pressure of 15 mmHg, which is consistent with severe pulmonary hypertension.    < end of copied text >    < from: Xray Chest 1 View- PORTABLE-Routine (20 @ 10:03) >  Impression:    Improved but persistent bibasilar opacities/effusions.      < end of copied text >      Case discussed with residents    Care discussed with pt and family

## 2020-07-28 NOTE — PROGRESS NOTE ADULT - SUBJECTIVE AND OBJECTIVE BOX
Patient is a 80y old Female who presents with a chief complaint of COPD exacerbation (28 Jul 2020 06:15)    Pt had a short run of NSVT overnight. Patient has no complaints this morning. Denies chest pain, SOB, N/V/D,      Allergies  No Known Allergies      PAST MEDICAL & SURGICAL HISTORY:  HTN (hypertension)  COPD without exacerbation  Afib  History of appendectomy  Kidney cysts      PHYSICAL EXAM  Vital Signs Last 24 Hrs  T(C): 36.4 (28 Jul 2020 05:04), Max: 36.4 (28 Jul 2020 05:04)  T(F): 97.5 (28 Jul 2020 05:04), Max: 97.5 (28 Jul 2020 05:04)  HR: 68 (28 Jul 2020 06:30) (66 - 118)  BP: 180/70 (28 Jul 2020 06:30) (164/71 - 192/114)  BP(mean): --  RR: 18 (28 Jul 2020 05:04) (17 - 18)  SpO2: 100% (28 Jul 2020 06:30) (100% - 100%)    I&O's Summary    27 Jul 2020 07:01  -  28 Jul 2020 07:00  --------------------------------------------------------  IN: 0 mL / OUT: 300 mL / NET: -300 mL      GENERAL: No acute distress, well-developed  HEAD:  Atraumatic, Normocephalic  CHEST/LUNG: Clear to auscultation bilaterally; No wheeze, equal breath sounds bilaterally, respirations nonlabored  HEART: Regular rate and rhythm; No murmurs, rubs, or gallops  ABDOMEN: Soft, nontender, nondistended; Bowel sounds present, no organomegaly  EXTREMITIES:  No clubbing, cyanosis, or edema  PSYCH: Nl behavior, nl affect  NEUROLOGY: AAOx3, non-focal, moves all extremities spontaneously  SKIN: Normal color, No rashes or lesions      LABS                        11.9   11.82 )-----------( 309      ( 28 Jul 2020 06:02 )             37.3     Hemoglobin: 11.9 g/dL (07-28 @ 06:02)  Hemoglobin: 12.0 g/dL (07-27 @ 06:03)  Hemoglobin: 11.7 g/dL (07-26 @ 05:24)  Hemoglobin: 12.6 g/dL (07-25 @ 05:55)  Hemoglobin: 12.2 g/dL (07-24 @ 15:17)    CBC Full  -  ( 28 Jul 2020 06:02 )  WBC Count : 11.82 K/uL  RBC Count : 4.18 M/uL  Hemoglobin : 11.9 g/dL  Hematocrit : 37.3 %  Platelet Count - Automated : 309 K/uL  Mean Cell Volume : 89.2 fL  Mean Cell Hemoglobin : 28.5 pg  Mean Cell Hemoglobin Concentration : 31.9 g/dL  Auto Neutrophil # : 9.13 K/uL  Auto Lymphocyte # : 1.24 K/uL  Auto Monocyte # : 1.25 K/uL  Auto Eosinophil # : 0.09 K/uL  Auto Basophil # : 0.01 K/uL  Auto Neutrophil % : 77.2 %  Auto Lymphocyte % : 10.5 %  Auto Monocyte % : 10.6 %  Auto Eosinophil % : 0.8 %  Auto Basophil % : 0.1 %    07-28    138  |  101  |  22<H>  ----------------------------<  117<H>  4.6   |  28  |  0.8    Ca    8.9      28 Jul 2020 06:02    TPro  6.3  /  Alb  3.9  /  TBili  0.4  /  DBili  x   /  AST  17  /  ALT  21  /  AlkPhos  82  07-27    Creatinine Trend: 0.8<--, 0.7<--, 1.0<--, 1.2<--, 0.9<--, 0.9<--  LIVER FUNCTIONS - ( 27 Jul 2020 06:03 )  Alb: 3.9 g/dL / Pro: 6.3 g/dL / ALK PHOS: 82 U/L / ALT: 21 U/L / AST: 17 U/L / GGT: x           PT/INR - ( 28 Jul 2020 06:02 )   PT: >40.00 sec;   INR: 3.83 ratio         PTT - ( 27 Jul 2020 06:03 )  PTT:32.8 sec

## 2020-07-28 NOTE — PROGRESS NOTE ADULT - ASSESSMENT
81 y/o woman with PMH of Afib on coumadin, HTN, hyperlipidemia, CAD, COPD and recently started on 2L supplemental oxygen presented to the ED for dyspnea and productive cough. Pt admits to baseline dyspnea, but it started to worsen last week. Hospital course complicated by junctional bradycardia and now Afib with RVR at times and HTN.    1. COPD exacerbation/chronic hypoxemic respiratory failure/ severe Pulm HTN (likely group 3)  improving on current therapy  Pulm f/u appreciated  slow steroid taper, nebs, symbicort  doxycycline x total of 5 days  pt not using bipap   outpt Pulm f/u    2. Afib with junctional bradycardia and now with RVR  keep off digoxin for now  cardio consult appreciated  cardizem dose increased to 180mg daily and monitor HR  on coumadin - hold dose tonight - goal INR 2-3    3. HTN - uncontrolled - cardizem restarted and pt on hydralazine  restart enalapril - hyperkalemia now resolved and pt was on it x 35 years without difficulty  monitor BP    4. CAD/hyperlipidemia - on medical therapy    5. DVT prophylaxis on coumadin    6. NSVT - continue telemetry  check Mag level and replete for level < 2  K - WNL      PROGRESS NOTE HANDOFF    Pending: rate control of afib, better BP control    Disposition: from home    Family discussion: will speak to daughter in law re: plan of care per pt request 81 y/o woman with PMH of Afib on coumadin, HTN, hyperlipidemia, CAD, COPD and recently started on 2L supplemental oxygen presented to the ED for dyspnea and productive cough. Pt admits to baseline dyspnea, but it started to worsen last week. Hospital course complicated by junctional bradycardia and now Afib with RVR at times and HTN.    1. COPD exacerbation/chronic hypoxemic respiratory failure/ severe Pulm HTN (likely group 3)  improving on current therapy  Pulm f/u appreciated  slow steroid taper, nebs, symbicort  doxycycline x total of 5 days  pt not using bipap   outpt Pulm f/u    2. Afib with junctional bradycardia and now with RVR  keep off digoxin for now  cardio consult appreciated  cardizem dose increased to 180mg daily and monitor HR  on coumadin - hold dose tonight - goal INR 2-3    3. HTN - uncontrolled - cardizem restarted and pt on hydralazine  restart enalapril - hyperkalemia now resolved and pt was on it x 35 years without difficulty  monitor BP    4. CAD/hyperlipidemia - on medical therapy    5. DVT prophylaxis on coumadin    6. NSVT - continue telemetry  check Mag level and replete for level < 2  K - WNL      PROGRESS NOTE HANDOFF    Pending: rate control of afib, better BP control    Disposition: from home    Family discussion: will speak to daughter in law re: plan of care per pt request      Addendum: plan of care discussed with pt and daughter at bedside today.  Daughter is interested in hospice care at home - consult placed.  Discussed with pt re: hospice and questions answered.  Plan for now is either home with services vs home with hospice (depending on pt's choice).  Pt is adamantly refusing SNF.  RN present during conversation.

## 2020-07-28 NOTE — PROGRESS NOTE ADULT - ASSESSMENT
Impression:  Acute Hypoxic Respiratory Failure secondary to COPD exacerbation, resolved  Chronic Respiratory failure secondary to severe COPD with emphysematous changes, on home O2  AFib on Coumadin    Plan:  Prednisone slow taper  c/w Symbicort bid  Start Tiotropium 18mcg qd  BiPAP 4h on/off as tolerated & HS  c/w supplemental O2  Duoneb q6h prn  Antihypertensive control  Maximize cardiac therapy and volume status  OP Pulm f/u

## 2020-07-28 NOTE — PROGRESS NOTE ADULT - ASSESSMENT
Patient is a 80 y.o female w/ hx of AFIB on dig/ Cardizem /warfarin, HTN, HLD, CAD, COPD, recently started on 2L supplemental  oxygen presents to the ED for evaluation of dyspnea and productive cough.     Hypoxemia secondary to chronic COPD  - Presented with worsening SOB and productive cough, desatturation to 91% on RA, states she is compliant with medication  - Never been intubated, former smoker 65 pack year hx, last COPD exacerbation was Sept 2019  - VBG with respiratory alkalosis, compensated   - Pulm recs appreciated:  d/c Solumedrol, started Prednisone. C/w inhalers, duoneb and BiPAP 4hrs on// 4hrs off (patient not tolerating BiPAP)  - C/w Doxycycline to complete 5 days   - CXR (7/25) improved from previous, f/u today    AFIB:  Presenting with Sinus Bradycardia and Subtherapeutic INR   - Takes Digoxin and Cardizem for rate control (Dig Level is therapeutic)  - Continue AC with coumadin, takes 5mg daily   - Hold digoxin given hyperkalemia and sinus bradycardia, c/w Cardizem 180 qd (home dose 240) for rate control, hold if laura.   - Cards consulted: may benefit from ep eval and/or stress testing for chronotrop competence.   - F/u TSH / T4  - Cardizem 180 qd restarted    Essential HTN  - C/w Enalapril and Nifedipine XL daily    CAD + HLD  - Echo (7/26): EF 58%, severe pulm HTN  - ECHO 2019: EF: 50-55%, Mild- mod mitral valve regurgitation  - Stress test from 2019: unremarkable  - C/w ASA and Statin     # Diet: DASH/TLC  # DVT Prophylaxis: warfarin  # GI Prophylaxis: protonix  # Activity: IAT  # Dispo: from home  # Code Status: Full code Patient is a 80 y.o female w/ hx of AFIB on dig/ Cardizem /warfarin, HTN, HLD, CAD, COPD, recently started on 2L supplemental  oxygen presents to the ED for evaluation of dyspnea and productive cough.     Hypoxemia secondary to chronic COPD  - Presented with worsening SOB and productive cough, desatturation to 91% on RA, states she is compliant with medication  - Never been intubated, former smoker 65 pack year hx, last COPD exacerbation was Sept 2019  - VBG with respiratory alkalosis, compensated   - Pulm recs appreciated:  d/c Solumedrol, started Prednisone. C/w inhalers, duoneb and BiPAP 4hrs on// 4hrs off (patient not tolerating BiPAP)  - C/w Doxycycline to complete 5 days   - CXR (7/25) improved from previous, f/u today    AFIB:  Presenting with Junctional Bradycardia now with RVR  - Takes Digoxin and Cardizem for rate control (Dig Level is therapeutic)  - Continue AC with coumadin, takes 5mg daily (held yesterday)  - Holding digoxin  - Cards consulted: may benefit from ep eval and/or stress testing for chronotrop competence.   - Cardizem 180 qd restarted    Essential HTN  - On hydralazine 25mg TID  - Cardizem restarted    CAD + HLD  - Echo (7/26): EF 58%, severe pulm HTN  - ECHO 2019: EF: 50-55%, Mild- mod mitral valve regurgitation  - Stress test from 2019: unremarkable  - C/w ASA and Statin     # Diet: DASH/TLC  # DVT Prophylaxis: warfarin  # GI Prophylaxis: protonix  # Activity: IAT  # Dispo: from home  # Code Status: Full code Patient is a 80 y.o female w/ hx of AFIB on dig/ Cardizem /warfarin, HTN, HLD, CAD, COPD, recently started on 2L supplemental  oxygen presents to the ED for evaluation of dyspnea and productive cough.     Acute COPD Exacerbation  - Presented with worsening SOB and productive cough, desatturation to 91% on RA, states she is compliant with medication  - Never been intubated, former smoker 65 pack year hx, last COPD exacerbation was Sept 2019  - VBG with respiratory alkalosis, compensated   - Pulm recs appreciated:  d/c Solumedrol, started Prednisone. C/w inhalers, duoneb and BiPAP 4hrs on// 4hrs off (patient not tolerating BiPAP)  - C/w Doxycycline to complete 5 days   - CXR (7/25) improved from previous    AFIB:  Presenting with Junctional Bradycardia now with RVR  - Takes Digoxin and Cardizem for rate control (Dig Level is therapeutic)  - INR 3.8, continue to hold Coumadin  - Holding digoxin  - Short run of NSVT overnight, f/u Mg  - Cardizem 180 qd restarted    Essential HTN  - On hydralazine 25mg TID  - Restarted home Vasotec 5mg qd  - On Cardizem    CAD + HLD  - Echo (7/26): EF 58%, severe pulm HTN  - ECHO 2019: EF: 50-55%, Mild- mod mitral valve regurgitation  - Stress test from 2019: unremarkable  - C/w ASA and Statin     # Diet: DASH/TLC  # DVT Prophylaxis: warfarin  # GI Prophylaxis: protonix  # Activity: IAT  # Dispo: from home  # Code Status: Full code

## 2020-07-28 NOTE — PROGRESS NOTE ADULT - SUBJECTIVE AND OBJECTIVE BOX
GENIE ROSENBERG  80y Female    INTERVAL HPI/OVERNIGHT EVENTS:    Pt states breathing is better.  c/o insomnia - states that melatonin is ineffective.  She has Ambien ordered prn.    T(F): 97.5 (07-28-20 @ 05:04), Max: 97.5 (07-28-20 @ 05:04)  HR: 68 (07-28-20 @ 06:30) (66 - 118)  BP: 180/70 (07-28-20 @ 06:30) (164/71 - 192/114)  RR: 18 (07-28-20 @ 05:04) (17 - 18)  SpO2: 100% (07-28-20 @ 06:30) (100% - 100%) on 2L NC    I&O's Summary    27 Jul 2020 07:01  -  28 Jul 2020 07:00  --------------------------------------------------------  IN: 0 mL / OUT: 300 mL / NET: -300 mL      CAPILLARY BLOOD GLUCOSE      POCT Blood Glucose.: 213 mg/dL (27 Jul 2020 12:29)        PHYSICAL EXAM:  GENERAL: NAD in bed  HEAD:  Normocephalic  EYES:  conjunctiva and sclera clear  ENMT: Moist mucous membranes  NECK: Supple  NERVOUS SYSTEM:  Alert, awake, Good concentration  CHEST/LUNG: Clear to percussion bilaterally but decreased BS; No wheezing  HEART: IRR  ABDOMEN: Soft, Nontender, Nondistended  EXTREMITIES:  No edema      Consultant(s) Notes Reviewed:  [x ] YES  [ ] NO  Care Discussed with Consultants/Other Providers [ x] YES  [ ] NO    MEDICATIONS  (STANDING):  aspirin  chewable 81 milliGRAM(s) Oral daily  atorvastatin 10 milliGRAM(s) Oral at bedtime  budesonide 160 MICROgram(s)/formoterol 4.5 MICROgram(s) Inhaler 2 Puff(s) Inhalation two times a day  dextrose 5%. 1000 milliLiter(s) (50 mL/Hr) IV Continuous <Continuous>  dextrose 50% Injectable 12.5 Gram(s) IV Push once  dextrose 50% Injectable 25 Gram(s) IV Push once  dextrose 50% Injectable 25 Gram(s) IV Push once  diltiazem    milliGRAM(s) Oral daily  doxycycline hyclate Capsule 100 milliGRAM(s) Oral every 12 hours  enalapril 5 milliGRAM(s) Oral every 24 hours  hydrALAZINE 25 milliGRAM(s) Oral three times a day  pantoprazole    Tablet 40 milliGRAM(s) Oral before breakfast  predniSONE   Tablet 40 milliGRAM(s) Oral daily  tiotropium 18 MICROgram(s) Capsule 1 Capsule(s) Inhalation daily    MEDICATIONS  (PRN):  ALBUTerol    90 MICROgram(s) HFA Inhaler 2 Puff(s) Inhalation every 2 hours PRN Shortness of Breath and/or Wheezing  dextrose 40% Gel 15 Gram(s) Oral once PRN Blood Glucose LESS THAN 70 milliGRAM(s)/deciLiter  glucagon  Injectable 1 milliGRAM(s) IntraMuscular once PRN Glucose <70 milliGRAM(s)/deciLiter  zolpidem 5 milliGRAM(s) Oral at bedtime PRN Insomnia    Telemetry reviewed - episodes of NSVT    LABS:                        11.9   11.82 )-----------( 309      ( 28 Jul 2020 06:02 )             37.3     07-28    138  |  101  |  22<H>  ----------------------------<  117<H>  4.6   |  28  |  0.8    Ca    8.9      28 Jul 2020 06:02    TPro  6.3  /  Alb  3.9  /  TBili  0.4  /  DBili  x   /  AST  17  /  ALT  21  /  AlkPhos  82  07-27    PT/INR - ( 28 Jul 2020 06:02 )   PT: >40.00 sec;   INR: 3.83 ratio         PTT - ( 27 Jul 2020 06:03 )  PTT:32.8 sec            Case discussed with residents    Care discussed with pt and family

## 2020-07-28 NOTE — PROGRESS NOTE ADULT - SUBJECTIVE AND OBJECTIVE BOX
HPI:  Patient is a 80 y.o female w/ hx of AFIB on dig/cardizem/warfarin, HTN, HLD, CAD, COPD, recently started on 2L supplemental  oxygen presents to the ED for evaluation of dyspnea and productive cough. Pt admits to baseline dyspnea, but started to worsen last week. She went to see her pulmonologist Dr thacker this past Monday and was started on 2L supplemental Oxygen and Trelegy Ellipta, and was advised to come to the hospital but she refused. Pt is presenting today because her SOB has not improved despite the new additions to her regimen. She has also been complaining of productive cough with white phlegm for about a month. She is a previous smoker, 1PPD for 65yrs, quit one year ago, she lives alone at home and denies any sick contact and states she has been compliant with her inhalers    In ED: pt saturating on 97% on RA, placed on BIPAP, Asymptomatic bradycardia 40-50BPM  ABG: Compensated respiratory Alkalosis  Chest X-ray: B/L opacities and Effusions (24 Jul 2020 16:46)    PAST MEDICAL & SURGICAL HISTORY:  HTN (hypertension)  COPD without exacerbation  Afib  History of appendectomy  Kidney cysts    SOCIAL HX:   Smoking                         ETOH                            Other    FAMILY HISTORY:  FH: HTN (hypertension)  .  No cardiovascular or pulmonary family history     REVIEW OF SYSTEMS:  All ROS are negative exept per HPI     Allergies  No Known Allergies  Intolerances      PHYSICAL EXAM  Vital Signs Last 24 Hrs  T(C): 36.4 (28 Jul 2020 05:04), Max: 36.4 (28 Jul 2020 05:04)  T(F): 97.5 (28 Jul 2020 05:04), Max: 97.5 (28 Jul 2020 05:04)  HR: 68 (28 Jul 2020 06:30) (66 - 118)  BP: 180/70 (28 Jul 2020 06:30) (164/71 - 192/114)  BP(mean): --  RR: 18 (28 Jul 2020 05:04) (17 - 18)  SpO2: 100% (28 Jul 2020 06:30) (100% - 100%)    CONSTITUTIONAL:  NAD    ENT:   Airway patent,   No thrush    EYES:   Clear bilaterally,   pupils equal,   round and reactive to light.    CARDIAC:   Normal rate,   irregular rhythm.  no edema    RESPIRATORY:  No wheezing  b/l basilar minimal inspiratory crackles  Decreased chest expansion  Decreased air flow  Not tachypneic,  No use of accessory muscles    GASTROINTESTINAL:  Abdomen soft, non-tender,   No guarding,   Positive BS    MUSCULOSKELETAL:   Range of motion is not limited,  Mild clubbing, No cyanosis    NEUROLOGICAL:   Alert and oriented   No motor deficits.    SKIN:   Skin normal color for race,   No evidence of rash.      LABS:                        11.9   11.82 )-----------( 309      ( 28 Jul 2020 06:02 )             37.3     07-28    138  |  101  |  22<H>  ----------------------------<  117<H>  4.6   |  28  |  0.8    Ca    8.9      28 Jul 2020 06:02    TPro  6.3  /  Alb  3.9  /  TBili  0.4  /  DBili  x   /  AST  17  /  ALT  21  /  AlkPhos  82  07-27       PT/INR - ( 28 Jul 2020 06:02 )   PT: >40.00 sec;   INR: 3.83 ratio    PTT - ( 27 Jul 2020 06:03 )  PTT:32.8 sec                                                MEDICATIONS  (STANDING):  aspirin  chewable 81 milliGRAM(s) Oral daily  atorvastatin 10 milliGRAM(s) Oral at bedtime  budesonide 160 MICROgram(s)/formoterol 4.5 MICROgram(s) Inhaler 2 Puff(s) Inhalation two times a day  dextrose 5%. 1000 milliLiter(s) (50 mL/Hr) IV Continuous <Continuous>  dextrose 50% Injectable 12.5 Gram(s) IV Push once  dextrose 50% Injectable 25 Gram(s) IV Push once  dextrose 50% Injectable 25 Gram(s) IV Push once  diltiazem    milliGRAM(s) Oral daily  doxycycline hyclate Capsule 100 milliGRAM(s) Oral every 12 hours  enalapril 5 milliGRAM(s) Oral every 24 hours  hydrALAZINE 25 milliGRAM(s) Oral three times a day  pantoprazole    Tablet 40 milliGRAM(s) Oral before breakfast  predniSONE   Tablet 40 milliGRAM(s) Oral daily  tiotropium 18 MICROgram(s) Capsule 1 Capsule(s) Inhalation daily    MEDICATIONS  (PRN):  ALBUTerol    90 MICROgram(s) HFA Inhaler 2 Puff(s) Inhalation every 2 hours PRN Shortness of Breath and/or Wheezing  dextrose 40% Gel 15 Gram(s) Oral once PRN Blood Glucose LESS THAN 70 milliGRAM(s)/deciLiter  glucagon  Injectable 1 milliGRAM(s) IntraMuscular once PRN Glucose <70 milliGRAM(s)/deciLiter  zolpidem 5 milliGRAM(s) Oral at bedtime PRN Insomnia      < from: Xray Chest 1 View- PORTABLE-Routine (07.25.20 @ 10:03) >  Impression:  Improved but persistent bibasilar opacities/effusions.  < end of copied text >

## 2020-07-29 NOTE — PROVIDER CONTACT NOTE (OTHER) - SITUATION
patient noted with 3.5 second pause . asymptomatic at this time . MD Cristina Made aware and at bedside examining patient. Ordered EKG Will continue to monitor.

## 2020-07-29 NOTE — PROGRESS NOTE ADULT - SUBJECTIVE AND OBJECTIVE BOX
Patient is a 80y old Female who presents with a chief complaint of COPD exacerbation (29 Jul 2020 09:24)    Patient alternating between tachycardia and bradycardia overnight with episodes of 3-5 second pauses. Of note, patient is not using BiPAP overnight as she can not tolerate it. Cardizem and Metoprolol discontinued. Patient has no complaints this morning. Denies chest pain, SOB, N/V/D,    Allergies  No Known Allergies      PAST MEDICAL & SURGICAL HISTORY  HTN (hypertension)  COPD without exacerbation  Afib  History of appendectomy  Kidney cysts      PHYSICAL EXAM  Vital Signs Last 24 Hrs  T(C): 36.1 (29 Jul 2020 05:49), Max: 36.4 (28 Jul 2020 20:41)  T(F): 97 (29 Jul 2020 05:49), Max: 97.6 (28 Jul 2020 20:41)  HR: 66 (29 Jul 2020 05:49) (57 - 120)  BP: 165/76 (29 Jul 2020 05:49) (147/90 - 195/77)  BP(mean): --  RR: 18 (29 Jul 2020 05:49) (18 - 20)  SpO2: 98% (29 Jul 2020 06:39) (97% - 98%)    I&O's Summary    28 Jul 2020 07:01  -  29 Jul 2020 07:00  --------------------------------------------------------  IN: 120 mL / OUT: 850 mL / NET: -730 mL    29 Jul 2020 07:01  -  29 Jul 2020 10:08  --------------------------------------------------------  IN: 210 mL / OUT: 0 mL / NET: 210 mL      GENERAL: No acute distress, well-developed  HEAD:  Atraumatic, Normocephalic  CHEST/LUNG: Clear to auscultation bilaterally; No wheeze, equal breath sounds bilaterally, respirations nonlabored  HEART: Regular rate and rhythm; No murmurs, rubs, or gallops  ABDOMEN: Soft, nontender, nondistended; Bowel sounds present, no organomegaly  EXTREMITIES:  No clubbing, cyanosis, or edema  PSYCH: Nl behavior, nl affect  NEUROLOGY: AAOx3, non-focal, moves all extremities spontaneously  SKIN: Normal color, No rashes or lesions      LABS                        11.7   9.44  )-----------( 305      ( 29 Jul 2020 05:57 )             37.1     Hemoglobin: 11.7 g/dL (07-29 @ 05:57)  Hemoglobin: 11.9 g/dL (07-28 @ 06:02)  Hemoglobin: 12.0 g/dL (07-27 @ 06:03)  Hemoglobin: 11.7 g/dL (07-26 @ 05:24)  Hemoglobin: 12.6 g/dL (07-25 @ 05:55)    CBC Full  -  ( 29 Jul 2020 05:57 )  WBC Count : 9.44 K/uL  RBC Count : 4.05 M/uL  Hemoglobin : 11.7 g/dL  Hematocrit : 37.1 %  Platelet Count - Automated : 305 K/uL  Mean Cell Volume : 91.6 fL  Mean Cell Hemoglobin : 28.9 pg  Mean Cell Hemoglobin Concentration : 31.5 g/dL  Auto Neutrophil # : 6.55 K/uL  Auto Lymphocyte # : 1.47 K/uL  Auto Monocyte # : 1.10 K/uL  Auto Eosinophil # : 0.23 K/uL  Auto Basophil # : 0.01 K/uL  Auto Neutrophil % : 69.4 %  Auto Lymphocyte % : 15.6 %  Auto Monocyte % : 11.7 %  Auto Eosinophil % : 2.4 %  Auto Basophil % : 0.1 %    07-29    141  |  101  |  28<H>  ----------------------------<  87  4.5   |  31  |  0.8    Ca    8.4<L>      29 Jul 2020 05:57      Creatinine Trend: 0.8<--, 0.8<--, 0.7<--, 1.0<--, 1.2<--, 0.9<--    PT/INR - ( 28 Jul 2020 06:02 )   PT: >40.00 sec;   INR: 3.83 ratio

## 2020-07-29 NOTE — PROGRESS NOTE ADULT - SUBJECTIVE AND OBJECTIVE BOX
GENIE ROSENBERG  80y Female    INTERVAL HPI/OVERNIGHT EVENTS:    Pt feels better today. Was playing cards in the morning.  Had episodes of tachycardia and sinus pause of 5.2 seconds overnight.  Cardizem and metoprolol now held and EP consulted.  Pt aware that she might need a pacemaker.     T(F): 98.3 (20 @ 13:00), Max: 98.3 (20 @ 13:00)  HR: 119 (20 @ 13:00) (57 - 120)  BP: 166/71 (20 @ 13:00) (147/90 - 195/77)  RR: 18 (20 @ 13:00) (18 - 20)  SpO2: 98% (20 @ 06:39) (97% - 98%) on 2L NC    I&O's Summary    2020 07:  -  2020 07:00  --------------------------------------------------------  IN: 120 mL / OUT: 850 mL / NET: -730 mL    2020 07:  -  2020 13:45  --------------------------------------------------------  IN: 210 mL / OUT: 0 mL / NET: 210 mL      Daily Weight in k.3 (2020 05:49)      PHYSICAL EXAM:  GENERAL: NAD  HEAD:  Normocephalic  EYES:  conjunctiva and sclera clear  ENMT: Moist mucous membranes  NECK: Supple, No JVD  NERVOUS SYSTEM:  Alert, awake, Good concentration  CHEST/LUNG: Clear to percussion bilaterally (decreased BS)  HEART: IRR  ABDOMEN: Soft, Nontender, Nondistended  EXTREMITIES:  No edema      Consultant(s) Notes Reviewed:  [x ] YES  [ ] NO  Care Discussed with Consultants/Other Providers [ x] YES  [ ] NO    MEDICATIONS  (STANDING):  aspirin  chewable 81 milliGRAM(s) Oral daily  atorvastatin 10 milliGRAM(s) Oral at bedtime  budesonide 160 MICROgram(s)/formoterol 4.5 MICROgram(s) Inhaler 2 Puff(s) Inhalation two times a day  dextrose 5%. 1000 milliLiter(s) (50 mL/Hr) IV Continuous <Continuous>  dextrose 50% Injectable 12.5 Gram(s) IV Push once  dextrose 50% Injectable 25 Gram(s) IV Push once  dextrose 50% Injectable 25 Gram(s) IV Push once  doxycycline hyclate Capsule 100 milliGRAM(s) Oral every 12 hours  enalapril 5 milliGRAM(s) Oral every 12 hours  hydrALAZINE 25 milliGRAM(s) Oral three times a day  pantoprazole    Tablet 40 milliGRAM(s) Oral before breakfast  predniSONE   Tablet 40 milliGRAM(s) Oral daily  tiotropium 18 MICROgram(s) Capsule 1 Capsule(s) Inhalation daily    MEDICATIONS  (PRN):  ALBUTerol    90 MICROgram(s) HFA Inhaler 2 Puff(s) Inhalation every 2 hours PRN Shortness of Breath and/or Wheezing  dextrose 40% Gel 15 Gram(s) Oral once PRN Blood Glucose LESS THAN 70 milliGRAM(s)/deciLiter  glucagon  Injectable 1 milliGRAM(s) IntraMuscular once PRN Glucose <70 milliGRAM(s)/deciLiter  zolpidem 5 milliGRAM(s) Oral at bedtime PRN Insomnia    Telemetry reviewed    LABS:                        11.7   9.44  )-----------( 305      ( 2020 05:57 )             37.1     07-    141  |  101  |  28<H>  ----------------------------<  87  4.5   |  31  |  0.8    Ca    8.4<L>      2020 05:57  Mg     2.2     07-29      PT/INR - ( 2020 11:04 )   PT: 22.60 sec;   INR: 1.97 ratio                     Case discussed with residents    Care discussed with pt and family

## 2020-07-29 NOTE — CONSULT NOTE ADULT - SUBJECTIVE AND OBJECTIVE BOX
Patient is a 80y old  Female who presents with a chief complaint of COPD exacerbation (29 Jul 2020 09:24)    HPI:    Patient is a 80 y.o female w/ hx of AFIB on dig/cardizem/warfarin, HTN, HLD, CAD, COPD, recently started on 2L supplemental  oxygen presents to the ED for evaluation of dyspnea and productive cough. Pt admits to baseline dyspnea, but started to worsen last week. She went to see her pulmonologist, Dr Guadarrama, this past Monday and was started on 2L supplemental Oxygen and Trilogy Ellipta.  Pt presented to the ED because her SOB had not improved despite the new additions to her regimen. She has also been complaining of productive cough with white phlegm for about a month. She is a previous smoker, 1PPD for 65yrs, quit one year ago, she lives alone at home and denies any sick contacts and states she has been compliant with her inhalers    Pt was admitted to [facility] for COPD exacerbation.  While on tele, she has been noted to have episodes of tachycardia in the 150-160 range and then periods of bradycardia to the 40's at night.  She has been off Digoxin and Cardizem was stopped last night.  She had several episodes of sinus arrest overnight, with the longest one being 5 seconds.  Pt is asymptomatic with the bradycardia and with the tachycardic episodes.  Pt was ordered for BIPAP since being in the hospital, but she hates it and refuses to wear it.  She denies history of sleep apnea and does not have BIPAP at home.    Pt is DNR.  Family is considering hospice.  Pt is unsure.    PAST MEDICAL & SURGICAL HISTORY:  HTN (hypertension)  COPD without exacerbation  Afib  History of appendectomy  Kidney cysts    PREVIOUS DIAGNOSTIC TESTING:      ECHO  FINDINGS:  < from: Transthoracic Echocardiogram (07.26.20 @ 11:24) >  Summary:   1. LV Ejection Fraction by Steve's Method with a biplane EF of 58 %.   2. Right atrial enlargement.   3. Mild to moderate mitral valve regurgitation.   4. Mitral annular calcification.   5. Thickening and calcification of the anterior and posterior mitral valve leaflets.   6. Mild-moderate tricuspid regurgitation.   7. Estimated pulmonary artery systolic pressure is 72.2 mmHg assuming a right atrial pressure of 15 mmHg, which is consistent with severe pulmonary hypertension.    MEDICATIONS  (STANDING):  aspirin  chewable 81 milliGRAM(s) Oral daily  atorvastatin 10 milliGRAM(s) Oral at bedtime  budesonide 160 MICROgram(s)/formoterol 4.5 MICROgram(s) Inhaler 2 Puff(s) Inhalation two times a day  dextrose 5%. 1000 milliLiter(s) (50 mL/Hr) IV Continuous <Continuous>  dextrose 50% Injectable 12.5 Gram(s) IV Push once  dextrose 50% Injectable 25 Gram(s) IV Push once  dextrose 50% Injectable 25 Gram(s) IV Push once  doxycycline hyclate Capsule 100 milliGRAM(s) Oral every 12 hours  enalapril 5 milliGRAM(s) Oral every 12 hours  hydrALAZINE 25 milliGRAM(s) Oral three times a day  pantoprazole    Tablet 40 milliGRAM(s) Oral before breakfast  predniSONE   Tablet 40 milliGRAM(s) Oral daily  tiotropium 18 MICROgram(s) Capsule 1 Capsule(s) Inhalation daily    MEDICATIONS  (PRN):  ALBUTerol    90 MICROgram(s) HFA Inhaler 2 Puff(s) Inhalation every 2 hours PRN Shortness of Breath and/or Wheezing  dextrose 40% Gel 15 Gram(s) Oral once PRN Blood Glucose LESS THAN 70 milliGRAM(s)/deciLiter  glucagon  Injectable 1 milliGRAM(s) IntraMuscular once PRN Glucose <70 milliGRAM(s)/deciLiter  zolpidem 5 milliGRAM(s) Oral at bedtime PRN Insomnia      FAMILY HISTORY:  FH: HTN (hypertension)      SOCIAL HISTORY:    CIGARETTES: previous smoker    ALCOHOL: denies    Past Surgical History:    Allergies:    No Known Allergies      REVIEW OF SYSTEMS:  10 point ROS is negative    Vital Signs Last 24 Hrs  T(C): 36.1 (29 Jul 2020 05:49), Max: 36.4 (28 Jul 2020 20:41)  T(F): 97 (29 Jul 2020 05:49), Max: 97.6 (28 Jul 2020 20:41)  HR: 66 (29 Jul 2020 05:49) (57 - 120)  BP: 165/76 (29 Jul 2020 05:49) (147/90 - 195/77)  BP(mean): --  RR: 18 (29 Jul 2020 05:49) (18 - 20)  SpO2: 98% (29 Jul 2020 06:39) (97% - 98%)    PHYSICAL EXAM:    GENERAL: In no apparent distress, well nourished, and hydrated.  HEART: irreg irreg, at 67 BPM  PULMONARY: Clear to auscultation and perfusion.  No rales, wheezing, or rhonchi bilaterally.  ABDOMEN: Soft, Nontender, Nondistended; Bowel sounds present  EXTREMITIES:  2+ Peripheral Pulses, No clubbing, cyanosis, or edema  NEUROLOGICAL: Grossly nonfocal    INTERPRETATION OF TELEMETRY:  Afib with RVR, Afib with bradycardia, multiple pauses - longest 5 seconds    ECG:  < from: 12 Lead ECG (07.29.20 @ 00:56) >  Ventricular Rate 63 BPM    Atrial Rate 63 BPM    P-R Interval 136 ms    QRS Duration 90 ms    Q-T Interval 380 ms    QTC Calculation(Bezet) 388 ms    P Axis 51 degrees    R Axis 85 degrees    T Axis 264 degrees    Diagnosis Line Normal sinus rhythm  Left ventricular hypertrophy with repolarization abnormality  Abnormal ECG    I&O's Detail          29 Jul 2020 07:01  -  29 Jul 2020 11:38  --------------------------------------------------------  IN:    Oral Fluid: 210 mL  Total IN: 210 mL    OUT:  Total OUT: 0 mL    Total NET: 210 mL        LABS:                        11.7   9.44  )-----------( 305      ( 29 Jul 2020 05:57 )             37.1     07-29    141  |  101  |  28<H>  ----------------------------<  87  4.5   |  31  |  0.8    Ca    8.4<L>      29 Jul 2020 05:57          PT/INR - ( 28 Jul 2020 06:02 )   PT: >40.00 sec;   INR: 3.83 ratio         RADIOLOGY & ADDITIONAL STUDIES:  < from: Xray Chest 1 View- PORTABLE-Routine (07.25.20 @ 10:03) >  Impression:    Improved but persistent bibasilar opacities/effusions.    < end of copied text >

## 2020-07-29 NOTE — PROGRESS NOTE ADULT - SUBJECTIVE AND OBJECTIVE BOX
HPI:  Patient is a 80 y.o female w/ hx of AFIB on dig/cardizem/warfarin, HTN, HLD, CAD, COPD, recently started on 2L supplemental  oxygen presents to the ED for evaluation of dyspnea and productive cough. Pt admits to baseline dyspnea, but started to worsen last week. She went to see her pulmonologist Dr thacker this past Monday and was started on 2L supplemental Oxygen and Trelegy Ellipta, and was advised to come to the hospital but she refused. Pt is presenting today because her SOB has not improved despite the new additions to her regimen. She has also been complaining of productive cough with white phlegm for about a month. She is a previous smoker, 1PPD for 65yrs, quit one year ago, she lives alone at home and denies any sick contact and states she has been compliant with her inhalers    In ED: pt saturating on 97% on RA, placed on BIPAP, Asymptomatic bradycardia 40-50BPM  ABG: Compensated respiratory Alkalosis  Chest X-ray: B/L opacities and Effusions (24 Jul 2020 16:46)    PAST MEDICAL & SURGICAL HISTORY:  HTN (hypertension)  COPD without exacerbation  Afib  History of appendectomy  Kidney cysts    FAMILY HISTORY:  FH: HTN (hypertension)  .  No cardiovascular or pulmonary family history     REVIEW OF SYSTEMS:  All ROS are negative exept per HPI     Allergies  No Known Allergies  Intolerances      PHYSICAL EXAM  Vital Signs Last 24 Hrs  T(C): 36.1 (29 Jul 2020 05:49), Max: 36.4 (28 Jul 2020 20:41)  T(F): 97 (29 Jul 2020 05:49), Max: 97.6 (28 Jul 2020 20:41)  HR: 66 (29 Jul 2020 05:49) (57 - 120)  BP: 165/76 (29 Jul 2020 05:49) (147/90 - 195/77)  BP(mean): --  RR: 18 (29 Jul 2020 05:49) (18 - 20)  SpO2: 98% (29 Jul 2020 06:39) (97% - 98%)    CONSTITUTIONAL:  NAD    ENT:   Airway patent,   No thrush    EYES:   Clear bilaterally,   pupils equal,   round and reactive to light.    CARDIAC:   Normal rate,   irregular rhythm.  no edema    RESPIRATORY:  No wheezing  b/l basilar minimal inspiratory crackles  Decreased chest expansion  Decreased air flow  Not tachypneic,  No use of accessory muscles    GASTROINTESTINAL:  Abdomen soft, non-tender,   No guarding,   Positive BS    MUSCULOSKELETAL:   Range of motion is not limited,  Mild clubbing, No cyanosis    NEUROLOGICAL:   Alert and oriented   No motor deficits.    SKIN:   Skin normal color for race,   No evidence of rash.      LABS:                          11.7   9.44  )-----------( 305      ( 29 Jul 2020 05:57 )             37.1     07-29    141  |  101  |  28<H>  ----------------------------<  87  4.5   |  31  |  0.8    Ca    8.4<L>      29 Jul 2020 05:57      MEDICATIONS  (STANDING):  aspirin  chewable 81 milliGRAM(s) Oral daily  atorvastatin 10 milliGRAM(s) Oral at bedtime  budesonide 160 MICROgram(s)/formoterol 4.5 MICROgram(s) Inhaler 2 Puff(s) Inhalation two times a day  dextrose 5%. 1000 milliLiter(s) (50 mL/Hr) IV Continuous <Continuous>  dextrose 50% Injectable 12.5 Gram(s) IV Push once  dextrose 50% Injectable 25 Gram(s) IV Push once  dextrose 50% Injectable 25 Gram(s) IV Push once  doxycycline hyclate Capsule 100 milliGRAM(s) Oral every 12 hours  enalapril 5 milliGRAM(s) Oral every 12 hours  hydrALAZINE 25 milliGRAM(s) Oral three times a day  pantoprazole    Tablet 40 milliGRAM(s) Oral before breakfast  predniSONE   Tablet 40 milliGRAM(s) Oral daily  tiotropium 18 MICROgram(s) Capsule 1 Capsule(s) Inhalation daily    MEDICATIONS  (PRN):  ALBUTerol    90 MICROgram(s) HFA Inhaler 2 Puff(s) Inhalation every 2 hours PRN Shortness of Breath and/or Wheezing  dextrose 40% Gel 15 Gram(s) Oral once PRN Blood Glucose LESS THAN 70 milliGRAM(s)/deciLiter  glucagon  Injectable 1 milliGRAM(s) IntraMuscular once PRN Glucose <70 milliGRAM(s)/deciLiter  zolpidem 5 milliGRAM(s) Oral at bedtime PRN Insomnia      < from: Xray Chest 1 View- PORTABLE-Routine (07.25.20 @ 10:03) >  Impression:  Improved but persistent bibasilar opacities/effusions.  < end of copied text >

## 2020-07-29 NOTE — CONSULT NOTE ADULT - ATTENDING COMMENTS
TachyBrady syndrome  sinus pauses > 3 seconds  no reversible causes  betablockers needed for AF, cannot be used due to Bd  We discussed the risks/benefits/alternatives, nature of procedure, and follow up care after device is implanted. We also discussed remote monitoring in details. Patient is in agreement with proceeding with the device implant. We discussed the risks of bleeding, hematoma, injury to vessels and heart, perforation, tamponade, pneumothorax, infection, lead dislodgment, device malfunction, and rare risks of stroke/heart attack/death. Patient expressed understanding of the discussion. I answered all the questions in details.
agree with above

## 2020-07-29 NOTE — PROGRESS NOTE ADULT - ASSESSMENT
Impression:  Acute Hypoxic Respiratory Failure secondary to COPD exacerbation, resolved  Chronic Respiratory failure secondary to severe COPD with emphysematous changes, on home O2  AFib on Coumadin  3.5 second pause; Tachy-Jose Syndrome?    Plan:  Prednisone slow taper  c/w Symbicort bid  Tiotropium 18mcg qd  BiPAP 4h on/off as tolerated & HS  c/w supplemental O2  Duoneb q6h prn  EP eval  Antihypertensive control  Maximize cardiac therapy and volume status  OP Pulm f/u

## 2020-07-29 NOTE — PROGRESS NOTE ADULT - ASSESSMENT
Patient is a 80 y.o female w/ hx of AFIB on dig/ Cardizem /warfarin, HTN, HLD, CAD, COPD, recently started on 2L supplemental  oxygen presents to the ED for evaluation of dyspnea and productive cough.     Acute COPD Exacerbation  - Presented with worsening SOB and productive cough, desatturation to 91% on RA, states she is compliant with medication  - Never been intubated, former smoker 65 pack year hx, last COPD exacerbation was Sept 2019  - VBG with respiratory alkalosis, compensated   - Pulm recs appreciated:  d/c Solumedrol, started Prednisone. C/w inhalers, duoneb and BiPAP 4hrs on// 4hrs off (patient not tolerating BiPAP)  - C/w Doxycycline to complete 5 days   - CXR (7/25) improved from previous    AFIB:  Presenting with Junctional Bradycardia now with RVR  - Takes Digoxin and Cardizem for rate control (Dig Level is therapeutic)  - INR 3.8, continue to hold Coumadin  - Holding digoxin  - Pt had 3.5 sec pause on tele overnight  - On Cardizem 180 qd    Essential HTN  - On hydralazine 25mg TID  - On Vasotec 5mg qu  - On Cardizem    CAD + HLD  - Echo (7/26): EF 58%, severe pulm HTN  - ECHO 2019: EF: 50-55%, Mild- mod mitral valve regurgitation  - Stress test from 2019: unremarkable  - C/w ASA and Statin     Misc  # Diet: DASH/TLC  # DVT Prophylaxis: warfarin  # GI Prophylaxis: protonix  # Activity: IAT  # Dispo: pending rate control of afib and better control of BP    Plan of care discussed with pt and daughter at bedside today. Daughter is interested in hospice care at home - consult placed. Discussed hospice with patient and questions answered. Plan for now is either home with services vs home with hospice (depending on pt's choice). Pt is adamantly refusing SNF.    # Code Status: Full code Patient is a 80 y.o female w/ hx of AFIB on dig/ Cardizem /warfarin, HTN, HLD, CAD, COPD, recently started on 2L supplemental  oxygen presents to the ED for evaluation of dyspnea and productive cough.     Acute COPD Exacerbation  - Presented with worsening SOB and productive cough, desatturation to 91% on RA, states she is compliant with medication  - Never been intubated, former smoker 65 pack year hx, last COPD exacerbation was Sept 2019  - VBG with respiratory alkalosis, compensated   - Pulm recs appreciated:  d/c Solumedrol, started Prednisone. C/w inhalers, duoneb and BiPAP 4hrs on// 4hrs off (patient not tolerating BiPAP)  - C/w Doxycycline to complete 5 days   - CXR (7/25) improved from previous    AFIB:  Presenting with Junctional Bradycardia now with RVR  - Takes Digoxin and Cardizem for rate control (Dig Level is therapeutic)  - INR 3.8, continue to hold Coumadin  - Holding digoxin  - Pt had 3.5 sec pause on tele overnight, Cardizem and Metoprolol discontinued  - EP consulted for Tachy-Jose syndrome and consideration of pacemaker    Essential HTN  - On hydralazine 25mg TID  - Vasotec 5mg increased to BID from qd  - Ky consider adding Nifedipine is BPs still not controlled    CAD + HLD  - Echo (7/26): EF 58%, severe pulm HTN  - ECHO 2019: EF: 50-55%, Mild- mod mitral valve regurgitation  - Stress test from 2019: unremarkable  - C/w ASA and Statin     Misc  # Diet: DASH/TLC  # DVT Prophylaxis: warfarin  # GI Prophylaxis: protonix  # Activity: IAT  # Dispo: pending rate control of afib and better control of BP    Plan of care discussed with pt and daughter at bedside today. Daughter is interested in hospice care at home - consult placed. Discussed hospice with patient and questions answered. Plan for now is either home with services vs home with hospice (depending on pt's choice). Pt is adamantly refusing SNF.    # Code Status: Full code

## 2020-07-29 NOTE — CONSULT NOTE ADULT - ASSESSMENT
80 y.o female w/ hx of AFIB on dig/cardizem/warfarin, HTN, HLD, CAD, COPD, recently started on 2L supplemental  oxygen presents to the ED for evaluation of dyspnea and productive cough.  She was admitted to tele for COPD exacerbation.  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  She was admitted to tele for COPD exacerbation.  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

## 2020-07-29 NOTE — PROGRESS NOTE ADULT - ASSESSMENT
81 y/o woman with PMH of Afib on coumadin, HTN, hyperlipidemia, CAD, COPD and recently started on 2L supplemental oxygen presented to the ED for dyspnea and productive cough. Pt admits to baseline dyspnea, but it started to worsen last week. Hospital course complicated by junctional bradycardia and now Afib with RVR at times and HTN.    1. COPD exacerbation/chronic hypoxemic respiratory failure/ severe Pulm HTN (likely group 3)  improving on current therapy  Pulm f/u appreciated  slow steroid taper, nebs, symbicort  pt unable to use Spiriva  doxycycline x total of 5 days (last dose tonight)  pt not using bipap   outpt Pulm f/u    2. Afib with junctional bradycardia and RVR  now with sinus pause of 5.2 seconds  concerning for tachy/laura syndrome - pt may need pacemaker  metoprolol and cardizem stopped  EP consult in progress  continue telemetry  keep off digoxin   continue coumadin - goal INR 2-3    3. HTN - uncontrolled - pt on hydralazine and enalapril  enalapril increased to 5mg q12hr  change hydralazine to procardia XL 30mg daily if still uncontrolled    4. CAD/hyperlipidemia - on medical therapy    5. DVT prophylaxis on coumadin    6. NSVT - continue telemetry          PROGRESS NOTE HANDOFF    Pending: EP eval, better BP control    Disposition: from home    Family discussion: will speak to daughter in law re: plan of care per pt request today

## 2020-07-30 NOTE — PROGRESS NOTE ADULT - ASSESSMENT
Impression:  Acute Hypoxic Respiratory Failure secondary to COPD exacerbation, resolved  Chronic Respiratory failure secondary to severe COPD with emphysematous changes, on home O2  AFib on Coumadin  3.5 second pause; Tachy-Jose Syndrome?    Plan:  Prednisone slow taper  c/w Symbicort bid  Can switch tiotropium to ipratropium nebz  BiPAP 4h on/off as tolerated & HS  c/w supplemental O2  Pulse ox on ambulation  Duoneb q6h prn  EP eval appreciated, pacemaker tomorrow  Antihypertensive control  Maximize cardiac therapy and volume status  No Abx from Pulmonary standpoint  Subtherapeutic INR, adjust Coumadin  DVT ppx  OP Pulm f/u

## 2020-07-30 NOTE — DIETITIAN INITIAL EVALUATION ADULT. - RD TO REMAIN AVAILABLE
RD to monitor diet order, energy intake, body composition, NFPF. Goal: In 7 days pt. to continue to consume % PO at meals/yes

## 2020-07-30 NOTE — PROGRESS NOTE ADULT - SUBJECTIVE AND OBJECTIVE BOX
HPI:  Patient is a 80 y.o female w/ hx of AFIB on dig/cardizem/warfarin, HTN, HLD, CAD, COPD, recently started on 2L supplemental  oxygen presents to the ED for evaluation of dyspnea and productive cough. Pt admits to baseline dyspnea, but started to worsen last week. She went to see her pulmonologist Dr thacker this past Monday and was started on 2L supplemental Oxygen and Trelegy Ellipta, and was advised to come to the hospital but she refused. Pt is presenting today because her SOB has not improved despite the new additions to her regimen. She has also been complaining of productive cough with white phlegm for about a month. She is a previous smoker, 1PPD for 65yrs, quit one year ago, she lives alone at home and denies any sick contact and states she has been compliant with her inhalers    In ED: pt saturating on 97% on RA, placed on BIPAP, Asymptomatic bradycardia 40-50BPM  ABG: Compensated respiratory Alkalosis  Chest X-ray: B/L opacities and Effusions (24 Jul 2020 16:46)    PAST MEDICAL & SURGICAL HISTORY:  HTN (hypertension)  COPD without exacerbation  Afib  History of appendectomy  Kidney cysts    FAMILY HISTORY:  FH: HTN (hypertension)  .  No cardiovascular or pulmonary family history     REVIEW OF SYSTEMS:  All ROS are negative exept per HPI     Allergies  No Known Allergies  Intolerances    PHYSICAL EXAM  Vital Signs Last 24 Hrs  T(C): 35.8 (30 Jul 2020 05:58), Max: 36.9 (29 Jul 2020 20:15)  T(F): 96.4 (30 Jul 2020 05:58), Max: 98.4 (29 Jul 2020 20:15)  HR: 81 (30 Jul 2020 05:58) (81 - 126)  BP: 125/67 (30 Jul 2020 05:58) (125/67 - 190/83)  BP(mean): --  RR: 18 (30 Jul 2020 05:58) (18 - 18)  SpO2: --    CONSTITUTIONAL:  NAD    ENT:   Airway patent,   No thrush    EYES:   Clear bilaterally,   pupils equal,   round and reactive to light.    CARDIAC:   Normal rate,   irregular rhythm.  no edema    RESPIRATORY:  No wheezing  Decreased chest expansion  Decreased air flow  Not tachypneic,  No use of accessory muscles    GASTROINTESTINAL:  Abdomen soft, non-tender,   No guarding,   Positive BS    MUSCULOSKELETAL:   Range of motion is not limited,  Mild clubbing, No cyanosis    NEUROLOGICAL:   Alert and oriented   No motor deficits.    SKIN:   Skin normal color for race,   No evidence of rash.      LABS:                          12.1   10.18 )-----------( 321      ( 30 Jul 2020 05:39 )             38.6     07-30    138  |  100  |  28<H>  ----------------------------<  85  4.5   |  30  |  0.8    Ca    9.0      30 Jul 2020 05:39  Phos  3.9     07-30  Mg     2.2     07-30    PT/INR - ( 30 Jul 2020 05:39 )   PT: 15.60 sec;   INR: 1.36 ratio        MEDICATIONS  (STANDING):  aspirin  chewable 81 milliGRAM(s) Oral daily  atorvastatin 10 milliGRAM(s) Oral at bedtime  budesonide 160 MICROgram(s)/formoterol 4.5 MICROgram(s) Inhaler 2 Puff(s) Inhalation two times a day  dextrose 5%. 1000 milliLiter(s) (50 mL/Hr) IV Continuous <Continuous>  dextrose 50% Injectable 12.5 Gram(s) IV Push once  dextrose 50% Injectable 25 Gram(s) IV Push once  dextrose 50% Injectable 25 Gram(s) IV Push once  doxycycline hyclate Capsule 100 milliGRAM(s) Oral every 12 hours  enalapril 5 milliGRAM(s) Oral every 12 hours  hydrALAZINE 25 milliGRAM(s) Oral three times a day  pantoprazole    Tablet 40 milliGRAM(s) Oral before breakfast  predniSONE   Tablet 40 milliGRAM(s) Oral daily  tiotropium 18 MICROgram(s) Capsule 1 Capsule(s) Inhalation daily  warfarin 5 milliGRAM(s) Oral once    MEDICATIONS  (PRN):  ALBUTerol    90 MICROgram(s) HFA Inhaler 2 Puff(s) Inhalation every 2 hours PRN Shortness of Breath and/or Wheezing  dextrose 40% Gel 15 Gram(s) Oral once PRN Blood Glucose LESS THAN 70 milliGRAM(s)/deciLiter  glucagon  Injectable 1 milliGRAM(s) IntraMuscular once PRN Glucose <70 milliGRAM(s)/deciLiter  zolpidem 5 milliGRAM(s) Oral at bedtime PRN Insomnia

## 2020-07-30 NOTE — PROGRESS NOTE ADULT - ASSESSMENT
80 y.o female w/ hx of AFIB on dig/cardizem/warfarin, HTN, HLD, CAD, COPD, recently started on 2L supplemental  oxygen presents to the ED for evaluation of dyspnea and productive cough.  She was admitted to tele for COPD exacerbation.  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

## 2020-07-30 NOTE — PROGRESS NOTE ADULT - ASSESSMENT
79 y/o woman with PMH of Afib on coumadin, HTN, hyperlipidemia, CAD, COPD and recently started on 2L supplemental oxygen presented to the ED for dyspnea and productive cough. Pt admits to baseline dyspnea, but it started to worsen last week. Hospital course complicated by junctional bradycardia and now Afib with RVR at times and HTN.    1. COPD exacerbation/chronic hypoxemic respiratory failure/ severe Pulm HTN (likely group 3)  improved on current therapy  Pulm f/u appreciated  slow steroid taper, nebs, symbicort  pt unable to use Spiriva  s/p doxycycline  pt not using bipap   outpt Pulm f/u    2. Afib with junctional bradycardia and RVR  now with sinus pause of 5.2 seconds  tachy/laura syndrome   metoprolol, digoxin and cardizem stopped  EP consult appreciated  pacemaker placement on 7/31  NPO after midnight  continue telemetry  continue coumadin - goal INR 2-3    3. HTN - now better controlled on hydralazine and enalapril    4. CAD/hyperlipidemia - on medical therapy    5. DVT prophylaxis on coumadin    6. NSVT - continue telemetry          PROGRESS NOTE HANDOFF    Pending: Pacemaker placement 7/31    Disposition: from home

## 2020-07-30 NOTE — PROGRESS NOTE ADULT - SUBJECTIVE AND OBJECTIVE BOX
INTERVAL HPI/OVERNIGHT EVENTS:  Overnight on tele, pt with multiple episodes of tachycardia.  No pause overnight.  Pt has not complaints.  She says she slept well.    MEDICATIONS  (STANDING):  aspirin  chewable 81 milliGRAM(s) Oral daily  atorvastatin 10 milliGRAM(s) Oral at bedtime  budesonide 160 MICROgram(s)/formoterol 4.5 MICROgram(s) Inhaler 2 Puff(s) Inhalation two times a day  dextrose 5%. 1000 milliLiter(s) (50 mL/Hr) IV Continuous <Continuous>  dextrose 50% Injectable 12.5 Gram(s) IV Push once  dextrose 50% Injectable 25 Gram(s) IV Push once  dextrose 50% Injectable 25 Gram(s) IV Push once  doxycycline hyclate Capsule 100 milliGRAM(s) Oral every 12 hours  enalapril 5 milliGRAM(s) Oral every 12 hours  hydrALAZINE 25 milliGRAM(s) Oral three times a day  pantoprazole    Tablet 40 milliGRAM(s) Oral before breakfast  predniSONE   Tablet 40 milliGRAM(s) Oral daily  tiotropium 18 MICROgram(s) Capsule 1 Capsule(s) Inhalation daily  warfarin 5 milliGRAM(s) Oral once    MEDICATIONS  (PRN):  ALBUTerol    90 MICROgram(s) HFA Inhaler 2 Puff(s) Inhalation every 2 hours PRN Shortness of Breath and/or Wheezing  dextrose 40% Gel 15 Gram(s) Oral once PRN Blood Glucose LESS THAN 70 milliGRAM(s)/deciLiter  glucagon  Injectable 1 milliGRAM(s) IntraMuscular once PRN Glucose <70 milliGRAM(s)/deciLiter  zolpidem 5 milliGRAM(s) Oral at bedtime PRN Insomnia      Allergies    No Known Allergies    REVIEW OF SYSTEMS  10 point ROS is negative    Vital Signs Last 24 Hrs  T(C): 35.8 (30 Jul 2020 05:58), Max: 36.9 (29 Jul 2020 20:15)  T(F): 96.4 (30 Jul 2020 05:58), Max: 98.4 (29 Jul 2020 20:15)  HR: 81 (30 Jul 2020 05:58) (81 - 126)  BP: 125/67 (30 Jul 2020 05:58) (125/67 - 190/83)  BP(mean): --  RR: 18 (30 Jul 2020 05:58) (18 - 18)  SpO2: --    07-29-20 @ 07:01  -  07-30-20 @ 07:00  --------------------------------------------------------  IN: 570 mL / OUT: 850 mL / NET: -280 mL    Physical Exam    GENERAL: In no apparent distress, well nourished, and hydrated.  HEART: irreg, irreg.  HR currently 82 BPM  PULMONARY: Clear to auscultation  ABDOMEN: Soft, Nontender, Nondistended; Bowel sounds present  EXTREMITIES:  2+ Peripheral Pulses, No clubbing, cyanosis, or edema  NEUROLOGICAL: Grossly nonfocal    LABS:                        12.1   10.18 )-----------( 321      ( 30 Jul 2020 05:39 )             38.6     07-30    138  |  100  |  28<H>  ----------------------------<  85  4.5   |  30  |  0.8    Ca    9.0      30 Jul 2020 05:39  Phos  3.9     07-30  Mg     2.2     07-30    PT/INR - ( 30 Jul 2020 05:39 )   PT: 15.60 sec;   INR: 1.36 ratio      COVID negative 7/29/2020

## 2020-07-30 NOTE — PROGRESS NOTE ADULT - SUBJECTIVE AND OBJECTIVE BOX
GENIE ROSENBERG  80y Female    INTERVAL HPI/OVERNIGHT EVENTS:    Pt feels weak but otherwise OK.  episodes of tachycardia and then pause of 2 seconds.  No pain. Breathing stable.    T(F): 96.4 (20 @ 05:58), Max: 98.4 (20 @ 20:15)  HR: 81 (20 @ 05:58) (81 - 126)  BP: 125/67 (20 @ 05:58) (125/67 - 190/83)  RR: 18 (20 @ 05:58) (18 - 18)  SpO2: 96% on 2L NC    I&O's Summary    2020 07:  -  2020 07:00  --------------------------------------------------------  IN: 570 mL / OUT: 850 mL / NET: -280 mL    2020 07:01  -  2020 11:15  --------------------------------------------------------  IN: 210 mL / OUT: 0 mL / NET: 210 mL      Daily Weight in k.2 (2020 05:58)      PHYSICAL EXAM:  GENERAL: NAD  HEAD:  Normocephalic  EYES:  conjunctiva and sclera clear  ENMT: Moist mucous membranes  NECK: Supple, No JVD  NERVOUS SYSTEM:  Alert, awake, Good concentration  CHEST/LUNG: Clear to percussion bilaterally (decreased BS)  HEART: IRR  ABDOMEN: Soft, Nontender, Nondistended  EXTREMITIES:   No edema      Consultant(s) Notes Reviewed:  [x ] YES  [ ] NO  Care Discussed with Consultants/Other Providers [ x] YES  [ ] NO    MEDICATIONS  (STANDING):  aspirin  chewable 81 milliGRAM(s) Oral daily  atorvastatin 10 milliGRAM(s) Oral at bedtime  budesonide 160 MICROgram(s)/formoterol 4.5 MICROgram(s) Inhaler 2 Puff(s) Inhalation two times a day  dextrose 5%. 1000 milliLiter(s) (50 mL/Hr) IV Continuous <Continuous>  dextrose 50% Injectable 12.5 Gram(s) IV Push once  dextrose 50% Injectable 25 Gram(s) IV Push once  dextrose 50% Injectable 25 Gram(s) IV Push once  enalapril 5 milliGRAM(s) Oral every 12 hours  hydrALAZINE 25 milliGRAM(s) Oral three times a day  pantoprazole    Tablet 40 milliGRAM(s) Oral before breakfast  predniSONE   Tablet 40 milliGRAM(s) Oral daily  tiotropium 18 MICROgram(s) Capsule 1 Capsule(s) Inhalation daily  warfarin 5 milliGRAM(s) Oral once    MEDICATIONS  (PRN):  ALBUTerol    90 MICROgram(s) HFA Inhaler 2 Puff(s) Inhalation every 2 hours PRN Shortness of Breath and/or Wheezing  dextrose 40% Gel 15 Gram(s) Oral once PRN Blood Glucose LESS THAN 70 milliGRAM(s)/deciLiter  glucagon  Injectable 1 milliGRAM(s) IntraMuscular once PRN Glucose <70 milliGRAM(s)/deciLiter  zolpidem 5 milliGRAM(s) Oral at bedtime PRN Insomnia    Telemetry reviewed    LABS:                        12.1   1018 )-----------( 321      ( 2020 05:39 )             38.6     07-30    138  |  100  |  28<H>  ----------------------------<  85  4.5   |  30  |  0.8    Ca    9.0      2020 05:39  Phos  3.9     07-30  Mg     2.2     07-30      PT/INR - ( 2020 05:39 )   PT: 15.60 sec;   INR: 1.36 ratio                       Case discussed with residents    Care discussed with pt

## 2020-07-30 NOTE — PROGRESS NOTE ADULT - ASSESSMENT
Patient is a 80 y.o female w/ hx of AFIB on dig/ Cardizem /warfarin, HTN, HLD, CAD, COPD, recently started on 2L supplemental  oxygen presents to the ED for evaluation of dyspnea and productive cough.     Acute COPD Exacerbation  - Presented with worsening SOB and productive cough, desatturation to 91% on RA, states she is compliant with medication  - Never been intubated, former smoker 65 pack year hx, last COPD exacerbation was Sept 2019  - VBG with respiratory alkalosis, compensated   - Pulm recs appreciated:  d/c Solumedrol, started Prednisone. C/w inhalers, duoneb and BiPAP 4hrs on// 4hrs off (patient not tolerating BiPAP)  - C/w Doxycycline to complete 5 days   - CXR (7/25) improved from previous    AFIB:  Presenting with Junctional Bradycardia now with RVR  - Takes Digoxin and Cardizem at home for rate control  - INR 1.3, continue to hold Coumadin?  - Holding digoxin  - EP consulted: plan for pacemaker tomorrow    Essential HTN  - On hydralazine 25mg TID  - Vasotec 5mg BID qd  - Will consider adding Nifedipine is BPs still not controlled    CAD + HLD  - Echo (7/26): EF 58%, severe pulm HTN  - ECHO 2019: EF: 50-55%, Mild- mod mitral valve regurgitation  - Stress test from 2019: unremarkable  - C/w ASA and Statin     Misc  # Diet: DASH/TLC  # DVT Prophylaxis: warfarin  # GI Prophylaxis: protonix  # Activity: IAT  # Dispo: pending rate control of afib and better control of BP    Plan of care discussed with pt and daughter, no longer pursuing hospice. Will be d/c home with home care.    # Code Status: Full code Patient is a 80 y.o female w/ hx of AFIB on dig/ Cardizem /warfarin, HTN, HLD, CAD, COPD, recently started on 2L supplemental  oxygen presents to the ED for evaluation of dyspnea and productive cough.     Acute COPD Exacerbation  - Presented with worsening SOB and productive cough, desatturation to 91% on RA, states she is compliant with medication  - Never been intubated, former smoker 65 pack year hx, last COPD exacerbation was Sept 2019  - VBG with respiratory alkalosis, compensated   - Pulm recs appreciated:  d/c Solumedrol, started Prednisone. C/w inhalers, duoneb and BiPAP 4hrs on// 4hrs off (patient not tolerating BiPAP)  - C/w Doxycycline to complete 5 days   - CXR (7/25) improved from previous    AFIB:  Presenting with Junctional Bradycardia now with RVR  - Takes Digoxin and Cardizem at home for rate control  - INR 1.3, continue to hold Coumadin?  - Holding digoxin  - EP consulted: plan for pacemaker tomorrow    Essential HTN  - On hydralazine 25mg TID  - Vasotec 5mg BID qd  - Will consider adding Nifedipine is BPs still not controlled    CAD + HLD  - Echo (7/26): EF 58%, severe pulm HTN  - ECHO 2019: EF: 50-55%, Mild- mod mitral valve regurgitation  - Stress test from 2019: unremarkable  - C/w ASA and Statin     Misc  # Diet: DASH/TLC  # DVT Prophylaxis: warfarin  # GI Prophylaxis: protonix  # Activity: IAT  # Dispo: pending rate control of afib and better control of BP    Plan of care discussed with pt and daughter, no longer pursuing hospice. Will be d/c home with home care.    Letter of Medical Necessity for Hospital Bed for Cardiopulmonary Reasons  Patient is a 80 y.o female with history of Afib, HTN, HLD, CAD, COPD, recently started on 2L supplemental  oxygen who was admitted for Acute on Chronic CHF exacerbation. Due to this condition, patient requires a bed that will enable her to lift the head of the bed more than thirty degrees most of the time due to SOB while lying supine. Patient also requires frequent positioning of the body in ways not feasible with an ordinary bed due to decubitus prophylaxis. Pillows and wedges have been considered and ruled out.   Gel Overlay mattress - Patient will need a gel overlay to help with healing of current skin breakdown and to prevent further skin breakdown. It is medically necessary for patient to receive gel overlay due to her medical diagnosis.    # Code Status: Full code

## 2020-07-30 NOTE — PHARMACOTHERAPY INTERVENTION NOTE - COMMENTS
Med Rec completed with patient and daughter in law.  They report the dose of warfarin has been fluctuating.  Recently Trelegy was started.    Assessed patients DPI technique.  Patient reports she is unable to take a deep forceful breath and feels like the medication is not working . Patient has poor inspiratory volume upon demonstration.    Recommended valved holding chamber for Symbicort to improve coordination.  Respiratory therapist contacted and will provide today.    Contacted Dr. Pal as patient is to be started on Spiriva today.  If Symbicort + Sprivia to be continued at discharge recommended spiriva Respimat be sent for ease of administration
Coumadin clinic contacted for Warfarin dosing.  Dr. Pal informed of home dose (incorrect in med rec).     Outpatient medication listed updated at request of Dr. Pal    Recommended DVT prophylaxis as INR is subtherapeutic

## 2020-07-30 NOTE — DIETITIAN INITIAL EVALUATION ADULT. - OTHER INFO
P/w: COPD exacerbation. Steroid taper. Pulm following, improving.  Afib with junctional bradycardia and RVR: pending pacemaker placement. HTN: better controlled.

## 2020-07-30 NOTE — DIETITIAN INITIAL EVALUATION ADULT. - DIET TYPE
Pt. reports good appetite, regular meals and snacks PTP, low Na diet. NKFA. Takes vit D supplement. Used to drink Ensure shakes, but not anymore as appetite is good. Stable weight trends, UBW ~145lbs. Pt. noted hyperkalemic earlier during LOS. Provided no conc K diet ed and hand outs.

## 2020-07-30 NOTE — PROGRESS NOTE ADULT - SUBJECTIVE AND OBJECTIVE BOX
Patient is a 80y old Female who presents with a chief complaint of COPD exacerbation (30 Jul 2020 07:09)    No acute events overnight. Patient has no complaints this morning. Patient tachycardic overnight. Denies chest pain, SOB, N/V/D,      Allergies  No Known Allergies      PAST MEDICAL & SURGICAL HISTORY  HTN (hypertension)  COPD without exacerbation  Afib  History of appendectomy  Kidney cysts      PHYSICAL EXAM  Vital Signs Last 24 Hrs  T(C): 35.8 (30 Jul 2020 05:58), Max: 36.9 (29 Jul 2020 20:15)  T(F): 96.4 (30 Jul 2020 05:58), Max: 98.4 (29 Jul 2020 20:15)  HR: 81 (30 Jul 2020 05:58) (81 - 126)  BP: 125/67 (30 Jul 2020 05:58) (125/67 - 190/83)  BP(mean): --  RR: 18 (30 Jul 2020 05:58) (18 - 18)  SpO2: --    I&O's Summary    29 Jul 2020 07:01  -  30 Jul 2020 07:00  --------------------------------------------------------  IN: 570 mL / OUT: 850 mL / NET: -280 mL      GENERAL: No acute distress, well-developed  HEAD:  Atraumatic, Normocephalic  CHEST/LUNG: Clear to auscultation bilaterally; No wheeze, equal breath sounds bilaterally, respirations nonlabored  HEART: Regular rate and rhythm; No murmurs, rubs, or gallops  ABDOMEN: Soft, nontender, nondistended; Bowel sounds present, no organomegaly  EXTREMITIES:  No clubbing, cyanosis, or edema  PSYCH: Nl behavior, nl affect  NEUROLOGY: AAOx3, non-focal, moves all extremities spontaneously  SKIN: Normal color, No rashes or lesions      LABS                        12.1   10.18 )-----------( 321      ( 30 Jul 2020 05:39 )             38.6     Hemoglobin: 12.1 g/dL (07-30 @ 05:39)  Hemoglobin: 11.7 g/dL (07-29 @ 05:57)  Hemoglobin: 11.9 g/dL (07-28 @ 06:02)  Hemoglobin: 12.0 g/dL (07-27 @ 06:03)  Hemoglobin: 11.7 g/dL (07-26 @ 05:24)    CBC Full  -  ( 30 Jul 2020 05:39 )  WBC Count : 10.18 K/uL  RBC Count : 4.33 M/uL  Hemoglobin : 12.1 g/dL  Hematocrit : 38.6 %  Platelet Count - Automated : 321 K/uL  Mean Cell Volume : 89.1 fL  Mean Cell Hemoglobin : 27.9 pg  Mean Cell Hemoglobin Concentration : 31.3 g/dL  Auto Neutrophil # : 6.96 K/uL  Auto Lymphocyte # : 1.78 K/uL  Auto Monocyte # : 1.04 K/uL  Auto Eosinophil # : 0.23 K/uL  Auto Basophil # : 0.02 K/uL  Auto Neutrophil % : 68.3 %  Auto Lymphocyte % : 17.5 %  Auto Monocyte % : 10.2 %  Auto Eosinophil % : 2.3 %  Auto Basophil % : 0.2 %    07-30    138  |  100  |  28<H>  ----------------------------<  85  4.5   |  30  |  0.8    Ca    9.0      30 Jul 2020 05:39  Phos  3.9     07-30  Mg     2.2     07-30      Creatinine Trend: 0.8<--, 0.8<--, 0.8<--, 0.7<--, 1.0<--, 1.2<--    PT/INR - ( 30 Jul 2020 05:39 )   PT: 15.60 sec;   INR: 1.36 ratio

## 2020-07-31 NOTE — PROGRESS NOTE ADULT - SUBJECTIVE AND OBJECTIVE BOX
Patient is a 80y old Female who presents with a chief complaint of COPD exacerbation (2020 07:16)    No acute events overnight. Patient has no complaints this morning. Denies chest pain, SOB, N/V/D,      Allergies  No Known Allergies      PAST MEDICAL & SURGICAL HISTORY  HTN (hypertension)  COPD without exacerbation  Afib  History of appendectomy  Kidney cysts      PHYSICAL EXAM  Vital Signs Last 24 Hrs  T(C): 36.4 (2020 07:28), Max: 36.6 (2020 21:22)  T(F): 97.6 (2020 05:13), Max: 97.9 (2020 21:22)  HR: 117 (2020 07:28) (70 - 123)  BP: 176/95 (2020 07:28) (164/64 - 176/95)  BP(mean): --  RR: 18 (2020 07:28) (18 - 18)  SpO2: --    I&O's Summary    2020 07:01  -  2020 07:00  --------------------------------------------------------  IN: 570 mL / OUT: 300 mL / NET: 270 mL      GENERAL: No acute distress, well-developed  HEAD:  Atraumatic, Normocephalic  CHEST/LUNG: Clear to auscultation bilaterally; No wheeze, equal breath sounds bilaterally, respirations nonlabored  HEART: Regular rate and rhythm; No murmurs, rubs, or gallops  ABDOMEN: Soft, nontender, nondistended  PSYCH: Nl behavior, nl affect  NEUROLOGY: AAOx3, non-focal, moves all extremities spontaneously      LABS                        12.1   10.18 )-----------( 321      ( 2020 05:39 )             38.6     Hemoglobin: 12.1 g/dL ( @ 05:39)  Hemoglobin: 11.7 g/dL ( @ 05:57)  Hemoglobin: 11.9 g/dL ( @ 06:02)  Hemoglobin: 12.0 g/dL ( @ 06:03)    CBC Full  -  ( 2020 05:39 )  WBC Count : 10.18 K/uL  RBC Count : 4.33 M/uL  Hemoglobin : 12.1 g/dL  Hematocrit : 38.6 %  Platelet Count - Automated : 321 K/uL  Mean Cell Volume : 89.1 fL  Mean Cell Hemoglobin : 27.9 pg  Mean Cell Hemoglobin Concentration : 31.3 g/dL  Auto Neutrophil # : 6.96 K/uL  Auto Lymphocyte # : 1.78 K/uL  Auto Monocyte # : 1.04 K/uL  Auto Eosinophil # : 0.23 K/uL  Auto Basophil # : 0.02 K/uL  Auto Neutrophil % : 68.3 %  Auto Lymphocyte % : 17.5 %  Auto Monocyte % : 10.2 %  Auto Eosinophil % : 2.3 %  Auto Basophil % : 0.2 %    07    138  |  100  |  28<H>  ----------------------------<  85  4.5   |  30  |  0.8    Ca    9.0      2020 05:39  Phos  4.4     07  Mg     2.0     07      Creatinine Trend: 0.8<--, 0.8<--, 0.8<--, 0.7<--, 1.0<--, 1.2<--    PT/INR - ( 2020 05:39 )   PT: 15.60 sec;   INR: 1.36 ratio                       Urinalysis Basic - ( 2020 19:35 )    Color: Light Yellow / Appearance: Clear / S.016 / pH: x  Gluc: x / Ketone: Negative  / Bili: Negative / Urobili: <2 mg/dL   Blood: x / Protein: Negative / Nitrite: Negative   Leuk Esterase: Negative / RBC: x / WBC x   Sq Epi: x / Non Sq Epi: x / Bacteria: x

## 2020-07-31 NOTE — PROGRESS NOTE ADULT - ASSESSMENT
Patient is a 80 y.o female w/ hx of AFIB on dig/ Cardizem /warfarin, HTN, HLD, CAD, COPD, recently started on 2L supplemental  oxygen presents to the ED for evaluation of dyspnea and productive cough.     Acute COPD Exacerbation  - Presented with worsening SOB and productive cough, desatturation to 91% on RA, states she is compliant with medication  - Never been intubated, former smoker 65 pack year hx, last COPD exacerbation was Sept 2019  - VBG with respiratory alkalosis, compensated   - Pulm recs appreciated:  d/c Solumedrol, started Prednisone. C/w inhalers, duoneb and BiPAP 4hrs on// 4hrs off (patient not tolerating BiPAP)  - C/w Doxycycline to complete 5 days   - CXR (7/25) improved from previous    AFIB:  Presenting with Junctional Bradycardia now with RVR  - Takes Digoxin and Cardizem at home for rate control  - Restart Coumadin  - Holding digoxin  - EP consulted: plan for pacemaker today    Essential HTN  - On hydralazine 25mg TID  - Vasotec 5mg BID qd  - Will consider adding Nifedipine is BPs still not controlled    CAD + HLD  - Echo (7/26): EF 58%, severe pulm HTN  - ECHO 2019: EF: 50-55%, Mild- mod mitral valve regurgitation  - Stress test from 2019: unremarkable  - C/w ASA and Statin     Misc  # Diet: DASH/TLC  # DVT Prophylaxis: warfarin  # GI Prophylaxis: protonix  # Activity: IAT  # Dispo: pending rate control of afib and better control of BP    Plan of care discussed with pt and daughter, no longer pursuing hospice. Will be d/c home with home care.    Letter of Medical Necessity for Hospital Bed for Cardiopulmonary Reasons  Patient is a 80 y.o female with history of Afib, HTN, HLD, CAD, COPD, recently started on 2L supplemental  oxygen who was admitted for Acute on Chronic CHF exacerbation. Due to this condition, patient requires a bed that will enable her to lift the head of the bed more than thirty degrees most of the time due to SOB while lying supine. Patient also requires frequent positioning of the body in ways not feasible with an ordinary bed due to decubitus prophylaxis. Pillows and wedges have been considered and ruled out.   Gel Overlay mattress - Patient will need a gel overlay to help with healing of current skin breakdown and to prevent further skin breakdown. It is medically necessary for patient to receive gel overlay due to her medical diagnosis.    # Code Status: Full code Patient is a 80 y.o female w/ hx of AFIB on dig/ Cardizem /warfarin, HTN, HLD, CAD, COPD, recently started on 2L supplemental  oxygen presents to the ED for evaluation of dyspnea and productive cough.     Acute COPD Exacerbation  - Presented with worsening SOB and productive cough, desaturation to 91% on RA, states she is compliant with medication  - Never been intubated, former smoker 65 pack year hx, last COPD exacerbation was Sept 2019  - VBG with respiratory alkalosis, compensated   - Pulm recs appreciated:  d/c Solumedrol, started Prednisone. C/w inhalers, duoneb and BiPAP 4hrs on// 4hrs off (patient not tolerating BiPAP)  - C/w Doxycycline to complete 5 days   - CXR (7/25) improved from previous    AFIB:  Presenting with Junctional Bradycardia now with RVR  - Takes Digoxin and Cardizem at home for rate control  - Restart Coumadin  - Holding digoxin  - EP consulted: plan for pacemaker today  - Continue Cardizem 180mg qd after procedure    Essential HTN  - On hydralazine 25mg TID  - Vasotec 5mg BID qd  - Will consider adding Nifedipine is BPs still not controlled    CAD + HLD  - Echo (7/26): EF 58%, severe pulm HTN  - ECHO 2019: EF: 50-55%, Mild- mod mitral valve regurgitation  - Stress test from 2019: unremarkable  - C/w ASA and Statin     Misc  # Diet: DASH/TLC  # DVT Prophylaxis: warfarin  # GI Prophylaxis: protonix  # Activity: IAT  # Dispo: pending rate control of afib and better control of BP    Plan of care discussed with pt and daughter, no longer pursuing hospice. Will be d/c home with home care.    Letter of Medical Necessity for Hospital Bed for Cardiopulmonary Reasons  Patient is a 80 y.o female with history of Afib, HTN, HLD, CAD, COPD, recently started on 2L supplemental  oxygen who was admitted for Acute on Chronic CHF exacerbation. Due to this condition, patient requires a bed that will enable her to lift the head of the bed more than thirty degrees most of the time due to SOB while lying supine. Patient also requires frequent positioning of the body in ways not feasible with an ordinary bed due to decubitus prophylaxis. Pillows and wedges have been considered and ruled out.   Gel Overlay mattress - Patient will need a gel overlay to help with healing of current skin breakdown and to prevent further skin breakdown. It is medically necessary for patient to receive gel overlay due to her medical diagnosis.    # Code Status: Full code

## 2020-07-31 NOTE — PROGRESS NOTE ADULT - SUBJECTIVE AND OBJECTIVE BOX
GENIE ROSENBERG  80y Female    INTERVAL HPI/OVERNIGHT EVENTS:    Pt seen after pacemaker placement today.  Daughter in law at bedside.  Cardizem restarted.    T(F): 96.9 (20 @ 13:27), Max: 97.9 (20 @ 21:22)  HR: 126 (20 @ 13:27) (72 - 126)  BP: 159/79 (20 @ 13:27) (134/93 - 176/95)  RR: 18 (20 @ 13:27) (18 - 18)  SpO2: --    I&O's Summary    2020 07:  -  2020 07:00  --------------------------------------------------------  IN: 570 mL / OUT: 300 mL / NET: 270 mL    2020 07:  -  2020 15:45  --------------------------------------------------------  IN: 0 mL / OUT: 450 mL / NET: -450 mL        Daily Height in cm: 170.18 (2020 07:28)    Daily Weight in k.7 (2020 05:13)    PHYSICAL EXAM:  GENERAL: NAD  HEAD:  Normocephalic  EYES:  conjunctiva and sclera clear  ENMT: Moist mucous membranes  NECK: Supple, No JVD  NERVOUS SYSTEM:  Alert & Oriented X3, Good concentration  CHEST/LUNG: Clear to percussion bilaterally; No rales, rhonchi, wheezing  HEART: tachy, irregular  ABDOMEN: Soft, Nontender, Nondistended  EXTREMITIES:  No edema       Consultant(s) Notes Reviewed:  [x ] YES  [ ] NO  Care Discussed with Consultants/Other Providers [ x] YES  [ ] NO    MEDICATIONS  (STANDING):  aspirin  chewable 81 milliGRAM(s) Oral daily  atorvastatin 10 milliGRAM(s) Oral at bedtime  budesonide 160 MICROgram(s)/formoterol 4.5 MICROgram(s) Inhaler 2 Puff(s) Inhalation two times a day  dextrose 5%. 1000 milliLiter(s) (50 mL/Hr) IV Continuous <Continuous>  dextrose 50% Injectable 12.5 Gram(s) IV Push once  dextrose 50% Injectable 25 Gram(s) IV Push once  dextrose 50% Injectable 25 Gram(s) IV Push once  diltiazem    milliGRAM(s) Oral daily  enalapril 5 milliGRAM(s) Oral every 12 hours  hydrALAZINE 25 milliGRAM(s) Oral three times a day  pantoprazole    Tablet 40 milliGRAM(s) Oral before breakfast  predniSONE   Tablet 20 milliGRAM(s) Oral daily  tiotropium 18 MICROgram(s) Capsule 1 Capsule(s) Inhalation daily  warfarin 5 milliGRAM(s) Oral daily    MEDICATIONS  (PRN):  acetaminophen   Tablet .. 650 milliGRAM(s) Oral every 6 hours PRN Mild Pain (1 - 3)  ALBUTerol    90 MICROgram(s) HFA Inhaler 2 Puff(s) Inhalation every 2 hours PRN Shortness of Breath and/or Wheezing  dextrose 40% Gel 15 Gram(s) Oral once PRN Blood Glucose LESS THAN 70 milliGRAM(s)/deciLiter  glucagon  Injectable 1 milliGRAM(s) IntraMuscular once PRN Glucose <70 milliGRAM(s)/deciLiter  zolpidem 5 milliGRAM(s) Oral at bedtime PRN Insomnia    Telemetry reviewed    LABS:                        12.1   10.18 )-----------( 321      ( 2020 05:39 )             38.6     07-30    138  |  100  |  28<H>  ----------------------------<  85  4.5   |  30  |  0.8    Ca    9.0      2020 05:39  Phos  4.4     07-31  Mg     2.0     07-31      PT/INR - ( 2020 05:39 )   PT: 15.60 sec;   INR: 1.36 ratio                   RADIOLOGY & ADDITIONAL TESTS:    Imaging or report Personally Reviewed:  [ ] YES  [ ] NO    < from: Xray Chest 1 View- PORTABLE-Urgent (20 @ 10:34) >  Findings:    Support devices: Left-sided dual-lead pacer. Overlying telemetry leads are noted.    Cardiac/mediastinum/hilum: Unchanged.    Lung parenchyma/Pleura: Emphysema. New right perihilar opacity. No evidence of pneumothorax.    Skeleton/soft tissues: Unchanged.    Impression:    New right perihilar opacity.      < end of copied text >      Case discussed with residents    Care discussed with pt and family

## 2020-07-31 NOTE — PRE-ANESTHESIA EVALUATION ADULT - NSANTHOSAYNRD_GEN_A_CORE
No. RACHAEL screening performed.  STOP BANG Legend: 0-2 = LOW Risk; 3-4 = INTERMEDIATE Risk; 5-8 = HIGH Risk Patient/Caregiver provided printed discharge information.

## 2020-07-31 NOTE — PROGRESS NOTE ADULT - ASSESSMENT
79 y/o woman with PMH of Afib on coumadin, HTN, hyperlipidemia, CAD, COPD and recently started on 2L supplemental oxygen presented to the ED for dyspnea and productive cough. Pt admits to baseline dyspnea, but it started to worsen last week. Hospital course complicated by junctional bradycardia and Afib with RVR  and HTN.    1. COPD exacerbation/chronic hypoxemic respiratory failure/ severe Pulm HTN (likely group 3)  improved on current therapy  Pulm f/u appreciated  slow steroid taper, nebs, symbicort  pt unable to use Spiriva  s/p doxycycline  pt not using bipap   outpt Pulm f/u    2. Afib with junctional bradycardia and RVR  sinus pause of 5.2 seconds  tachy/laura syndrome and SSS  s/p pacemaker placement on 7/31  Chest X-ray PA/Lat tomorrow at 6am  Device interrogation tomorrow in am  cardizem CD 240mg daily restarted for rate control  continue telemetry  continue coumadin - goal INR 2-3    3. HTN - on hydralazine and enalapril  cardizem restarted and monitor    4. CAD/hyperlipidemia - on medical therapy    5. DVT prophylaxis on coumadin    6. NSVT - continue telemetry          PROGRESS NOTE HANDOFF    Pending: pacemaker interrogation and CXR PA/lateral on 8/1  better control of HR and BP    Disposition: home with services when medically stable    Family discussion: with daughter in law Raquel today

## 2020-07-31 NOTE — PRE-ANESTHESIA EVALUATION ADULT - NSANTHADDINFOFT_GEN_ALL_CORE
Procedure/Risks explained for moderate/deep sedation wit GA backup + routine monitoring. Patient understands sated anesthetic plan and agrees to proceed.

## 2020-07-31 NOTE — CHART NOTE - NSCHARTNOTEFT_GEN_A_CORE
Electrophysiology Brief Post-Operative Note    I have personally seen and examined the patient.  I agree with the history and physical which I have reviewed and noted any changes below.  07-31-20 @ 07:23    DEVICE COMPANY:  -[employer]      PRE-OP DIAGNOSIS: sick sinus syndrome    POST-OP DIAGNOSIS: sick sinus syndrome    PROCEDURE:  Dual Chamber Pacemaker implant    Physician: BERTHA Siu MD  Assistant: None    ESTIMATED BLOOD LOSS: 20  mL    ANESTHESIA TYPE:  [  ]General Anesthesia  [X] Sedation  [X] Local/Regional    CONDITION  [  ] Critical  [  ] Serious  [  ]Fair  [X]Good    SPECIMENS REMOVED (IF APPLICABLE): None    IMPLANTS (IF APPLICABLE)  Dual Chamber Pacemaker implant      FINDINGS  No immediate complications  Contrast used: none      PLAN OF CARE  - Vancomyocin 1g IV in am one dose  - Chest X-ray portable now  - Chest X-ray PA/Lat tomorrow at 6am  - Device interrogation tomorrow in am  - Start Cardizem  mg daily first dose now  - Continue Warfarin without interruption, target INR first two weeks 2.0-2.6  - Do not bridge with heparin   - Coumadin clinic next week  - Discontinue Heparin, Lovenox, and NOACS for 48 hours  - Question for anticoagulation unless discussed with EP attending

## 2020-08-01 NOTE — PROGRESS NOTE ADULT - SUBJECTIVE AND OBJECTIVE BOX
Patient is a 80y old Female who presents with a chief complaint of COPD exacerbation (2020 15:45)    No acute events overnight. Patient tachy to 160bpm this morning but she has no complaints. Denies chest pain, SOB, N/V/D,      PAST MEDICAL & SURGICAL HISTORY  HTN (hypertension)  COPD without exacerbation  Afib  History of appendectomy  Kidney cysts      PHYSICAL EXAM  Vital Signs Last 24 Hrs  T(C): 36.6 (2020 21:38), Max: 36.6 (2020 21:38)  T(F): 97.9 (2020 21:38), Max: 97.9 (2020 21:38)  HR: 107 (01 Aug 2020 06:59) (69 - 126)  BP: 138/95 (01 Aug 2020 06:59) (134/93 - 159/79)  BP(mean): --  RR: 18 (2020 21:38) (18 - 18)  SpO2: --    I&O's Summary    2020 07:  -  01 Aug 2020 07:00  --------------------------------------------------------  IN: 0 mL / OUT: 450 mL / NET: -450 mL    01 Aug 2020 07:  -  01 Aug 2020 10:46  --------------------------------------------------------  IN: 275 mL / OUT: 0 mL / NET: 275 mL      GENERAL: NAD  HEAD:  Normocephalic  EYES:  conjunctiva and sclera clear  ENMT: Moist mucous membranes  NECK: Supple, No JVD  NERVOUS SYSTEM:  Alert & Oriented X3  CHEST/LUNG: Clear to percussion bilaterally; No rales, rhonchi, wheezing  HEART: tachy, irregular  ABDOMEN: Soft, Nontender, Nondistended  EXTREMITIES:  No edema      LABS                        11.9   14.90 )-----------( 322      ( 01 Aug 2020 05:27 )             37.8     Hemoglobin: 11.9 g/dL ( @ 05:27)  Hemoglobin: 11.7 g/dL ( @ 16:00)  Hemoglobin: 12.1 g/dL ( @ 05:39)  Hemoglobin: 11.7 g/dL ( @ 05:57)  Hemoglobin: 11.9 g/dL ( @ 06:02)    CBC Full  -  ( 01 Aug 2020 05:27 )  WBC Count : 14.90 K/uL  RBC Count : 4.19 M/uL  Hemoglobin : 11.9 g/dL  Hematocrit : 37.8 %  Platelet Count - Automated : 322 K/uL  Mean Cell Volume : 90.2 fL  Mean Cell Hemoglobin : 28.4 pg  Mean Cell Hemoglobin Concentration : 31.5 g/dL  Auto Neutrophil # : x  Auto Lymphocyte # : x  Auto Monocyte # : x  Auto Eosinophil # : x  Auto Basophil # : x  Auto Neutrophil % : x  Auto Lymphocyte % : x  Auto Monocyte % : x  Auto Eosinophil % : x  Auto Basophil % : x        134<L>  |  96<L>  |  30<H>  ----------------------------<  233<H>  5.0   |  25  |  1.1    Ca    8.7      2020 16:00  Phos  4.4       Mg     2.0         TPro  5.4<L>  /  Alb  3.5  /  TBili  0.3  /  DBili  x   /  AST  19  /  ALT  25  /  AlkPhos  72      Creatinine Trend: 1.1<--, 0.8<--, 0.8<--, 0.8<--, 0.7<--, 1.0<--  LIVER FUNCTIONS - ( 2020 16:00 )  Alb: 3.5 g/dL / Pro: 5.4 g/dL / ALK PHOS: 72 U/L / ALT: 25 U/L / AST: 19 U/L / GGT: x           PT/INR - ( 01 Aug 2020 05:27 )   PT: 18.00 sec;   INR: 1.57 ratio                       Urinalysis Basic - ( 2020 19:35 )    Color: Light Yellow / Appearance: Clear / S.016 / pH: x  Gluc: x / Ketone: Negative  / Bili: Negative / Urobili: <2 mg/dL   Blood: x / Protein: Negative / Nitrite: Negative   Leuk Esterase: Negative / RBC: x / WBC x   Sq Epi: x / Non Sq Epi: x / Bacteria: x

## 2020-08-01 NOTE — PROGRESS NOTE ADULT - ASSESSMENT
79 y/o woman with PMH of Afib on coumadin, HTN, hyperlipidemia, CAD, COPD and recently started on 2L supplemental oxygen presented to the ED for dyspnea and productive cough. Pt admits to baseline dyspnea, but it started to worsen last week. Hospital course complicated by junctional bradycardia and Afib with RVR  and HTN.    1. COPD exacerbation/chronic hypoxemic respiratory failure/ severe Pulm HTN (likely group 3)  improved on current therapy  Pulm f/u appreciated  slow steroid taper, nebs, symbicort  pt unable to use Spiriva  s/p doxycycline  pt not using bipap   outpt Pulm f/u    2. Afib with junctional bradycardia and RVR  sinus pause of 5.2 seconds  tachy/laura syndrome and SSS  s/p pacemaker placement on 7/31  s/p Device interrogation today  F/U in the EP office for wound check with Margoth NP 9/1/2020 @1pm  cardizem CD 240mg daily   metoprolol 25mg q12hr added  continue telemetry  continue coumadin - goal INR 2-3    3. HTN - on hydralazine and enalapril  cardizem and metoprolol now added  d/c hydralazine if SBP < 120    4. CAD/hyperlipidemia - on medical therapy    5. DVT prophylaxis on coumadin    6. Episode of BRBPR  possibly due to hemorrhoids - resident will do rectal exam  CBC at 4PM and in AM  Type and screen  monitor stools  per daughter in law, pt has had rectal bleeding and had colonoscopy < 1 year ago  monitor H/H          PROGRESS NOTE HANDOFF    Pending: better control of HR     Disposition: home with services when medically stable    Family discussion: with daughter in law Raquel today

## 2020-08-01 NOTE — PROGRESS NOTE ADULT - ASSESSMENT
Patient is a 80 y.o female w/ hx of AFIB on dig/ Cardizem /warfarin, HTN, HLD, CAD, COPD, recently started on 2L supplemental  oxygen presents to the ED for evaluation of dyspnea and productive cough.     Acute COPD Exacerbation  - Presented with worsening SOB and productive cough, desaturation to 91% on RA, states she is compliant with medication  - Never been intubated, former smoker 65 pack year hx, last COPD exacerbation was Sept 2019  - VBG with respiratory alkalosis, compensated   - Pulm recs appreciated:  d/c Solumedrol, started Prednisone. C/w inhalers, duoneb and BiPAP 4hrs on// 4hrs off (patient not tolerating BiPAP)    AFIB:  Presenting with Junctional Bradycardia now with RVR  - Takes Digoxin and Cardizem at home for rate control  - Holding digoxin  - Pt tachy to 160bpm this morning, added Metoprolol 25mg BID  - EP consulted: pacemaker placed (7/31)  - Interrogate today, f/u EPS  - On Cardizem 240 mg qd after procedure  - On Coumadin 5mg qd    Essential HTN  - On hydralazine 25mg TID  - Vasotec 5mg BID qd    CAD + HLD  - Echo (7/26): EF 58%, severe pulm HTN  - ECHO 2019: EF: 50-55%, Mild- mod mitral valve regurgitation  - Stress test from 2019: unremarkable  - C/w ASA and Statin     MISC  # Diet: DASH/TLC  # DVT Prophylaxis: warfarin  # GI Prophylaxis: protonix  # Activity: IAT  # Dispo: pending rate control of afib and better control of BP    Plan of care discussed with pt and daughter, no longer pursuing hospice. Will be d/c home with home care.    Letter of Medical Necessity for Hospital Bed for Cardiopulmonary Reasons  Patient is a 80 y.o female with history of Afib, HTN, HLD, CAD, COPD, recently started on 2L supplemental  oxygen who was admitted for Acute on Chronic CHF exacerbation. Due to this condition, patient requires a bed that will enable her to lift the head of the bed more than thirty degrees most of the time due to SOB while lying supine. Patient also requires frequent positioning of the body in ways not feasible with an ordinary bed due to decubitus prophylaxis. Pillows and wedges have been considered and ruled out.   Gel Overlay mattress - Patient will need a gel overlay to help with healing of current skin breakdown and to prevent further skin breakdown. It is medically necessary for patient to receive gel overlay due to her medical diagnosis.    # Code Status: Full code

## 2020-08-01 NOTE — PROGRESS NOTE ADULT - SUBJECTIVE AND OBJECTIVE BOX
GENIE ROSENBERG  80y Female    INTERVAL HPI/OVERNIGHT EVENTS:    Pt restarted on cardizem yesterday but still tachy to the 110's today.  metoprolol restarted.  No other complaints.   Later had some BRBPR while on the toilet.    T(F): 97.6 (20 @ 13:51), Max: 97.9 (20 @ 21:38)  HR: 69 (20 @ 13:51) (69 - 107)  BP: 137/63 (20 @ 13:51) (137/63 - 155/72)  RR: 18 (20 @ 13:51) (18 - 18)  SpO2: --    I&O's Summary    2020 07:  -  01 Aug 2020 07:00  --------------------------------------------------------  IN: 0 mL / OUT: 450 mL / NET: -450 mL    01 Aug 2020 07:  -  01 Aug 2020 14:11  --------------------------------------------------------  IN: 275 mL / OUT: 0 mL / NET: 275 mL       Daily Weight in k.7 (01 Aug 2020 06:31)      PHYSICAL EXAM:  GENERAL: NAD  HEAD:  Normocephalic  EYES:  conjunctiva and sclera clear  ENMT: Moist mucous membranes  NECK: Supple, No JVD  NERVOUS SYSTEM:  Alert & Oriented X3, Good concentration  CHEST/LUNG: Clear to percussion bilaterally with decreased BS  pacemaker with dressing , some ecchymosis  HEART: IRR  ABDOMEN: Soft, Nontender, Nondistended  EXTREMITIES: No edema       Consultant(s) Notes Reviewed:  [x ] YES  [ ] NO  Care Discussed with Consultants/Other Providers [ x] YES  [ ] NO    MEDICATIONS  (STANDING):  aspirin  chewable 81 milliGRAM(s) Oral daily  atorvastatin 10 milliGRAM(s) Oral at bedtime  budesonide 160 MICROgram(s)/formoterol 4.5 MICROgram(s) Inhaler 2 Puff(s) Inhalation two times a day  dextrose 5%. 1000 milliLiter(s) (50 mL/Hr) IV Continuous <Continuous>  dextrose 50% Injectable 12.5 Gram(s) IV Push once  dextrose 50% Injectable 25 Gram(s) IV Push once  dextrose 50% Injectable 25 Gram(s) IV Push once  diltiazem    milliGRAM(s) Oral daily  enalapril 5 milliGRAM(s) Oral every 12 hours  hydrALAZINE 25 milliGRAM(s) Oral three times a day  metoprolol tartrate 25 milliGRAM(s) Oral two times a day  pantoprazole    Tablet 40 milliGRAM(s) Oral before breakfast  predniSONE   Tablet 20 milliGRAM(s) Oral daily  tiotropium 18 MICROgram(s) Capsule 1 Capsule(s) Inhalation daily  warfarin 5 milliGRAM(s) Oral daily    MEDICATIONS  (PRN):  acetaminophen   Tablet .. 650 milliGRAM(s) Oral every 6 hours PRN Mild Pain (1 - 3)  ALBUTerol    90 MICROgram(s) HFA Inhaler 2 Puff(s) Inhalation every 2 hours PRN Shortness of Breath and/or Wheezing  dextrose 40% Gel 15 Gram(s) Oral once PRN Blood Glucose LESS THAN 70 milliGRAM(s)/deciLiter  glucagon  Injectable 1 milliGRAM(s) IntraMuscular once PRN Glucose <70 milliGRAM(s)/deciLiter  zolpidem 5 milliGRAM(s) Oral at bedtime PRN Insomnia    Telemetry reviewed    LABS:                        11.9   14.90 )-----------( 322      ( 01 Aug 2020 05:27 )             37.8     07-    134<L>  |  96<L>  |  30<H>  ----------------------------<  233<H>  5.0   |  25  |  1.1    Ca    8.7      2020 16:00  Phos  4.4     07  Mg     2.0         TPro  5.4<L>  /  Alb  3.5  /  TBili  0.3  /  DBili  x   /  AST  19  /  ALT  25  /  AlkPhos  72  07    PT/INR - ( 01 Aug 2020 05:27 )   PT: 18.00 sec;   INR: 1.57 ratio                   RADIOLOGY & ADDITIONAL TESTS:    Imaging or report Personally Reviewed:  [x ] YES  [ ] NO    < from: Xray Chest 2 Views PA/Lat (20 @ 10:32) >  Impression:    Pulmonary vascular congestion and small bilateral pleural effusions. No parenchymal opacity or air leak      < end of copied text >      Case discussed with residents    Care discussed with pt and family

## 2020-08-01 NOTE — PROGRESS NOTE ADULT - ASSESSMENT
80 y.o female w/ hx of AFIB on dig/cardizem/warfarin, HTN, HLD, CAD, COPD, recently started on 2L supplemental  oxygen presents to the ED for evaluation of dyspnea and productive cough.  She was admitted to tele for COPD exacerbation.  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 Do not raise your arm above shoulder level for 4-6 weeks.   No driving for 2weeks  No shower, bathtub, no wetting device site for 5 days.  Can take a shower on _Tue  _ , remove dressing at that time, leave Steri-strips in place

## 2020-08-02 NOTE — DISCHARGE NOTE PROVIDER - HOSPITAL COURSE
81 y/o woman with PMH of Afib on coumadin, HTN, hyperlipidemia, CAD, COPD and recently started on 2L supplemental oxygen presented to the ED for dyspnea and productive cough. Pt admits to baseline dyspnea, but it started to worsen last week. Hospital course complicated by junctional bradycardia and Afib with RVR  and HTN.        1. COPD exacerbation/chronic hypoxemic respiratory failure/ severe Pulm HTN (likely group 3)    improved on current therapy    Pulm f/u appreciated    slow steroid taper, nebs, symbicort    pt unable to use Spiriva    s/p doxycycline    pt not using bipap     outpt Pulm f/u        2. Afib with junctional bradycardia and RVR    sinus pause of 5.2 seconds    tachy/laura syndrome and SSS    s/p pacemaker placement on 7/31    s/p Device interrogation 8/1    F/U in the EP office for wound check with Margoth NP 9/1/2020 @1pm    cardizem CD 240mg daily     metoprolol 25mg q12hr added and HR now better controlled overall    continue coumadin - goal INR 2-3    outpt INR monitoring        3. HTN - on hydralazine and enalapril    cardizem and metoprolol now added        4. CAD/hyperlipidemia - on medical therapy        5. DVT prophylaxis on coumadin        6. Episode of BRBPR yesterday    possibly due to hemorrhoids     stool softener on discharge    CBC stable    per daughter in law, pt has had rectal bleeding and had colonoscopy < 1 year ago

## 2020-08-02 NOTE — DISCHARGE NOTE PROVIDER - NSDCFUADDINST_GEN_ALL_CORE_FT
F/U in the EP office for wound check with Margoth BLANDON 9/1/2020 @1pm  Dr Siu office  [address] [place] [address], [address]  [phone]     No Heavy lifting >5 lbs. Do not raise your arm above shoulder level for 4-6 weeks.   No driving for 2weeks  No shower, bathtub, no wetting device site for 5 days.  Can take a shower on _Tue  _ , remove dressing at that time, leave Steri-strips in place

## 2020-08-02 NOTE — DISCHARGE NOTE PROVIDER - PROVIDER TOKENS
PROVIDER:[TOKEN:[[id number]],FOLLOWUP:[1 week],ESTABLISHEDPATIENT:[T]],PROVIDER:[TOKEN:[[id number]],FOLLOWUP:[2 weeks],ESTABLISHEDPATIENT:[T]],PROVIDER:[TOKEN:[[id number]],FOLLOWUP:[1 month]]

## 2020-08-02 NOTE — DISCHARGE NOTE PROVIDER - CARE PROVIDER_API CALL
Nirmal Guadarrama  INTERNAL MEDICINE  [address] [place] [address]  Phone: [phone]  Fax: [phone]  Established Patient  Follow Up Time: 1 week    Juan Hairston)  Cardiovascular Disease; Internal Medicine  [address] [place] [address]  Phone: [phone]  Fax: [phone]  Established Patient  Follow Up Time: 2 weeks    Sam Siu)  Cardiac Electrophysiology; Cardiovascular Disease; [facility] Medicine  [address] [place] [address]  Phone: [phone]  Fax: [phone]  Follow Up Time: 1 month

## 2020-08-02 NOTE — DISCHARGE NOTE PROVIDER - NSDCFUSCHEDAPPT_GEN_ALL_CORE_FT
GENIE ROSENBERG ; 09/01/2020 ; NPP Card[facility] [address] GENIE ROSENBERG ; 09/01/2020 ; NPP Cardio [facility]

## 2020-08-02 NOTE — PROGRESS NOTE ADULT - REASON FOR ADMISSION
COPD exacerbation

## 2020-08-02 NOTE — PROGRESS NOTE ADULT - ASSESSMENT
81 y/o woman with PMH of Afib on coumadin, HTN, hyperlipidemia, CAD, COPD and recently started on 2L supplemental oxygen presented to the ED for dyspnea and productive cough. Pt admits to baseline dyspnea, but it started to worsen last week. Hospital course complicated by junctional bradycardia and Afib with RVR  and HTN.    1. COPD exacerbation/chronic hypoxemic respiratory failure/ severe Pulm HTN (likely group 3)  improved on current therapy  Pulm f/u appreciated  slow steroid taper, nebs, symbicort  pt unable to use Spiriva  s/p doxycycline  pt not using bipap   outpt Pulm f/u    2. Afib with junctional bradycardia and RVR  sinus pause of 5.2 seconds  tachy/laura syndrome and SSS  s/p pacemaker placement on 7/31  s/p Device interrogation 8/1  F/U in the EP office for wound check with Margoth NP 9/1/2020 @1pm  cardizem CD 240mg daily   metoprolol 25mg q12hr added and HR now better controlled overall  continue coumadin - goal INR 2-3  outpt INR monitoring    3. HTN - on hydralazine and enalapril  cardizem and metoprolol now added    4. CAD/hyperlipidemia - on medical therapy    5. DVT prophylaxis on coumadin    6. Episode of BRBPR yesterday  possibly due to hemorrhoids   stool softener on discharge  CBC stable  per daughter in law, pt has had rectal bleeding and had colonoscopy < 1 year ago      Pt for discharge home today with services  Will discuss discharge plan with daughter in law at bedside this afternoon    Pt at risk for readmission due to multiple comorbidities    Spent 45 minutes on the discharge process of this pt

## 2020-08-02 NOTE — PROGRESS NOTE ADULT - SUBJECTIVE AND OBJECTIVE BOX
CHET, GENIE  80y Female    INTERVAL HPI/OVERNIGHT EVENTS:    Pt had one episode of tachycardia at 6:30 AM that resolved on its own.  Otherwise HR is better controlled over the past 24 hours.  NO complaints.  Some tenderness of pacemaker site.      T(F): 96.5 (20 @ 05:00), Max: 97.6 (20 @ 13:51)  HR: 75 (20 @ 06:40) (69 - 124)  BP: 150/84 (20 @ 06:40) (137/63 - 167/77)  RR: 18 (20 @ 05:00) (18 - 18)  SpO2: 98% (20 @ 10:00) (85% - 98%) on O2 NC    I&O's Summary    01 Aug 2020 07:  -  02 Aug 2020 07:00  --------------------------------------------------------  IN: 770 mL / OUT: 0 mL / NET: 770 mL    02 Aug 2020 07:  -  02 Aug 2020 11:00  --------------------------------------------------------  IN: 475 mL / OUT: 0 mL / NET: 475 mL      Daily Weight in k.6 (02 Aug 2020 05:00)      PHYSICAL EXAM:  GENERAL: NAD  HEAD:  Normocephalic  EYES:  conjunctiva and sclera clear  ENMT: Moist mucous membranes  NECK: Supple, No JVD  NERVOUS SYSTEM:  Alert, awake, Good concentration  CHEST/LUNG: decreased BS b/l  HEART: IRR  ABDOMEN: Soft, Nontender, Nondistended  EXTREMITIES:   No edema  SKIN: pacemaker site with dressing  + ecchymosis    Consultant(s) Notes Reviewed:  [x ] YES  [ ] NO  Care Discussed with Consultants/Other Providers [ x] YES  [ ] NO    MEDICATIONS  (STANDING):  aspirin  chewable 81 milliGRAM(s) Oral daily  atorvastatin 10 milliGRAM(s) Oral at bedtime  budesonide 160 MICROgram(s)/formoterol 4.5 MICROgram(s) Inhaler 2 Puff(s) Inhalation two times a day  dextrose 5%. 1000 milliLiter(s) (50 mL/Hr) IV Continuous <Continuous>  dextrose 50% Injectable 12.5 Gram(s) IV Push once  dextrose 50% Injectable 25 Gram(s) IV Push once  dextrose 50% Injectable 25 Gram(s) IV Push once  diltiazem    milliGRAM(s) Oral daily  enalapril 5 milliGRAM(s) Oral every 12 hours  hydrALAZINE 25 milliGRAM(s) Oral three times a day  metoprolol tartrate 25 milliGRAM(s) Oral two times a day  pantoprazole    Tablet 40 milliGRAM(s) Oral before breakfast  predniSONE   Tablet 20 milliGRAM(s) Oral daily  tiotropium 18 MICROgram(s) Capsule 1 Capsule(s) Inhalation daily  warfarin 5 milliGRAM(s) Oral daily    MEDICATIONS  (PRN):  acetaminophen   Tablet .. 650 milliGRAM(s) Oral every 6 hours PRN Mild Pain (1 - 3)  ALBUTerol    90 MICROgram(s) HFA Inhaler 2 Puff(s) Inhalation every 2 hours PRN Shortness of Breath and/or Wheezing  dextrose 40% Gel 15 Gram(s) Oral once PRN Blood Glucose LESS THAN 70 milliGRAM(s)/deciLiter  glucagon  Injectable 1 milliGRAM(s) IntraMuscular once PRN Glucose <70 milliGRAM(s)/deciLiter  zolpidem 5 milliGRAM(s) Oral at bedtime PRN Insomnia    Telemetry reviewed    LABS:                        11.8   11.90 )-----------( 308      ( 02 Aug 2020 06:34 )             38.1         134<L>  |  96<L>  |  30<H>  ----------------------------<  233<H>  5.0   |  25  |  1.1    Ca    8.7      2020 16:00  Mg     2.0         TPro  5.4<L>  /  Alb  3.5  /  TBili  0.3  /  DBili  x   /  AST  19  /  ALT  25  /  AlkPhos  72      PT/INR - ( 02 Aug 2020 06:34 )   PT: 20.60 sec;   INR: 1.79 ratio                       Care discussed with pt and family

## 2020-08-02 NOTE — DISCHARGE NOTE PROVIDER - NSDCCPCAREPLAN_GEN_ALL_CORE_FT
PRINCIPAL DISCHARGE DIAGNOSIS  Diagnosis: COPD exacerbation  Assessment and Plan of Treatment: You had COPD exacerbation that improved with IV steroids, inhalers and nebulizer therapy. Please take prednisone 20mg daily for another 5 days. Take your home inhalers and continue oxygen.  Follow up with Dr. Guadarrama.      SECONDARY DISCHARGE DIAGNOSES  Diagnosis: Atrial fibrillation  Assessment and Plan of Treatment: You have atrial fibrillation with a fast heart rate that is now controlled on metoprolol and cardizem. DO NOT TAKE DIGOXIN ANYMORE.  Continue to take coumadin and have your INR monitored.  keep INR 2- 2.5    Diagnosis: Sick sinus syndrome  Assessment and Plan of Treatment: You needed a pacemaker placed because you had long pauses. Follow up with Electrophysiology as outpatient.

## 2020-08-02 NOTE — DISCHARGE NOTE NURSING/CASE MANAGEMENT/SOCIAL WORK - PATIENT PORTAL LINK FT
You can access the FollowMyHealth Patient Portal offered by [facility] by registering at the following website: [email or web address]. By joining [employer]’s FollowMyHealth portal, you will also be able to view your health information using other applications (apps) compatible with our system.

## 2020-08-02 NOTE — DISCHARGE NOTE PROVIDER - NSDCMRMEDTOKEN_GEN_ALL_CORE_FT
acetaminophen 325 mg oral tablet: 2 tab(s) orally every 6 hours, As needed, Mild Pain (1 - 3)  aspirin 81 mg oral tablet: 1 tab(s) orally once a day  dilTIAZem 240 mg/24 hours oral capsule, extended release: 1 cap(s) orally once a day  enalapril 5 mg oral tablet: 1 tab(s) orally every 12 hours  hydrALAZINE 25 mg oral tablet: 1 tab(s) orally 3 times a day  metoprolol tartrate 25 mg oral tablet: 1 tab(s) orally 2 times a day  pantoprazole 40 mg oral delayed release tablet: 1 tab(s) orally once a day (before a meal)  pravastatin 40 mg oral tablet: 1 tab(s) orally once a day  predniSONE 20 mg oral tablet: 1 tab(s) orally once a day  Trelegy Ellipta inhalation powder: 1 puff(s) inhaled once a day  Ventolin HFA 90 mcg/inh inhalation aerosol: 2 puff(s) inhaled every 6 hours, As Needed   warfarin 5 mg oral tablet: TAKE 0.5 Tablets (2.5 mg) on Monday and Friday.  Take 1 tablet (5 mg) the rest of the week acetaminophen 325 mg oral tablet: 2 tab(s) orally every 6 hours, As needed, Mild Pain (1 - 3)  Ambien 5 mg oral tablet: 1 tab(s) orally once a day (at bedtime) MDD:5 mg   aspirin 81 mg oral tablet: 1 tab(s) orally once a day  dilTIAZem 240 mg/24 hours oral capsule, extended release: 1 cap(s) orally once a day  enalapril 5 mg oral tablet: 1 tab(s) orally every 12 hours  hydrALAZINE 25 mg oral tablet: 1 tab(s) orally 3 times a day  metoprolol tartrate 25 mg oral tablet: 1 tab(s) orally 2 times a day  MiraLax oral powder for reconstitution: 17 gram(s) orally once a day   pantoprazole 40 mg oral delayed release tablet: 1 tab(s) orally once a day (before a meal)  pravastatin 40 mg oral tablet: 1 tab(s) orally once a day  predniSONE 20 mg oral tablet: 1 tab(s) orally once a day  senna oral tablet: 2 tab(s) orally once a day (at bedtime)   Trelegy Ellipta inhalation powder: 1 puff(s) inhaled once a day  Ventolin HFA 90 mcg/inh inhalation aerosol: 2 puff(s) inhaled every 6 hours, As Needed   warfarin 5 mg oral tablet: TAKE 0.5 Tablets (2.5 mg) on Monday and Friday.  Take 1 tablet (5 mg) the rest of the week

## 2020-08-02 NOTE — PROGRESS NOTE ADULT - PROVIDER SPECIALTY LIST ADULT
Electrophysiology
Electrophysiology
Hospitalist
Internal Medicine
Pulmonology
Hospitalist
Internal Medicine
Hospitalist

## 2020-08-02 NOTE — DISCHARGE NOTE PROVIDER - CARE PROVIDERS DIRECT ADDRESSES
,DirectAddress_Unknown,[email or web address]

## 2020-08-12 PROBLEM — Z51.81 ANTICOAGULATION GOAL OF INR 2 TO 3: Status: ACTIVE | Noted: 2020-01-01

## 2020-08-13 PROBLEM — I48.91 A-FIB: Status: ACTIVE | Noted: 2020-01-01

## 2020-08-13 PROBLEM — J44.9 COPD (CHRONIC OBSTRUCTIVE PULMONARY DISEASE): Status: ACTIVE | Noted: 2019-09-16

## 2020-08-13 NOTE — HISTORY OF PRESENT ILLNESS
[Home] : at home, [unfilled] , at the time of the visit. [Other Location: e.g. Home (Enter Location, City,State)___] : at [unfilled] [Verbal consent obtained from patient] : the patient, [unfilled] [FreeTextEntry1] : Pt evaluated for SOB. [de-identified] : This is an 79 y/o female hospitalized between 7/24/20- 8/2/20.  PMH- A-fib, HTN, Hyperlipidemia, CAD, COPD and home o2 2L.  Patient was treated with steroids, nebulizers  and symbicort.  She was d/c with trelegy.  At time of visit, patient is feeling well, speaking in full sentences and reports POX 95% today on 2 L NC o2.   Pt has pulmonologist appointment yesterday, no treatment changed.

## 2020-08-13 NOTE — COUNSELING
[Fall prevention counseling provided] : Fall prevention counseling provided [No throw rugs] : No throw rugs [Use proper foot wear] : Use proper foot wear [Use recommended devices] : Use recommended devices [de-identified] :  Advised patient to adhere to all medications including demonstrating proper use of inhalers and nebulizers. Encourage the use of proper breathing techniques such as pursed lip breathing or leaning forward during exhalation. Patient understands proper use of oxygen therapy. Increase activity as tolerated and maintain optimal activity levels. Continue coughing and deep breathing exercises including use of Incentive Spirometry. Receive routine pneumococcal and influenza vaccinations. Identify early signs and sx of disease and notify NP/MD. Maintain proper nutrition and hydration. Follow up with Pulmonologist within 7 days of discharge. Contact information given, patient advised to call with any questions/concerns. \par \par

## 2020-08-13 NOTE — PLAN
[FreeTextEntry1] : COPD\par c/w o2 2L NC\par c/wtrelegy 1 puff daily\par c/w prednisone 20 mg daily\par c/w ventolin 90 mcg/inh as needed\par \par HTN- \par c/w enalapril 5 mg twice daily\par c/w hydralazine 25 mg 3 times daily\par \par Atrial fibrillation\par c/w ditiazem 240 mg daily\par c/w aspirin 81 mg daily\par c/w warfarin 5 mg Tues, Wed, Thurs, Sat, Sunday\par warfarin 2.5 mg Monday, Friday\par

## 2020-08-13 NOTE — PHYSICAL EXAM
[No Respiratory Distress] : no respiratory distress  [No Accessory Muscle Use] : no accessory muscle use [No Focal Deficits] : no focal deficits [Normal Affect] : the affect was normal [Normal Insight/Judgement] : insight and judgment were intact [Normal] : affect was normal and insight and judgment were intact [de-identified] : unable to visualize edema

## 2020-08-13 NOTE — HISTORY OF PRESENT ILLNESS
[Home] : at home, [unfilled] , at the time of the visit. [Other:____] : [unfilled] [Verbal consent obtained from patient] : the patient, [unfilled] [FreeTextEntry1] : pt evaluated for COPD [de-identified] : This is

## 2020-08-13 NOTE — REVIEW OF SYSTEMS
[Lower Ext Edema] : lower extremity edema [Dyspnea on Exertion] : dyspnea on exertion [Negative] : Psychiatric [FreeTextEntry5] : trace LLE

## 2020-08-19 PROBLEM — Z86.79 HISTORY OF HYPERTENSIVE HEART DISEASE: Status: RESOLVED | Noted: 2020-01-01 | Resolved: 2020-01-01

## 2020-09-01 PROBLEM — I49.5 SICK SINUS SYNDROME DUE TO SA NODE DYSFUNCTION: Status: ACTIVE | Noted: 2020-01-01

## 2020-09-01 PROBLEM — I48.19 PERSISTENT ATRIAL FIBRILLATION: Status: ACTIVE | Noted: 2019-03-01

## 2020-09-01 PROBLEM — Z45.018 ENCOUNTER FOR INTERROGATION OF CARDIAC PACEMAKER: Status: ACTIVE | Noted: 2020-01-01

## 2020-09-01 NOTE — HISTORY OF PRESENT ILLNESS
[de-identified] : 80 years old frail appearing female with home O2. Was seen by EP team in [facility] during her admission for COPD exacerbation in July, 2020. She has h/o HTN, HLD, CAD, AFIB on digs, cardizem and warfarin.\par patient was found to have tachy laura with sinus arrest. A dual chamber PPM was implanted on 7/31/2020\par \par Presents for initial device interrogation and incision check\par \par c/o chronic  sob, [place], PND, orthopnea, no palpitations, no dizziness,lightheadedness, denies chest pains\par CARDIAC TESTING\par ECG ( 9/1/2020)  88 bpm atrial fib TW neg in V4, V5, V6 . Q wave in V2, V3\par ECHO ( 7/26/2020)  EF 58% RA enlargement, Mild to Mod MR and TR\par \par

## 2020-09-01 NOTE — ASSESSMENT
[FreeTextEntry1] : Sick sinus syndrome with sinus arrest\par s/p Dual chamber PPM implant on 7/31/2020\par multiple NSVT/PSVT  - all afib with RVR ( undersensing on the A)\par \par Wound checks - incision site healed well\par no erythema\par \par Permanent AFIB  \par c/w warfarin- monitored by coumadin clinic - denies any bleeding\par c/w CARTIA XT 240mg daily\par c/w metoprolol succ 25mg daily\par \par Patient on remote monitoring\par Remote monitoring was discussed with patient, schedule, process,  as well as associated co-pay that may not covered by their insurance.\par \par \par RTO in 3 months

## 2020-09-01 NOTE — REVIEW OF SYSTEMS
[Eyeglasses] : currently wearing eyeglasses [Loss Of Hearing] : hearing loss [Shortness Of Breath] : shortness of breath [Dyspnea on exertion] : dyspnea during exertion [Negative] : Heme/Lymph [Chest Pain] : no chest pain [Lower Ext Edema] : no extremity edema [Palpitations] : no palpitations [Cough] : no cough [Wheezing] : no wheezing

## 2020-09-01 NOTE — PHYSICAL EXAM
[General Appearance - Well Developed] : well developed [Normal Appearance] : normal appearance [Well Groomed] : well groomed [Heart Sounds] : normal S1 and S2 [Edema] : no peripheral edema present [Normal] : the heart rate was normal [Irregularly Irregular] : the rhythm was irregularly irregular [] : no respiratory distress [Respiration, Rhythm And Depth] : normal respiratory rhythm and effort [Exaggerated Use Of Accessory Muscles For Inspiration] : no accessory muscle use [Left Infraclavicular] : left infraclavicular area [Clean] : clean [Dry] : dry [Well-Healed] : well-healed [Abdomen Soft] : soft [Nail Clubbing] : no clubbing of the fingernails [FreeTextEntry1] : on continous 02 [Erythema] : not erythematous [Tender] : not tender

## 2020-09-01 NOTE — PROCEDURE
[No] : not [Atrial Fibrillation] : atrial fibrillation [Pacemaker] : pacemaker [Threshold Testing Performed] : Threshold testing was performed [Lead Imp:  ___ohms] : lead impedance was [unfilled] ohms [Sensing Amplitude ___mv] : sensing amplitude was [unfilled] mv [___V @] : [unfilled] V [___ ms] : [unfilled] ms [Programmed for Longevity] : output reprogrammed for improved battery longevity [de-identified] : Medtronic SANTOSH XT DR VIDAL [de-identified] : W1DR01 [de-identified] : [id number] [de-identified] : 7/31/2020 [de-identified] : AAI-DDD [de-identified] : 70 [de-identified] : AF detection is off\par  36.2%  MVP on\par AP 4.0%

## 2020-09-29 PROBLEM — Z86.79 HISTORY OF MITRAL VALVE INSUFFICIENCY: Status: RESOLVED | Noted: 2020-01-01 | Resolved: 2020-01-01

## 2020-09-29 PROBLEM — N28.1 CYST OF KIDNEY, ACQUIRED: Status: RESOLVED | Noted: 2020-01-01 | Resolved: 2020-01-01

## 2020-09-29 PROBLEM — Z86.79 HISTORY OF ATRIAL FIBRILLATION: Status: RESOLVED | Noted: 2020-01-01 | Resolved: 2020-01-01

## 2020-11-30 ENCOUNTER — APPOINTMENT (OUTPATIENT)
Dept: MEDICATION MANAGEMENT | Facility: CLINIC | Age: 80
End: 2020-11-30

## 2020-12-07 ENCOUNTER — APPOINTMENT (OUTPATIENT)
Dept: MEDICATION MANAGEMENT | Facility: CLINIC | Age: 80
End: 2020-12-07

## 2020-12-14 ENCOUNTER — APPOINTMENT (OUTPATIENT)
Dept: MEDICATION MANAGEMENT | Facility: CLINIC | Age: 80
End: 2020-12-14

## 2020-12-15 ENCOUNTER — APPOINTMENT (OUTPATIENT)
Dept: CARDIOLOGY | Facility: CLINIC | Age: 80
End: 2020-12-15

## 2020-12-21 ENCOUNTER — APPOINTMENT (OUTPATIENT)
Dept: MEDICATION MANAGEMENT | Facility: CLINIC | Age: 80
End: 2020-12-21

## 2020-12-23 NOTE — DISCHARGE NOTE NURSING/CASE MANAGEMENT/SOCIAL WORK - NSDCPEPTCOWADIET_GEN_ALL_CORE
No Keep your intake of Vitamin K Regular. The highest amount of vitamin K is found in green and leafy vegetables like broccoli, lettuces, cabbage and spinach. You can eat these foods however keep the amount the same as changes in the amount you eat can affect your INR blood tests. Contact your doctor before making any major changes in your diet.    Limit your alcohol intake.

## 2020-12-28 ENCOUNTER — APPOINTMENT (OUTPATIENT)
Dept: MEDICATION MANAGEMENT | Facility: CLINIC | Age: 80
End: 2020-12-28

## 2021-03-08 ENCOUNTER — APPOINTMENT (OUTPATIENT)
Dept: CARDIOLOGY | Facility: CLINIC | Age: 81
End: 2021-03-08

## 2021-05-31 NOTE — ASU PATIENT PROFILE, ADULT - AS SC [facility]AD[facility] [employer]
[facility]  Department of Emergency Medicine     Written by: Sam Royal DO  Patient Name: Saurabh Rizo  Attending Provider: No att. providers found  Admit Date: 2021  1:08 PM  MRN: [id number]                   : 1983        Chief Complaint   Patient presents with    Abdominal Pain     x couple of days, mid abd radiating to L side and back, emesis, BGL's trending up per pt     - Chief complaint    HPI     Patient is a 19-year-old female with insulin-dependent DM 1, history of multiple encounters for abdominal pain, recent diagnosis of diabetic gastroparesis, GERD, HTN, hypothyroidism, who presents the ED due to chief complaint of abdominal pain, fatigue, with nausea and vomiting, and constipation; patient states she has not had a bowel movement in 3-4 days. Her abdominal pain is generalized, worse in the epigastric region, worse with palpation and certain positions, and radiates to her left side; states that it is not improved with at home capsaicin cream or Bentyl. Patient also has history of opiate use disorder, per chart review of her last PCP encounter she admitted to street Suboxone use for her pain; she denies any use today, does state that she smokes marijuana and last did so 4 days ago. Denies any fevers, neck pain or stiffness, cough or sore throat, chest pain or palpitations, shortness of breath, diarrhea, black or bloody stools, urinary symptoms, numbness or tingling anywhere, lower extremity edema or tenderness. Denies any history of abdominal surgeries. Review of Systems   Constitutional: Positive for fatigue. Negative for chills and fever. HENT: Negative for rhinorrhea and sore throat. Eyes: Negative for visual disturbance. Respiratory: Negative for cough and shortness of breath. Cardiovascular: Negative for chest pain and palpitations. Gastrointestinal: Positive for abdominal pain, constipation, nausea and vomiting.  Negative for anal bleeding, blood in stool and diarrhea. Endocrine: Negative for polydipsia and polyuria. Genitourinary: Negative for dysuria and frequency. Musculoskeletal: Negative for back pain and myalgias. Skin: Negative for rash and wound. Neurological: Negative for dizziness and headaches. Psychiatric/Behavioral: Negative for confusion. All other systems reviewed and are negative. Physical Exam  Vitals and nursing note reviewed. Constitutional:       General: She is in acute distress. Comments: Patient is tearful and appears uncomfortable due to symptoms. HENT:      Head: Normocephalic and atraumatic. Right Ear: External ear normal.      Left Ear: External ear normal.      Nose: Nose normal. No rhinorrhea. Mouth/Throat:      Mouth: Mucous membranes are moist.      Pharynx: Oropharynx is clear. Eyes:      Extraocular Movements: Extraocular movements intact. Conjunctiva/sclera: Conjunctivae normal.      Pupils: Pupils are equal, round, and reactive to light. Cardiovascular:      Rate and Rhythm: Regular rhythm. Tachycardia present. Pulses: Normal pulses. Heart sounds: Normal heart sounds. Pulmonary:      Effort: Pulmonary effort is normal. No respiratory distress. Breath sounds: Normal breath sounds. No wheezing or rales. Abdominal:      General: Abdomen is flat. Bowel sounds are normal. There is no distension. Palpations: Abdomen is soft. Tenderness: There is generalized abdominal tenderness and tenderness in the epigastric area. There is left CVA tenderness. There is no right CVA tenderness, guarding or rebound. Musculoskeletal:         General: Normal range of motion. Cervical back: Normal range of motion and neck supple. Right lower leg: No edema. Left lower leg: No edema. Skin:     General: Skin is warm and dry. Capillary Refill: Capillary refill takes less than 2 seconds.       Coloration: Skin is not cyanotic, jaundiced, mottled or pale. Findings: No erythema or rash. Neurological:      General: No focal deficit present. Mental Status: She is alert and oriented to person, place, and time. Sensory: No sensory deficit. Psychiatric:         Mood and Affect: Mood normal.         Behavior: Behavior normal.          Procedures       MDM     Presents due to abdominal pain with nausea and vomiting and constipation. Patient with longstanding history of repeat encounters for abdominal pain; she has not had a CT scan for this since January 2021. Recently diagnosed with diabetic gastroparesis. She appears distressed on arrival and uncomfortable due to symptoms; she is alert and oriented, abdomen has moderate TTP generalized which is worse in epigastric region; abdomen is soft without rebound, guarding, or rigidity. Lungs CTA bilaterally. Patient also has left-sided CVA tenderness. She was given droperidol, Pepcid, GI cocktail, and Bentyl with marked improvement of symptoms; repeat abdominal exams are also improved. Labs reviewed and significant for slight hypokalemia at 3.3 which was repleted, troponin initially 9, delta troponin is 1, patient without any chest pain or new EKG changes or any concerns for ACS. Disposition as below after CT results and multiple reassessments. I have discussed this patient with my attending, who has seen the patient and agrees with this disposition. Patient was seen and evaluated by myself and my attending Tony Bosch MD. Assessment and Plan discussed with attending provider, please see attestation for final plan of care. ED Course as of Chris 03 2230   Mon May 31, 2021   1419 EKG interpreted by the emergency department physician:    Rate is 87; rhythm is sinus; interpretation is sinus rhythm,  ms, QRS 68 ms,  ms, no ST elevations or abnormal T wave inversions; no significant changes when compared to previous.     [VG]   6521 Patient reassessed, she is resting and is in no acute distress; states her symptoms are improving; repeat abdominal exams are also improved. [VG]   8218 I received this patient at sign out from Dr. Archana Conrad. I have discussed the patient's initial exam, treatment and plan of care with the out going physician. I have introduced my self to the patient. I have examined the patient myself and reviewed ordered tests / medications and  reviewed any available results to this point. Plan: pending imaging; D/C home if normal        [BB]   191 Went to re-evaluate the patient. Laying in bed in no acute distress. Discussed reasons to return. Will follow up with her Gastroenterologist, Dr. Asa Martinez      [BB]      ED Course User Index  [BB] Fareed Edwards DO  [VG] Hema Weeks,        --------------------------------------------- PAST HISTORY ---------------------------------------------  Past Medical History:  has a past medical history of Bullous emphysema ([facility] [address] Gastroparesis, GERD (gastroesophageal reflux disease), Hypertension, Intractable abdominal pain, Pancreatic divisum, and Type 1 diabetes mellitus without complication ([facility] [address] Past Surgical History:  has a past surgical history that includes Hand surgery (Left, ?);  section; fracture surgery (Left, 5/10/2016); Upper gastrointestinal endoscopy (N/A, 2019); Upper gastrointestinal endoscopy (N/A, 2019); Upper gastrointestinal endoscopy (N/A, 2020); and Upper gastrointestinal endoscopy (N/A, 2020). Social History:  reports that she has been smoking cigarettes. She has a 4.75 pack-year smoking history. She has never used smokeless tobacco. She reports current drug use. Drug: Marijuana. She reports that she does not drink alcohol. Family History: family history includes High Blood Pressure in her mother; Kidney Disease in her mother; No Known Problems in an other family member. The patients home medications have been reviewed.     Allergies: Patient has no known allergies.     -------------------------------------------------- RESULTS -------------------------------------------------  Labs:  Results for orders placed or performed during the hospital encounter of 05/31/21   CBC Auto Differential   Result Value Ref Range    WBC 7.6 4.5 - 11.5 E9/L    RBC 4.56 3.50 - 5.50 E12/L    Hemoglobin 13.8 11.5 - 15.5 g/dL    Hematocrit 43.3 34.0 - 48.0 %    MCV 95.0 80.0 - 99.9 fL    MCH 30.3 26.0 - 35.0 pg    MCHC 31.9 (L) 32.0 - 34.5 %    RDW 14.6 11.5 - 15.0 fL    Platelets 365 228 - 716 E9/L    MPV 9.8 7.0 - 12.0 fL    Neutrophils % 70.2 43.0 - 80.0 %    Immature Granulocytes % 0.3 0.0 - 5.0 %    Lymphocytes % 21.4 20.0 - 42.0 %    Monocytes % 8.1 2.0 - 12.0 %    Eosinophils % 0.0 0.0 - 6.0 %    Basophils % 0.0 0.0 - 2.0 %    Neutrophils Absolute 5.32 1.80 - 7.30 E9/L    Immature Granulocytes # 0.02 E9/L    Lymphocytes Absolute 1.62 1.50 - 4.00 E9/L    Monocytes Absolute 0.61 0.10 - 0.95 E9/L    Eosinophils Absolute 0.00 (L) 0.05 - 0.50 E9/L    Basophils Absolute 0.00 0.00 - 0.20 E9/L   Lipase   Result Value Ref Range    Lipase 24 13 - 60 U/L   Urinalysis, reflex to microscopic   Result Value Ref Range    Color, UA Yellow Straw/Yellow    Clarity, UA Clear Clear    Glucose, Ur Negative Negative mg/dL    Bilirubin Urine Negative Negative    Ketones, Urine Negative Negative mg/dL    Specific Gravity, UA >=1.030 1.005 - 1.030    Blood, Urine LARGE (A) Negative    pH, UA 5.5 5.0 - 9.0    Protein, UA >=300 (A) Negative mg/dL    Urobilinogen, Urine 1.0 <2.0 E.U./dL    Nitrite, Urine Negative Negative    Leukocyte Esterase, Urine Negative Negative   Lactate, Sepsis   Result Value Ref Range    Lactic Acid, Sepsis 1.6 0.5 - 1.9 mmol/L   Lactate, Sepsis   Result Value Ref Range    Lactic Acid, Sepsis 0.7 0.5 - 1.9 mmol/L   PH, VENOUS   Result Value Ref Range    pH, Gaurav 7.34 (L) 7.35 - 7.45   Beta-Hydroxybutyrate   Result Value Ref Range    Beta-Hydroxybutyrate 0.86 (H) 0.02 - 0.27 mmol/L   Troponin   Result Value Ref Range    Troponin, High Sensitivity 9 0 - 9 ng/L   Microscopic Urinalysis   Result Value Ref Range    Hyaline Casts, UA 3-5 (A) 0 - 2 /LPF    WBC, UA 1-3 0 - 5 /HPF    RBC, UA 1-3 0 - 2 /HPF    Epithelial Cells, UA MANY /HPF    Bacteria, UA MODERATE (A) None Seen /HPF   Pregnancy, urine   Result Value Ref Range    HCG(Urine) Pregnancy Test NEGATIVE NEGATIVE   Troponin   Result Value Ref Range    Troponin, High Sensitivity 10 (H) 0 - 9 ng/L   SPECIMEN REJECTION   Result Value Ref Range    Rejected Test CMPX     Reason for Rejection see below    Comprehensive Metabolic Panel w/ Reflex to MG   Result Value Ref Range    Sodium 130 (L) 132 - 146 mmol/L    Potassium reflex Magnesium 3.3 (L) 3.5 - 5.0 mmol/L    Chloride 99 98 - 107 mmol/L    CO2 20 (L) 22 - 29 mmol/L    Anion Gap 11 7 - 16 mmol/L    Glucose 182 (H) 74 - 99 mg/dL    BUN 19 6 - 20 mg/dL    CREATININE 1.1 (H) 0.5 - 1.0 mg/dL    GFR Non-African American >60 >=60 mL/min/1.73    GFR African American >60     Calcium 8.8 8.6 - 10.2 mg/dL    Total Protein 8.2 6.4 - 8.3 g/dL    Albumin 3.7 3.5 - 5.2 g/dL    Total Bilirubin 0.4 0.0 - 1.2 mg/dL    Alkaline Phosphatase 57 35 - 104 U/L    ALT 9 0 - 32 U/L    AST 12 0 - 31 U/L   Magnesium   Result Value Ref Range    Magnesium 2.0 1.6 - 2.6 mg/dL   POCT Glucose   Result Value Ref Range    Meter Glucose 203 (H) 74 - 99 mg/dL    Glucose 203 mg/dL    QC OK? yes    EKG 12 Lead   Result Value Ref Range    Ventricular Rate 87 BPM    Atrial Rate 87 BPM    P-R Interval 110 ms    QRS Duration 68 ms    Q-T Interval 352 ms    QTc Calculation (Bazett) 423 ms    P Axis 53 degrees    R Axis 55 degrees    T Axis 42 degrees       Radiology:  CT ABDOMEN PELVIS W IV CONTRAST Additional Contrast? None   Final Result   Mildly dilated and thickened loops of distal small bowel in the low pelvis,   highly suspicious for focal enteritis. No evidence of obstructive uropathy.       1.9 cm right adnexal cyst.         XR CHEST PORTABLE   Final Result   No pneumonia or pleural effusion.               ------------------------- NURSING NOTES AND VITALS REVIEWED ---------------------------  Date / Time Roomed:  5/31/2021  1:08 PM  ED Bed Assignment:  17B/17B-17    The nursing notes within the ED encounter and vital signs as below have been reviewed. /84   Pulse 68   Temp 98.9 °F (37.2 °C) (Oral)   Resp 20   Ht 5' 8\" (1.727 m)   Wt 115 lb (52.2 kg)   LMP 05/03/2021   SpO2 100%   BMI 17.49 kg/m²   Oxygen Saturation Interpretation: Normal      ------------------------------------------ PROGRESS NOTES ------------------------------------------  3:19 PM EDT  I have spoken with the patient and discussed todays results, in addition to providing specific details for the plan of care and counseling regarding the diagnosis and prognosis. Their questions are answered at this time and they are agreeable with the plan. I discussed at length with them reasons for immediate return here for re evaluation. They will followup with their gastroenterologist and primary care physician by calling their office tomorrow. --------------------------------- ADDITIONAL PROVIDER NOTES ---------------------------------  At this time the patient is without objective evidence of an acute process requiring hospitalization or inpatient management. They have remained hemodynamically stable throughout their entire ED visit and are stable for discharge with outpatient follow-up. The plan has been discussed in detail and they are aware of the specific conditions for emergent return, as well as the importance of follow-up. Discharge Medication List as of 5/31/2021  6:32 PM      START taking these medications    Details   promethazine (PHENERGAN) 12.5 MG tablet Take 1 tablet by mouth every 6 hours as needed for Nausea, Disp-12 tablet, R-0Print             Diagnosis:  1. Generalized abdominal pain    2.  Non-intractable vomiting with nausea, unspecified vomiting type    3. Constipation, unspecified constipation type    4. Enteritis        Disposition:  Patient's disposition: Discharge to home  Patient's condition is stable.        Prasad Packer DO  Resident  06/03/21 3250 (4) no limitation

## 2021-06-17 NOTE — SWALLOW BEDSIDE ASSESSMENT ADULT - COMMENTS
FYI dod    dysphagia evaluation conducted bedside. pt received AAOX4. O2 via NC. pt reports no difficulty chewing, swallowing solids and liquids. no c/o pain. +toleration with no overt s/s of aspiration vs penetration

## 2021-09-22 NOTE — PATIENT PROFILE ADULT - NSTRANSFERDENTURES_GEN_A_NUR
PATIENT INSTRUCTIONS    Treatment:  PT Location  Physical Therapy     [facility]   [address] [facility]  [address] [address] [place]  [id number] [address] [facility]  [address]  [address] [address] [facility]  [address] [address] [place]  [address]          [phone]            · Occupational Therapy (Hand/Upper extremity therapy) is offered at the [facility]  · Please dress according to treatment area (ex: knee treatment needs to wear shorts)  · Co-payments are due at the time of appointment  · To ensure your visit goes as smooth as possible, please check your insurance benefits for coverage of physical therapy and bring a copy of your insurance card.        Follow-Up:  Please make an appointment with  CARMELITA Ceron in 3 weeks                 Time left: 9/22/2021 3:46 PM         Please note: 24 hour notice for cancellation of appointment is required.    You may receive a survey in the mail, or via the e-mail address that you have provided.  We would appreciate if you could fill out the survey and provide us with any feedback on your experience regarding your visit today. Thank you for allowing us to provide you with your health care needs.     Do not hesitate to call if you are experiencing severe pain, worsening or change in your pain, have symptoms of infection (fever, warmth, redness, increased drainage), or have any other problem that concerns you ~ [phone] (or [phone] after hours).    Please remember when requesting refills on pain medication that the request should be made by Thursday at the latest. [facility] is open Monday-Friday, 8am-5pm, and closed on the weekends.  No narcotic refills will be filled after hours.    Additional Educational Resources:  For additional resources regarding your symptoms, diagnosis, or further health information,  please visit the Health Resources section on [email or web address] or the Online Health Resources section in [email or web address].         full/upper/lower

## 2021-10-06 PROBLEM — I10 ESSENTIAL HYPERTENSION: Status: ACTIVE | Noted: 2019-03-01

## 2022-10-03 NOTE — PROGRESS NOTE ADULT - ASSESSMENT
Membrane is not visually significant. Poor view of right eye. Patient is a 80 y.o female w/ hx of AFIB on dig/ Cardizem /warfarin, HTN, HLD, CAD, COPD, recently started on 2L supplemental  oxygen presents to the ED for evaluation of dyspnea and productive cough.     Hypoxemia secondary to chronic COPD  - Presented with worsening SOB and productive cough, states she is compliant with medication  - Never been Intubated, former smoker 65 pack year hx, last COPD exacerbation was Sept 2019  - Desaturated  to 91% on RA from 97% and was showing signs of distress/tachypnea, symptoms improved on BIPAP  - VBG with respiratory alkalosis, compensated   - Pulm recs appreciated:  c/w Solumedrol, Inhalers, Duoneb  and Bipap 4hrs on// 4hrs off   - C/w Doxycycline to complete 5   - CXR (7/25) improved from previous    AFIB:  Presenting with Sinus Bradycardia and Subtherapeutic INR   - Takes Digoxin and Cardizem for rate control (Dig Level is therapeutic)  - Continue AC with coumadin, takes 5mg daily   - Hold digoxin given hyperkalemia and sinus bradycardia, c/w Cardizem 180 qd (home dose 240) for rate control, hold if laura.     Essential HTN  - C/w Enalapril and Nifedipine XL daily    CAD + HLD  - ECHO 2019: EF: 50-55%, Mild- mod mitral valve regurgitation  - Stress test from 2019: unremarkable  - C/w ASA and Statin     # Diet: DASH/TLC  # DVT Prophylaxis: warfarin  # GI Prophylaxis: protonix  # Activity: IAT  # Dispo: from home  # Code Status: Full code Patient is a 80 y.o female w/ hx of AFIB on dig/ Cardizem /warfarin, HTN, HLD, CAD, COPD, recently started on 2L supplemental  oxygen presents to the ED for evaluation of dyspnea and productive cough.     Hypoxemia secondary to chronic COPD  - Presented with worsening SOB and productive cough, states she is compliant with medication  - Never been Intubated, former smoker 65 pack year hx, last COPD exacerbation was Sept 2019  - Desaturated  to 91% on RA from 97% and was showing signs of distress/tachypnea, symptoms improved on BIPAP  - VBG with respiratory alkalosis, compensated   - Pulm recs appreciated:  c/w Solumedrol, Inhalers, Duoneb  and Bipap 4hrs on// 4hrs off   - C/w Doxycycline to complete 5   - CXR (7/25) improved from previous    AFIB:  Presenting with Sinus Bradycardia and Subtherapeutic INR   - Takes Digoxin and Cardizem for rate control (Dig Level is therapeutic)  - Continue AC with coumadin, takes 5mg daily   - Hold digoxin given hyperkalemia and sinus bradycardia, c/w Cardizem 180 qd (home dose 240) for rate control, hold if laura.     Essential HTN  - C/w Enalapril and Nifedipine XL daily    CAD + HLD  - F/u Echo  - ECHO 2019: EF: 50-55%, Mild- mod mitral valve regurgitation  - Stress test from 2019: unremarkable  - C/w ASA and Statin     # Diet: DASH/TLC  # DVT Prophylaxis: warfarin  # GI Prophylaxis: protonix  # Activity: IAT  # Dispo: from home  # Code Status: Full code

## 2023-05-23 NOTE — ASU PREOP CHECKLIST - RESPIRATORY RATE (BREATHS/MIN)
18 Cartilage Graft Text: The defect edges were debeveled with a #15 scalpel blade.  Given the location of the defect, shape of the defect, the fact the defect involved a full thickness cartilage defect a cartilage graft was deemed most appropriate.  An appropriate donor site was identified, cleansed, and anesthetized. The cartilage graft was then harvested and transferred to the recipient site, oriented appropriately and then sutured into place.  The secondary defect was then repaired using a primary closure.

## 2024-04-01 NOTE — H&P ADULT - DOES THIS PATIENT HAVE A HISTORY OF OR HAS BEEN DX WITH HEART FAILURE?
[employer] message not read by patient.  Letter mailed to patient with the information from the [employer] message.    Lesley Romero [facility]  Clinical Pharmacy   Department, toll free: [phone] Option #3      For Pharmacy Admin Tracking Only    Program: [email or web address]  CPA in place:  No  Gap Closed?: Yes   Time Spent (min): 5     unknown

## 2024-05-28 NOTE — ASU PREOP CHECKLIST - PATIENT SENT TO
Patient's chart and therapy participation noted.    Patient is at modified independent to supervision assist for mobility and ADLs.    Patient is not a candidate for acute inpatient rehabilitation due to high function and premorbid functional status can be achieved with few more therapy sessions while medical status is being optimized.    Patient's family is comfortable providing the supervision and periodic assist which patient requires at this time.    Initiate further home care therapies as recommended by the therapist.    A formal consult is deferred.    Call with questions.   endoscopy
